# Patient Record
Sex: FEMALE | Race: WHITE | NOT HISPANIC OR LATINO | Employment: OTHER | URBAN - METROPOLITAN AREA
[De-identification: names, ages, dates, MRNs, and addresses within clinical notes are randomized per-mention and may not be internally consistent; named-entity substitution may affect disease eponyms.]

---

## 2018-12-06 ENCOUNTER — TRANSFERRED RECORDS (OUTPATIENT)
Dept: HEALTH INFORMATION MANAGEMENT | Facility: CLINIC | Age: 58
End: 2018-12-06

## 2019-01-01 ENCOUNTER — TRANSFERRED RECORDS (OUTPATIENT)
Dept: MULTI SPECIALTY CLINIC | Facility: CLINIC | Age: 59
End: 2019-01-01

## 2020-01-01 ENCOUNTER — TRANSFERRED RECORDS (OUTPATIENT)
Dept: MULTI SPECIALTY CLINIC | Facility: CLINIC | Age: 60
End: 2020-01-01

## 2020-01-01 LAB — PAP SMEAR - HIM PATIENT REPORTED: NEGATIVE

## 2020-01-16 ENCOUNTER — TRANSFERRED RECORDS (OUTPATIENT)
Dept: HEALTH INFORMATION MANAGEMENT | Facility: CLINIC | Age: 60
End: 2020-01-16

## 2020-01-21 ENCOUNTER — RECORDS - HEALTHEAST (OUTPATIENT)
Dept: ADMINISTRATIVE | Facility: OTHER | Age: 60
End: 2020-01-21

## 2020-02-08 ENCOUNTER — HEALTH MAINTENANCE LETTER (OUTPATIENT)
Age: 60
End: 2020-02-08

## 2020-11-07 ENCOUNTER — HEALTH MAINTENANCE LETTER (OUTPATIENT)
Age: 60
End: 2020-11-07

## 2021-03-21 ENCOUNTER — HEALTH MAINTENANCE LETTER (OUTPATIENT)
Age: 61
End: 2021-03-21

## 2021-03-29 ENCOUNTER — OFFICE VISIT (OUTPATIENT)
Dept: PEDIATRICS | Facility: CLINIC | Age: 61
End: 2021-03-29
Payer: COMMERCIAL

## 2021-03-29 VITALS
TEMPERATURE: 97.9 F | HEART RATE: 83 BPM | SYSTOLIC BLOOD PRESSURE: 110 MMHG | WEIGHT: 140.1 LBS | HEIGHT: 66 IN | BODY MASS INDEX: 22.52 KG/M2 | DIASTOLIC BLOOD PRESSURE: 70 MMHG | OXYGEN SATURATION: 97 %

## 2021-03-29 DIAGNOSIS — N93.9 ABNORMAL UTERINE BLEEDING DUE TO ENDOMETRIAL POLYP: ICD-10-CM

## 2021-03-29 DIAGNOSIS — Z01.818 PREOP GENERAL PHYSICAL EXAM: Primary | ICD-10-CM

## 2021-03-29 DIAGNOSIS — N84.0 ABNORMAL UTERINE BLEEDING DUE TO ENDOMETRIAL POLYP: ICD-10-CM

## 2021-03-29 LAB
ALBUMIN SERPL-MCNC: 3.8 G/DL (ref 3.4–5)
ALP SERPL-CCNC: 53 U/L (ref 40–150)
ALT SERPL W P-5'-P-CCNC: 24 U/L (ref 0–50)
ANION GAP SERPL CALCULATED.3IONS-SCNC: 7 MMOL/L (ref 3–14)
AST SERPL W P-5'-P-CCNC: 16 U/L (ref 0–45)
BILIRUB SERPL-MCNC: 0.6 MG/DL (ref 0.2–1.3)
BUN SERPL-MCNC: 15 MG/DL (ref 7–30)
CALCIUM SERPL-MCNC: 9.2 MG/DL (ref 8.5–10.1)
CHLORIDE SERPL-SCNC: 105 MMOL/L (ref 94–109)
CO2 SERPL-SCNC: 27 MMOL/L (ref 20–32)
CREAT SERPL-MCNC: 0.76 MG/DL (ref 0.52–1.04)
ERYTHROCYTE [DISTWIDTH] IN BLOOD BY AUTOMATED COUNT: 12.2 % (ref 10–15)
GFR SERPL CREATININE-BSD FRML MDRD: 84 ML/MIN/{1.73_M2}
GLUCOSE SERPL-MCNC: 76 MG/DL (ref 70–99)
HCT VFR BLD AUTO: 41.9 % (ref 35–47)
HGB BLD-MCNC: 14.1 G/DL (ref 11.7–15.7)
MCH RBC QN AUTO: 32.7 PG (ref 26.5–33)
MCHC RBC AUTO-ENTMCNC: 33.7 G/DL (ref 31.5–36.5)
MCV RBC AUTO: 97 FL (ref 78–100)
PLATELET # BLD AUTO: 266 10E9/L (ref 150–450)
POTASSIUM SERPL-SCNC: 4 MMOL/L (ref 3.4–5.3)
PROT SERPL-MCNC: 7.4 G/DL (ref 6.8–8.8)
RBC # BLD AUTO: 4.31 10E12/L (ref 3.8–5.2)
SODIUM SERPL-SCNC: 139 MMOL/L (ref 133–144)
WBC # BLD AUTO: 5.3 10E9/L (ref 4–11)

## 2021-03-29 PROCEDURE — 85027 COMPLETE CBC AUTOMATED: CPT | Performed by: NURSE PRACTITIONER

## 2021-03-29 PROCEDURE — 99203 OFFICE O/P NEW LOW 30 MIN: CPT | Performed by: NURSE PRACTITIONER

## 2021-03-29 PROCEDURE — 80053 COMPREHEN METABOLIC PANEL: CPT | Performed by: NURSE PRACTITIONER

## 2021-03-29 PROCEDURE — 36415 COLL VENOUS BLD VENIPUNCTURE: CPT | Performed by: NURSE PRACTITIONER

## 2021-03-29 SDOH — HEALTH STABILITY: MENTAL HEALTH: HOW OFTEN DO YOU HAVE 6 OR MORE DRINKS ON ONE OCCASION?: NOT ASKED

## 2021-03-29 SDOH — HEALTH STABILITY: MENTAL HEALTH: HOW MANY STANDARD DRINKS CONTAINING ALCOHOL DO YOU HAVE ON A TYPICAL DAY?: NOT ASKED

## 2021-03-29 SDOH — HEALTH STABILITY: MENTAL HEALTH: HOW OFTEN DO YOU HAVE A DRINK CONTAINING ALCOHOL?: NOT ASKED

## 2021-03-29 ASSESSMENT — MIFFLIN-ST. JEOR: SCORE: 1212.24

## 2021-03-29 NOTE — PATIENT INSTRUCTIONS

## 2021-03-29 NOTE — PROGRESS NOTES
[facility]  [address]  [address]  [place] [address]  Phone: [phone]  Fax: [phone]  Primary Provider: Physicians, [facility]        PREOPERATIVE EVALUATION:  Today's date: 3/29/2021    Julia Key is a 60 year old female who presents for a preoperative evaluation.    Surgical Information:  Surgery/Procedure: Hysteroscopy, polypectomy and d&c  Surgery Location: [facility] [place], [place]  Surgeon: Dr. Sauer  Surgery Date: April 7, 2021  Time of Surgery: 2:00 pm  Where patient plans to recover: At home with family  Fax number for surgical facility: Will locate    Type of Anesthesia Anticipated: Local, possibly other    Assessment & Plan     The proposed surgical procedure is considered LOW risk.    Preop general physical exam  Abnormal uterine bleeding due to endometrial polyp  - CBC with platelets  - Comprehensive metabolic panel (BMP + Alb, Alk Phos, ALT, AST, Total. Bili, TP)       Risks and Recommendations:  The patient has the following additional risks and recommendations for perioperative complications:    Cardiovascular:   - History of hyperlipidemia discovered on lipid panel 8/16/2016. Family history significant for hypertension and heart disease in father, CAD in brother, and MI in paternal grandmother. Not on statin therapy. No calcium score on file.     Medication Instructions:  Patient plans to hold her daily medications the morning of procedure.     RECOMMENDATION:  APPROVAL GIVEN to proceed with proposed procedure, without further diagnostic evaluation.      34 minutes spent on the date of the encounter doing chart review, review of test results, interpretation of tests, patient visit and documentation           Subjective     HPI related to upcoming procedure: Hysteroscopy, Polypectomy and D&C. The following was obtained from patient report: Julia reports she had been on bioidentical hormone replacement for a little over a year, at routine  check up it was recommended this be discontinued. Last injection May 2020. Had some spotting thereafter, worse with increased physical actvity. In February 2021, had increased and prolonged uterine bleeding with clots. Transvaginal US and biopsy completed, biopsy results benign. Found polyp, thought to be causative for bleeding, therefore was recommended to have removed.     Preop Questions 3/26/2021   1. Have you ever had a heart attack or stroke? No   2. Have you ever had surgery on your heart or blood vessels, such as a stent placement, a coronary artery bypass, or surgery on an artery in your head, neck, heart, or legs? No   3. Do you have chest pain with activity? No   4. Do you have a history of  heart failure? No   5. Do you currently have a cold, bronchitis or symptoms of other infection? No   6. Do you have a cough, shortness of breath, or wheezing? No   7. Do you or anyone in your family have previous history of blood clots? No   8. Do you or does anyone in your family have a serious bleeding problem such as prolonged bleeding following surgeries or cuts? No   9. Have you ever had problems with anemia or been told to take iron pills? YES - Remote hx of anemia, not currently taking Iron supp.   10. Have you had any abnormal blood loss such as black, tarry or bloody stools, or abnormal vaginal bleeding? YES - Related to surgery need   11. Have you ever had a blood transfusion? No   12. Are you willing to have a blood transfusion if it is medically needed before, during, or after your surgery? Yes   13. Have you or any of your relatives ever had problems with anesthesia? No   14. Do you have sleep apnea, excessive snoring or daytime drowsiness? No   15. Do you have any artifical heart valves or other implanted medical devices like a pacemaker, defibrillator, or continuous glucose monitor? No   16. Do you have artificial joints? No   17. Are you allergic to latex? YES: Current allergy, hives and oral swelling.        Health Care Directive:  Patient does not have a Health Care Directive or Living Will: Discussed advance care planning with patient; however, patient declined at this time.    Preoperative Review of :   reviewed - no record of controlled substances prescribed.      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     Hyperlipidemia     One high reading late Fall 2016     Osteopenia      Past Surgical History:   Procedure Laterality Date     C LT SHOULDER G/E 2 VIEWS  01/01/1999 1997, 1999 - decompression, clavicle resection       Family History   Problem Relation Age of Onset     Osteoporosis Mother      Dementia Mother      Hypertension Father      Heart Disease Father      Cerebrovascular Disease Father      Coronary Artery Disease Father 56     Coronary Artery Disease Brother      No Known Problems Maternal Grandmother      No Known Problems Maternal Grandfather      Myocardial Infarction Paternal Grandmother      Obesity Paternal Grandmother      Social History     Socioeconomic History     Marital status:      Spouse name: Not on file     Number of children: Not on file     Years of education: Not on file     Highest education level: Not on file   Occupational History     Not on file   Social Needs     Financial resource strain: Not on file     Food insecurity     Worry: Not on file     Inability: Not on file     Transportation needs     Medical: Not on file     Non-medical: Not on file   Tobacco Use     Smoking status: Never Smoker     Smokeless tobacco: Never Used   Substance and Sexual Activity     Alcohol use: Yes     Comment: 2-3 drinks/month, socially     Drug use: No     Sexual activity: Yes     Partners: Male     Birth control/protection: None   Lifestyle     Physical activity     Days per week: Not on file     Minutes per session: Not on file     Stress: Not on file   Relationships     Social connections     Talks on phone:  "Not on file     Gets together: Not on file     Attends Yazidism service: Not on file     Active member of club or organization: Not on file     Attends meetings of clubs or organizations: Not on file     Relationship status: Not on file     Intimate partner violence     Fear of current or ex partner: Not on file     Emotionally abused: Not on file     Physically abused: Not on file     Forced sexual activity: Not on file   Other Topics Concern     Parent/sibling w/ CABG, MI or angioplasty before 65F 55M? 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   since 1989. One daughter, one dog.     Current Outpatient Medications   Medication     CALCIUM-VITAMIN D PO     cetirizine (ZYRTEC) 10 MG tablet     No current facility-administered medications for this visit.         Allergies   Allergen Reactions     Iodine Hives     Latex          Review of Systems  CONSTITUTIONAL: NEGATIVE for fever, chills, change in weight  INTEGUMENTARY/SKIN: NEGATIVE for worrisome rashes, moles or lesions  EYES: NEGATIVE for vision changes or irritation  ENT/MOUTH: NEGATIVE for ear, mouth and throat problems  RESP: NEGATIVE for significant cough or SOB  BREAST: NEGATIVE for masses, tenderness or discharge  CV: NEGATIVE for chest pain, palpitations or peripheral edema  GI: NEGATIVE for nausea, abdominal pain, heartburn. Some mild in bowel habits, looser stools.  : NEGATIVE for frequency, dysuria, or hematuria  MUSCULOSKELETAL: NEGATIVE for significant arthralgias or myalgia  NEURO: NEGATIVE for weakness, dizziness or paresthesias  ENDOCRINE: NEGATIVE for temperature intolerance, skin/hair changes  HEME: NEGATIVE for bleeding problems  PSYCHIATRIC: NEGATIVE for changes in mood or affect          Objective     /70 (BP Location: Right arm, Patient Position: Sitting, Cuff Size: Adult Regular)   Pulse 83   Temp 97.9  F (36.6  C) (Tympanic)   Ht 1.676 m (5' 6\")   Wt 63.5 kg " (140 lb 1.6 oz)   SpO2 97%   BMI 22.61 kg/m      Physical Exam  Constitutional: appears to be in no acute distress, comfortable, pleasant.   Eyes: anicteric, conjunctiva clear without drainage or erythema. MARQUEZ.   Ears, Nose and Throat: tympanic membranes gray with LR,  nose without nasal discharge. OP: no erythema to posterior pharynx, negative post nasal drainage, tonsils +1 no erythema or exudate.  Neck: supple, thyroid palpable,not enlarged, no nodules   Cardiovascular: regular rate and rhythm, normal S1 and S2, no murmurs, rubs or gallops, peripheral pulses full and symmetric; negative peripheral edema   Respiratory: Air entry throughout. Breathing pattern unlabored without the use of accessory muscles. Clear to auscultation A and P, no wheezes or crackles, normal breath sounds.    Gastrointestinal: rounded, soft. Positive bowel sounds x4, nontender, no masses.   Musculoskeletal: full range of motion, no edema.   Skin: pink, turgor smooth and elastic. Negative for lesions or dryness.  Neurological: normal gait, no tremor.   Psychological: appropriate mood and affect.   Lymphatic: no cervical, axillary, supraclavicular, or infraclavicular lymphadenopathy.    No results for input(s): HGB, PLT, INR, NA, POTASSIUM, CR, A1C in the last 42685 hours.     Diagnostics:  Recent Results (from the past 24 hour(s))   CBC with platelets    Collection Time: 03/29/21  9:30 AM   Result Value Ref Range    WBC 5.3 4.0 - 11.0 10e9/L    RBC Count 4.31 3.8 - 5.2 10e12/L    Hemoglobin 14.1 11.7 - 15.7 g/dL    Hematocrit 41.9 35.0 - 47.0 %    MCV 97 78 - 100 fl    MCH 32.7 26.5 - 33.0 pg    MCHC 33.7 31.5 - 36.5 g/dL    RDW 12.2 10.0 - 15.0 %    Platelet Count 266 150 - 450 10e9/L   Comprehensive metabolic panel (BMP + Alb, Alk Phos, ALT, AST, Total. Bili, TP)    Collection Time: 03/29/21  9:30 AM   Result Value Ref Range    Sodium 139 133 - 144 mmol/L    Potassium 4.0 3.4 - 5.3 mmol/L    Chloride 105 94 - 109 mmol/L    Carbon  Dioxide 27 20 - 32 mmol/L    Anion Gap 7 3 - 14 mmol/L    Glucose 76 70 - 99 mg/dL    Urea Nitrogen 15 7 - 30 mg/dL    Creatinine 0.76 0.52 - 1.04 mg/dL    GFR Estimate 84 >60 mL/min/[1.73_m2]    GFR Estimate If Black >90 >60 mL/min/[1.73_m2]    Calcium 9.2 8.5 - 10.1 mg/dL    Bilirubin Total 0.6 0.2 - 1.3 mg/dL    Albumin 3.8 3.4 - 5.0 g/dL    Protein Total 7.4 6.8 - 8.8 g/dL    Alkaline Phosphatase 53 40 - 150 U/L    ALT 24 0 - 50 U/L    AST 16 0 - 45 U/L      No EKG required, no history of coronary heart disease, significant arrhythmia, peripheral arterial disease or other structural heart disease.    Revised Cardiac Risk Index (RCRI):  The patient has the following serious cardiovascular risks for perioperative complications:   - No serious cardiac risks = 0 points     RCRI Interpretation: 0 points: Class I (very low risk - 0.4% complication rate)         Signed Electronically by: DARLENE Acevedo CNP  Copy of this evaluation report is provided to requesting physician.

## 2021-09-05 ENCOUNTER — HEALTH MAINTENANCE LETTER (OUTPATIENT)
Age: 61
End: 2021-09-05

## 2022-02-20 ENCOUNTER — HEALTH MAINTENANCE LETTER (OUTPATIENT)
Age: 62
End: 2022-02-20

## 2022-04-17 ENCOUNTER — HEALTH MAINTENANCE LETTER (OUTPATIENT)
Age: 62
End: 2022-04-17

## 2022-07-18 ENCOUNTER — TRANSFERRED RECORDS (OUTPATIENT)
Dept: MULTI SPECIALTY CLINIC | Facility: CLINIC | Age: 62
End: 2022-07-18

## 2022-07-20 SDOH — ECONOMIC STABILITY: TRANSPORTATION INSECURITY
IN THE PAST 12 MONTHS, HAS THE LACK OF TRANSPORTATION KEPT YOU FROM MEDICAL APPOINTMENTS OR FROM GETTING MEDICATIONS?: NO

## 2022-07-20 SDOH — ECONOMIC STABILITY: FOOD INSECURITY: WITHIN THE PAST 12 MONTHS, THE FOOD YOU BOUGHT JUST DIDN'T LAST AND YOU DIDN'T HAVE MONEY TO GET MORE.: NEVER TRUE

## 2022-07-20 SDOH — ECONOMIC STABILITY: TRANSPORTATION INSECURITY
IN THE PAST 12 MONTHS, HAS LACK OF TRANSPORTATION KEPT YOU FROM MEETINGS, WORK, OR FROM GETTING THINGS NEEDED FOR DAILY LIVING?: NO

## 2022-07-20 SDOH — HEALTH STABILITY: PHYSICAL HEALTH: ON AVERAGE, HOW MANY DAYS PER WEEK DO YOU ENGAGE IN MODERATE TO STRENUOUS EXERCISE (LIKE A BRISK WALK)?: 3 DAYS

## 2022-07-20 SDOH — HEALTH STABILITY: PHYSICAL HEALTH: ON AVERAGE, HOW MANY MINUTES DO YOU ENGAGE IN EXERCISE AT THIS LEVEL?: 30 MIN

## 2022-07-20 SDOH — ECONOMIC STABILITY: INCOME INSECURITY: IN THE LAST 12 MONTHS, WAS THERE A TIME WHEN YOU WERE NOT ABLE TO PAY THE MORTGAGE OR RENT ON TIME?: NO

## 2022-07-20 SDOH — ECONOMIC STABILITY: FOOD INSECURITY: WITHIN THE PAST 12 MONTHS, YOU WORRIED THAT YOUR FOOD WOULD RUN OUT BEFORE YOU GOT MONEY TO BUY MORE.: NEVER TRUE

## 2022-07-20 SDOH — ECONOMIC STABILITY: INCOME INSECURITY: HOW HARD IS IT FOR YOU TO PAY FOR THE VERY BASICS LIKE FOOD, HOUSING, MEDICAL CARE, AND HEATING?: NOT HARD AT ALL

## 2022-07-20 ASSESSMENT — ENCOUNTER SYMPTOMS
DIARRHEA: 0
JOINT SWELLING: 0
FREQUENCY: 0
SHORTNESS OF BREATH: 1
PARESTHESIAS: 0
BREAST MASS: 0
EYE PAIN: 0
DIZZINESS: 0
DYSURIA: 0
CONSTIPATION: 0
NAUSEA: 1
NERVOUS/ANXIOUS: 0
WEAKNESS: 0
ABDOMINAL PAIN: 0
HEMATURIA: 0
HEARTBURN: 0
PALPITATIONS: 0
MYALGIAS: 0
HEADACHES: 0
FEVER: 0
ARTHRALGIAS: 1
SORE THROAT: 0
COUGH: 0
HEMATOCHEZIA: 0
CHILLS: 0

## 2022-07-20 ASSESSMENT — SOCIAL DETERMINANTS OF HEALTH (SDOH)
DO YOU BELONG TO ANY CLUBS OR ORGANIZATIONS SUCH AS CHURCH GROUPS UNIONS, FRATERNAL OR ATHLETIC GROUPS, OR SCHOOL GROUPS?: NO
HOW OFTEN DO YOU GET TOGETHER WITH FRIENDS OR RELATIVES?: ONCE A WEEK
HOW OFTEN DO YOU ATTEND CHURCH OR RELIGIOUS SERVICES?: 1 TO 4 TIMES PER YEAR
IN A TYPICAL WEEK, HOW MANY TIMES DO YOU TALK ON THE PHONE WITH FAMILY, FRIENDS, OR NEIGHBORS?: MORE THAN THREE TIMES A WEEK

## 2022-07-20 ASSESSMENT — LIFESTYLE VARIABLES
HOW OFTEN DO YOU HAVE A DRINK CONTAINING ALCOHOL: MONTHLY OR LESS
HOW OFTEN DO YOU HAVE SIX OR MORE DRINKS ON ONE OCCASION: NEVER
AUDIT-C TOTAL SCORE: 1
HOW MANY STANDARD DRINKS CONTAINING ALCOHOL DO YOU HAVE ON A TYPICAL DAY: 1 OR 2
SKIP TO QUESTIONS 9-10: 1

## 2022-07-27 ENCOUNTER — OFFICE VISIT (OUTPATIENT)
Dept: PEDIATRICS | Facility: CLINIC | Age: 62
End: 2022-07-27
Payer: COMMERCIAL

## 2022-07-27 VITALS
SYSTOLIC BLOOD PRESSURE: 122 MMHG | HEIGHT: 65 IN | HEART RATE: 85 BPM | WEIGHT: 141 LBS | BODY MASS INDEX: 23.49 KG/M2 | TEMPERATURE: 97.5 F | OXYGEN SATURATION: 99 % | RESPIRATION RATE: 16 BRPM | DIASTOLIC BLOOD PRESSURE: 80 MMHG
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
## 2022-07-27 DIAGNOSIS — M85.80 OSTEOPENIA, UNSPECIFIED LOCATION: ICD-10-CM

## 2022-07-27 DIAGNOSIS — Z13.220 SCREENING FOR HYPERLIPIDEMIA: ICD-10-CM

## 2022-07-27 DIAGNOSIS — R07.89 CHEST TIGHTNESS: ICD-10-CM

## 2022-07-27 DIAGNOSIS — Z00.00 ROUTINE GENERAL MEDICAL EXAMINATION AT A HEALTH CARE FACILITY: Primary | ICD-10-CM

## 2022-07-27 DIAGNOSIS — N95.1 SYMPTOMATIC MENOPAUSAL OR FEMALE CLIMACTERIC STATES: ICD-10-CM

## 2022-07-27 PROCEDURE — 99213 OFFICE O/P EST LOW 20 MIN: CPT | Mod: 25 | Performed by: NURSE PRACTITIONER

## 2022-07-27 PROCEDURE — 99396 PREV VISIT EST AGE 40-64: CPT | Performed by: NURSE PRACTITIONER

## 2022-07-27 PROCEDURE — 82947 ASSAY GLUCOSE BLOOD QUANT: CPT | Performed by: NURSE PRACTITIONER

## 2022-07-27 PROCEDURE — 80061 LIPID PANEL: CPT | Performed by: NURSE PRACTITIONER

## 2022-07-27 PROCEDURE — 36415 COLL VENOUS BLD VENIPUNCTURE: CPT | Performed by: NURSE PRACTITIONER

## 2022-07-27 RX ORDER — TRIAMCINOLONE ACETONIDE 1 MG/G
CREAM TOPICAL 2 TIMES DAILY
Qty: 45 G | Refills: 0 | Status: SHIPPED | OUTPATIENT
Start: 2022-07-27 | End: 2024-07-29

## 2022-07-27 ASSESSMENT — ENCOUNTER SYMPTOMS
HEADACHES: 0
HEARTBURN: 0
SHORTNESS OF BREATH: 1
EYE PAIN: 0
CHILLS: 0
FEVER: 0
DIARRHEA: 0
CONSTIPATION: 0
BREAST MASS: 0
WEAKNESS: 0
PARESTHESIAS: 0
ARTHRALGIAS: 1
HEMATURIA: 0
DIZZINESS: 0
NERVOUS/ANXIOUS: 0
HEMATOCHEZIA: 0
ABDOMINAL PAIN: 0
DYSURIA: 0
FREQUENCY: 0
NAUSEA: 1
COUGH: 0
MYALGIAS: 0
JOINT SWELLING: 0
SORE THROAT: 0
PALPITATIONS: 0

## 2022-07-27 ASSESSMENT — PAIN SCALES - GENERAL: PAINLEVEL: NO PAIN (0)

## 2022-07-27 NOTE — PATIENT INSTRUCTIONS
[facility]  [phone]    Preventive Health Recommendations  Female Ages 50 - 64    Yearly exam: See your health care provider every year in order to  Review health changes.   Discuss preventive care.    Review your medicines if your doctor has prescribed any.    Get a Pap test every three years (unless you have an abnormal result and your provider advises testing more often).  If you get Pap tests with HPV test, you only need to test every 5 years, unless you have an abnormal result.   You do not need a Pap test if your uterus was removed (hysterectomy) and you have not had cancer.  You should be tested each year for STDs (sexually transmitted diseases) if you're at risk.   Have a mammogram every 1 to 2 years.  Have a colonoscopy at age 50, or have a yearly FIT test (stool test). These exams screen for colon cancer.    Have a cholesterol test every 5 years, or more often if advised.  Have a diabetes test (fasting glucose) every three years. If you are at risk for diabetes, you should have this test more often.   If you are at risk for osteoporosis (brittle bone disease), think about having a bone density scan (DEXA).    Shots: Get a flu shot each year. Get a tetanus shot every 10 years.    Nutrition:   Eat at least 5 servings of fruits and vegetables each day.  Eat whole-grain bread, whole-wheat pasta and brown rice instead of white grains and rice.  Get adequate Calcium and Vitamin D.     Lifestyle  Exercise at least 150 minutes a week (30 minutes a day, 5 days a week). This will help you control your weight and prevent disease.  Limit alcohol to one drink per day.  No smoking.   Wear sunscreen to prevent skin cancer.   See your dentist every six months for an exam and cleaning.  See your eye doctor every 1 to 2 years.

## 2022-07-27 NOTE — PROGRESS NOTES
"   SUBJECTIVE:   CC: Julia Key is an 61 year old woman who presents for preventive health visit.     Patient has been advised of split billing requirements and indicates understanding: Yes     Healthy Habits:     Getting at least 3 servings of Calcium per day:  Yes    Bi-annual eye exam:  Yes    Dental care twice a year:  Yes    Sleep apnea or symptoms of sleep apnea:  None    Diet:  Gluten-free/reduced    Frequency of exercise:  4-5 days/week    Duration of exercise:  45-60 minutes    Taking medications regularly:  No    Barriers to taking medications:  Other    Medication side effects:  Other    PHQ-2 Total Score: 0    Additional concerns today:  Yes    Last pap January 2020 at [facility], results reportedly normal. Denies history of abnormal paps.   Mammo - completed last week through [facility], reportedly normal.   DEXA - due  Colonoscopy completed 2019 at [facility] - normal, repeat in 10 years    Has noted that when she climbs stairs, specifically two flights in close succession, will feel substernal chest tightness and possibly some mild SOB. Resolves with rest after a couple of minutes. Can walk at brisk pace and do some aerobics without symptoms.     Hot flashes are \"unbelievable.\" Occur first thing in the morning, intermittent from 8 am until midnight. Was on HRT for period of time as well as bio-identical hormone, which was very helpful. Hot flashes worse since removal of endometrial polyp. Also has vaginal dryness.     Today's PHQ-2 Score:   PHQ-2 ( 1999 Pf[employer]) 7/20/2022   Q1: Little interest or pleasure in doing things 0   Q2: Feeling down, depressed or hopeless 0   PHQ-2 Score 0   Q1: Little interest or pleasure in doing things Not at all   Q2: Feeling down, depressed or hopeless Not at all   PHQ-2 Score 0       Abuse: Current or Past (Physical, Sexual or Emotional) - No  Do you feel safe in your environment? Yes    Have you ever done Advance Care Planning? (For example, a " Health Directive, POLST, or a discussion with a medical provider or your loved ones about your wishes): No, advance care planning information given to patient to review.  Patient plans to discuss their wishes with loved ones or provider.      Social History     Tobacco Use     Smoking status: Never Smoker     Smokeless tobacco: Never Used   Substance Use Topics     Alcohol use: Yes     Comment: 2-3 drinks/month, socially         Alcohol Use 7/20/2022   Prescreen: >3 drinks/day or >7 drinks/week? No       Reviewed orders with patient.  Reviewed health maintenance and updated orders accordingly - Yes  BP Readings from Last 3 Encounters:   07/27/22 122/80   03/29/21 110/70   12/16/16 145/90    Wt Readings from Last 3 Encounters:   07/27/22 64 kg (141 lb)   03/29/21 63.5 kg (140 lb 1.6 oz)   12/16/16 68.5 kg (151 lb 1.6 oz)             Breast Cancer Screening:    Breast CA Risk Assessment (FHS-7) 7/20/2022   Do you have a family history of breast, colon, or ovarian cancer? No / Unknown       Mammogram Screening: Recommended mammography every 1-2 years with patient discussion and risk factor consideration  Pertinent mammograms are reviewed under the imaging tab.    History of abnormal Pap smear: NO - age 30-65 PAP every 5 years with negative HPV co-testing recommended  PAP / HPV 7/29/2016 7/27/2015   PAP (Historical) NIL NIL     Reviewed and updated as needed this visit by clinical staff   Tobacco  Allergies                 Reviewed and updated as needed this visit by Provider                   Patient Active Problem List   Diagnosis     Osteopenia     Hyperlipidemia LDL goal <130     Depression with anxiety     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     Hyperlipidemia     One high reading late Fall 2016     Osteopenia      Past Surgical History:   Procedure Laterality Date     ZZC LT SHOULDER G/E 2 VIEWS  01/01/1999    1997, 1999 - decompression,  clavicle resection     Family History   Problem Relation Age of Onset     Osteoporosis Mother      Dementia Mother      Hypertension Father      Heart Disease Father      Cerebrovascular Disease Father      Coronary Artery Disease Father 56     Coronary Artery Disease Brother      No Known Problems Maternal Grandmother      No Known Problems Maternal Grandfather      Myocardial Infarction Paternal Grandmother      Obesity Paternal Grandmother      Social History     Socioeconomic History     Marital status:      Spouse name: Not on file     Number of children: Not on file     Years of education: Not on file     Highest education level: Not on file   Occupational History     Not on file   Tobacco Use     Smoking status: Never Smoker     Smokeless tobacco: Never Used   Vaping Use     Vaping Use: Never used   Substance and Sexual Activity     Alcohol use: Yes     Comment: 2-3 drinks/month, socially     Drug use: No     Sexual activity: Yes     Partners: Male     Birth control/protection: None   Other Topics Concern     Parent/sibling w/ CABG, MI or angioplasty before 65F 55M? 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   since 1989. One daughter, one dog.     Social Determinants of Health     Financial Resource Strain: Low Risk      Difficulty of Paying Living Expenses: Not hard at all   Food Insecurity: No Food Insecurity     Worried About Running Out of Food in the Last Year: Never true     Ran Out of Food in the Last Year: Never true   Transportation Needs: No Transportation Needs     Lack of Transportation (Medical): No     Lack of Transportation (Non-Medical): No   Physical Activity: Insufficiently Active     Days of Exercise per Week: 3 days     Minutes of Exercise per Session: 30 min   Stress: No Stress Concern Present     Feeling of Stress : Not at all   Social Connections: Moderately Integrated     Frequency of Communication with Friends  "and Family: More than three times a week     Frequency of Social Gatherings with Friends and Family: Once a week     Attends Baptism Services: 1 to 4 times per year     Active Member of Clubs or Organizations: No     Attends Club or Organization Meetings: Not on file     Marital Status:    Intimate Partner Violence: Not on file   Housing Stability: Low Risk      Unable to Pay for Housing in the Last Year: No     Number of Places Lived in the Last Year: 1     Unstable Housing in the Last Year: No     Current Outpatient Medications   Medication     triamcinolone (KENALOG) 0.1 % external cream     CALCIUM-VITAMIN D PO     cetirizine (ZYRTEC) 10 MG tablet     No current facility-administered medications for this visit.        Allergies   Allergen Reactions     Iodine Hives     Latex          Review of Systems   Constitutional: Negative for chills and fever.   HENT: Negative for ear pain, hearing loss and sore throat.    Eyes: Negative for pain and visual disturbance.   Respiratory: Positive for shortness of breath. Negative for cough.    Cardiovascular: Negative for chest pain, palpitations and peripheral edema.   Gastrointestinal: Positive for nausea. Negative for abdominal pain, constipation, diarrhea, heartburn and hematochezia.   Breasts:  Negative for tenderness, breast mass and discharge.   Genitourinary: Negative for dysuria, frequency, genital sores, hematuria, pelvic pain, urgency, vaginal bleeding and vaginal discharge.   Musculoskeletal: Positive for arthralgias. Negative for joint swelling and myalgias.   Skin: Negative for rash.   Neurological: Negative for dizziness, weakness, headaches and paresthesias.   Psychiatric/Behavioral: Negative for mood changes. The patient is not nervous/anxious.      OBJECTIVE:   /80   Pulse 85   Temp 97.5  F (36.4  C) (Tympanic)   Resp 16   Ht 1.638 m (5' 4.5\")   Wt 64 kg (141 lb)   SpO2 99%   BMI 23.83 kg/m    Physical Exam  Constitutional: appears to " be in no acute distress, comfortable, pleasant.   Eyes: anicteric, conjunctiva clear without drainage or erythema. MARQUEZ.   Ears, Nose and Throat: tympanic membranes gray with LR,  nose without nasal discharge. OP: no erythema to posterior pharynx, negative post nasal drainage, tonsils +1 no erythema or exudate.  Neck: supple, thyroid palpable,not enlarged, no nodules   Breast: Exam deferred (deferred after discussion of exam options with patient, no symptoms or concerns).   Cardiovascular: regular rate and rhythm, normal S1 and S2, no murmurs, rubs or gallops, peripheral pulses full and symmetric; negative peripheral edema   Respiratory: Air entry throughout. Breathing pattern unlabored without the use of accessory muscles. Clear to auscultation A and P, no wheezes or crackles, normal breath sounds.    Gastrointestinal: rounded, soft. Positive bowel sounds x4, nontender, no masses.   Genitourinary: Exam deferred (deferred after discussion of exam options with patient, no symptoms or concerns, pap up to date).   Musculoskeletal: full range of motion, no edema.   Skin: pink, turgor smooth and elastic. Negative for lesions or dryness.  Neurological: normal gait, no tremor.   Psychological: appropriate mood and affect.   Lymphatic: no cervical, axillary, supraclavicular, or infraclavicular lymphadenopathy.    Diagnostic Test Results:  Labs reviewed in Epic    ASSESSMENT/PLAN:   (Z00.00) Routine general medical examination at a health care facility  (primary encounter diagnosis)  Age appropriate screening and preventative care have been addressed today. Vaccinations have been reviewed and are up to date. Patient has been advised to follow a balanced diet with adequate calcium and vitamin D. They have been advised to undertake routine aerobic activity and they were counseled on healthy weight maintenance. Colonoscopy has been reviewed and is up to date at this time. Recommend annual vision exams as well as biannual dental  exams. They will follow up for annual physical again in one year.   - Lipid panel reflex to direct LDL Non-fasting  - Glucose  - Last pap January 2020 at [facility], results reportedly normal. Denies history of abnormal paps.   - Mammo - completed last week through [facility], reportedly normal.   - DEXA - due  - [place] [facility] [place] repeat in 10 years         (Z13.220) Screening for hyperlipidemia  - Lipid panel reflex to direct LDL Non-fasting         (M85.80) Osteopenia, unspecified location  Pt with history of osteopenia, was recommended to take fosomax in the past but couldn't tolerate the medication administration. Last DEXA in 2015 (and also in 2013). Interested in repeating this. Mother with history of compression fractures.   - DX Hip/Pelvis/Spine          (W57.XXXS) Bug bite, sequela  Seems to be experiencing worsening reactions to bug bites, particularly on her feet. Recommend wearing shoes and socks when outside. Will provide rx for trimcinolone cream prn.   - triamcinolone (KENALOG) 0.1 % external cream          (R07.89) Chest tightness  Patient does have a significant family history of heart disease in her father and for this reason, I think it would be worthwhile to perform an exercise stress test to rule out ischemia.   - Exercise Stress Test - Adult         (N95.1) Symptomatic menopausal or female climacteric states  Discussed possibility of trialing venlafaxine for management of hot flashes. She will think about this and follow-up in the event she would like to pursue.       Follow-up: Lab results pending, will follow-up as indicated after reviewing results.       COUNSELING:  Reviewed preventive health counseling, as reflected in patient instructions  Special attention given to:        Regular exercise       Healthy diet/nutrition       Vision screening       Immunizations       Osteoporosis prevention/bone health       Colorectal Cancer  "Screening       (Cyndie)menopause management    Estimated body mass index is 23.83 kg/m  as calculated from the following:    Height as of this encounter: 1.638 m (5' 4.5\").    Weight as of this encounter: 64 kg (141 lb).      She reports that she has never smoked. She has never used smokeless tobacco.      Counseling Resources:  ATP IV Guidelines  Pooled Cohorts Equation Calculator  Breast Cancer Risk Calculator  BRCA-Related Cancer Risk Assessment: FHS-7 Tool  FRAX Risk Assessment  ICSI Preventive Guidelines  Dietary Guidelines for Americans, 2010  USDA's MyPlate  ASA Prophylaxis  Lung CA Screening    DARLENE Acevedo CNP  [facility]  "

## 2022-07-27 NOTE — Clinical Note
Last pap January 2020 at [facility], results reportedly normal. Denies history of abnormal paps.  Mammo - completed last week through [facility], reportedly normal.

## 2022-07-28 ENCOUNTER — ANCILLARY PROCEDURE (OUTPATIENT)
Dept: BONE DENSITY | Facility: CLINIC | Age: 62
End: 2022-07-28
Payer: COMMERCIAL

## 2022-07-28 DIAGNOSIS — M85.80 OSTEOPENIA, UNSPECIFIED LOCATION: ICD-10-CM

## 2022-07-28 LAB
CHOLEST SERPL-MCNC: 260 MG/DL
FASTING STATUS PATIENT QL REPORTED: YES
FASTING STATUS PATIENT QL REPORTED: YES
GLUCOSE BLD-MCNC: 78 MG/DL (ref 70–99)
HDLC SERPL-MCNC: 70 MG/DL
LDLC SERPL CALC-MCNC: 175 MG/DL
NONHDLC SERPL-MCNC: 190 MG/DL
TRIGL SERPL-MCNC: 74 MG/DL

## 2022-07-28 PROCEDURE — 77080 DXA BONE DENSITY AXIAL: CPT | Performed by: INTERNAL MEDICINE

## 2022-08-01 ENCOUNTER — HOSPITAL ENCOUNTER (OUTPATIENT)
Dept: CARDIOLOGY | Facility: CLINIC | Age: 62
Discharge: HOME | End: 2022-08-01
Attending: NURSE PRACTITIONER | Admitting: NURSE PRACTITIONER
Payer: COMMERCIAL

## 2022-08-01 ENCOUNTER — MYC MEDICAL ADVICE (OUTPATIENT)
Dept: PEDIATRICS | Facility: CLINIC | Age: 62
End: 2022-08-01

## 2022-08-01 DIAGNOSIS — M81.0 AGE-RELATED OSTEOPOROSIS WITHOUT CURRENT PATHOLOGICAL FRACTURE: Primary | ICD-10-CM

## 2022-08-01 DIAGNOSIS — R07.89 CHEST TIGHTNESS: ICD-10-CM

## 2022-08-01 PROCEDURE — 93018 CV STRESS TEST I&R ONLY: CPT | Performed by: INTERNAL MEDICINE

## 2022-08-01 PROCEDURE — 93017 CV STRESS TEST TRACING ONLY: CPT

## 2022-08-01 PROCEDURE — 93016 CV STRESS TEST SUPVJ ONLY: CPT | Performed by: INTERNAL MEDICINE

## 2022-08-02 ENCOUNTER — LAB (OUTPATIENT)
Dept: LAB | Facility: CLINIC | Age: 62
End: 2022-08-02
Payer: COMMERCIAL

## 2022-08-02 ENCOUNTER — TELEPHONE (OUTPATIENT)
Dept: PEDIATRICS | Facility: CLINIC | Age: 62
End: 2022-08-02

## 2022-08-02 DIAGNOSIS — R94.39 ABNORMAL CARDIOVASCULAR STRESS TEST: Primary | ICD-10-CM

## 2022-08-02 DIAGNOSIS — R07.9 CHEST PAIN ON EXERTION: Primary | ICD-10-CM

## 2022-08-02 DIAGNOSIS — Z82.49 FAMILY HISTORY OF ISCHEMIC HEART DISEASE: ICD-10-CM

## 2022-08-02 DIAGNOSIS — E78.5 HYPERLIPIDEMIA LDL GOAL <100: ICD-10-CM

## 2022-08-02 DIAGNOSIS — R94.39 ABNORMAL CARDIOVASCULAR STRESS TEST: ICD-10-CM

## 2022-08-02 LAB
BASOPHILS # BLD AUTO: 0 10E3/UL (ref 0–0.2)
BASOPHILS NFR BLD AUTO: 0 %
EOSINOPHIL # BLD AUTO: 0.2 10E3/UL (ref 0–0.7)
EOSINOPHIL NFR BLD AUTO: 4 %
ERYTHROCYTE [DISTWIDTH] IN BLOOD BY AUTOMATED COUNT: 12.6 % (ref 10–15)
HCT VFR BLD AUTO: 42.5 % (ref 35–47)
HGB BLD-MCNC: 14 G/DL (ref 11.7–15.7)
LYMPHOCYTES # BLD AUTO: 1.6 10E3/UL (ref 0.8–5.3)
LYMPHOCYTES NFR BLD AUTO: 26 %
MCH RBC QN AUTO: 31.5 PG (ref 26.5–33)
MCHC RBC AUTO-ENTMCNC: 32.9 G/DL (ref 31.5–36.5)
MCV RBC AUTO: 96 FL (ref 78–100)
MONOCYTES # BLD AUTO: 0.5 10E3/UL (ref 0–1.3)
MONOCYTES NFR BLD AUTO: 9 %
NEUTROPHILS # BLD AUTO: 3.7 10E3/UL (ref 1.6–8.3)
NEUTROPHILS NFR BLD AUTO: 62 %
PLATELET # BLD AUTO: 297 10E3/UL (ref 150–450)
RBC # BLD AUTO: 4.44 10E6/UL (ref 3.8–5.2)
WBC # BLD AUTO: 6 10E3/UL (ref 4–11)

## 2022-08-02 PROCEDURE — 80053 COMPREHEN METABOLIC PANEL: CPT

## 2022-08-02 PROCEDURE — 36415 COLL VENOUS BLD VENIPUNCTURE: CPT

## 2022-08-02 PROCEDURE — 85025 COMPLETE CBC W/AUTO DIFF WBC: CPT

## 2022-08-02 NOTE — TELEPHONE ENCOUNTER
Called patient to review results of exercise stress test and offer reassurance. It looks like she was able to schedule with cardiology on 8/4. Counseled on red flag symptoms warranting presentation to the ED. All questions answered.     Shannon Arciniega, DNP, APRN, CNP

## 2022-08-03 LAB
ALBUMIN SERPL-MCNC: 4 G/DL (ref 3.4–5)
ALP SERPL-CCNC: 70 U/L (ref 40–150)
ALT SERPL W P-5'-P-CCNC: 21 U/L (ref 0–50)
ANION GAP SERPL CALCULATED.3IONS-SCNC: 7 MMOL/L (ref 3–14)
AST SERPL W P-5'-P-CCNC: 20 U/L (ref 0–45)
BILIRUB SERPL-MCNC: 0.4 MG/DL (ref 0.2–1.3)
BUN SERPL-MCNC: 14 MG/DL (ref 7–30)
CALCIUM SERPL-MCNC: 9.5 MG/DL (ref 8.5–10.1)
CHLORIDE BLD-SCNC: 107 MMOL/L (ref 94–109)
CO2 SERPL-SCNC: 27 MMOL/L (ref 20–32)
CREAT SERPL-MCNC: 0.86 MG/DL (ref 0.52–1.04)
GFR SERPL CREATININE-BSD FRML MDRD: 75 ML/MIN/1.73M2
GLUCOSE BLD-MCNC: 102 MG/DL (ref 70–99)
POTASSIUM BLD-SCNC: 4 MMOL/L (ref 3.4–5.3)
PROT SERPL-MCNC: 7.3 G/DL (ref 6.8–8.8)
SODIUM SERPL-SCNC: 141 MMOL/L (ref 133–144)

## 2022-08-04 ENCOUNTER — OFFICE VISIT (OUTPATIENT)
Dept: CARDIOLOGY | Facility: CLINIC | Age: 62
End: 2022-08-04
Attending: NURSE PRACTITIONER
Payer: COMMERCIAL

## 2022-08-04 VITALS
HEIGHT: 66 IN | DIASTOLIC BLOOD PRESSURE: 80 MMHG | OXYGEN SATURATION: 98 % | BODY MASS INDEX: 23.14 KG/M2 | WEIGHT: 144 LBS | SYSTOLIC BLOOD PRESSURE: 122 MMHG | HEART RATE: 78 BPM

## 2022-08-04 DIAGNOSIS — R94.39 ABNORMAL CARDIOVASCULAR STRESS TEST: ICD-10-CM

## 2022-08-04 DIAGNOSIS — E78.00 PURE HYPERCHOLESTEROLEMIA: ICD-10-CM

## 2022-08-04 DIAGNOSIS — Z82.49 FAMILY HISTORY OF ISCHEMIC HEART DISEASE: ICD-10-CM

## 2022-08-04 DIAGNOSIS — R07.9 CHEST PAIN ON EXERTION: Primary | ICD-10-CM

## 2022-08-04 PROCEDURE — 99205 OFFICE O/P NEW HI 60 MIN: CPT | Performed by: INTERNAL MEDICINE

## 2022-08-04 PROCEDURE — 93000 ELECTROCARDIOGRAM COMPLETE: CPT | Performed by: INTERNAL MEDICINE

## 2022-08-04 RX ORDER — ROSUVASTATIN CALCIUM 20 MG/1
20 TABLET, COATED ORAL DAILY
Qty: 30 TABLET | Refills: 4 | Status: SHIPPED | OUTPATIENT
Start: 2022-08-04 | End: 2022-12-29

## 2022-08-04 RX ORDER — CARVEDILOL 3.12 MG/1
3.12 TABLET ORAL 2 TIMES DAILY WITH MEALS
Qty: 60 TABLET | Refills: 4 | Status: SHIPPED | OUTPATIENT
Start: 2022-08-04 | End: 2022-11-30

## 2022-08-04 RX ORDER — NITROGLYCERIN 0.4 MG/1
TABLET SUBLINGUAL
Qty: 15 TABLET | Refills: 4 | Status: SHIPPED | OUTPATIENT
Start: 2022-08-04 | End: 2024-02-26

## 2022-08-04 NOTE — PROGRESS NOTES
Service Date: 08/04/2022    REASONS FOR VISIT:    1.  New-onset chest pain on exertion.  2.  Abnormal cardiovascular stress test.    HISTORY OF PRESENT ILLNESS:    It was my pleasure to visit with Julia who was unaccompanied today.  She is new to Cardiology.  She is a delightful, younger-appearing 61-year-old, nonobese  lady, retired , lives with her .  Never-tobacco user.  BMI 23 kg/m2.  Not known to have any chronic medical diseases and not on prescription medications.    Julia has had exertional chest pain over the last 2 months.  She describes a central chest tightness, radiating down her left arm, associated with dizziness.  Comes on with accustomed exercise such as going for short walks or walking up and down flights of stairs.  Relieved with rest.  No rest symptoms.  No presyncope or syncope.    She underwent a treadmill stress test earlier this week that was abnormal for inducible ischemia.  She exercised for 7 minutes, developed chest pain that persisted into recovery associated with ischemic ECG changes in the inferior leads.  She had a hypertensive response to exercise with a resting BP of 130/78 and peak BP of 192/83 mmHg associated with chest pain.    Her labs are pertinent for very high LDL of 175, normal, HDL of 70, triglycerides 74, total 260.  Creatinine 0.86.  Normal CBC.    Resting ECG shows sinus rhythm of 76 BPM but she has negative precordial T waves in anteroseptal leads V1-V2.  No evidence of previous infarct.  Normal cardiac intervals.    RISK FACTORS:  Untreated hypercholesterolemia with very high LDL of 175, never-tobacco user, normotensive, no diabetes or stroke, normal creatinine, family history of premature coronary artery disease.    FAMILY HISTORY:  Her father had nonfatal myocardial infarctions starting in his 30s with subsequent stenting, lived to be his 70s.  Mother has hypertension and hypercholesterolemia.  Father was a tobacco user and  had high cholesterol.    PHYSICAL EXAMINATION:    GENERAL:  Understandably very anxious.  Normal body habitus.  Good muscle mass.  CARDIOVASCULAR:  Regular heart sounds, normal JVP.  Undisplaced apical impulse.  No murmur or pericardial friction rub.  VASCULAR:  No carotid bruit, well palpable distal pulses.    RESPIRATORY:  Normal breath sounds.  EXTREMITIES:  No edema.    DIAGNOSES:    1.  New-onset angina.  2.  Abnormal cardiovascular stress test.  3.  Hypercholesterolemia with LDL of 175.  4.  Family history of premature coronary artery disease and nonfatal coronary artery disease in father.  Never-tobacco user.    ASSESSMENT:    Symptoms and stress test concerning for new-onset angina.  Risk factors of age 61, untreated LDL of 175, family history of premature CAD.     PLAN:    1.  Heart catheterization and intervention as needed.  Normal creatinine.  No contrast allergy or contraindication to DAPT.  Risks and benefits explained.  Informed consent obtained.      2.  Medications:    -  Start aspirin 81 mg.  -  Start rosuvastatin 20 mg.  -  Start carvedilol 3.125 mg 2 times daily.  -  Sublingual nitroglycerin prescription.    3.  Transthoracic echocardiogram.    4.  Counseled the patient that if she develops a prolonged or severe episode of chest pain, call 911 and take sublingual nitroglycerin.    5.  Post-cath followup with Cardiology JENIFER.    6.  Follow up with me in 6 months with pre-visit fasting lipids and liver enzymes.    Total time today was 60 minutes.  High-complexity medical decision making.    Sienna Alston MD        D: 2022   T: 2022   MT: [email or web address]    Name:     VALENTINA ERICKSON  MRN:      [id number]        Account:      [id number]   :      1960           Service Date: 2022       Document: [id number]

## 2022-08-04 NOTE — PATIENT INSTRUCTIONS
MEDICATIONS:  1.  Start over-the-counter aspirin 81 mg daily.  2.  Start a cholesterol-lowering medication called rosuvastatin 20 mg.  Take 1 tablet daily.  3.  Start a beta-blocker called carvedilol 3.125 mg.  Take 1 tablet two times daily.  4.  Prescription for sublingual nitroglycerin to use as needed.    TESTING AND FOLLOW-UP:  1.  Heart ultrasound or echocardiogram.  2.  Heart catheterization.  3.  Cardiology JENIFER follow-up.  4.  Follow-up with Dr. Alston in 6 months.    If you have any questions or concerns, please contact my nurses at [phone].

## 2022-08-04 NOTE — LETTER
"8/4/2022    Shannon Arciniega, APRN CNP  [address] [place] [address]    RE: Julia Key       Dear Colleague,     I had the pleasure of seeing Julia ERIKA Key in the [facility].    Clinic visit note dictated. Dictation reference number - [id number]        Today's clinic visit entailed:  Review of the result(s) of each unique test - Stress test, ECG, labs.  Ordering of each unique test  Prescription drug management  60 minutes spent on the date of the encounter doing chart review, history and exam, documentation and further activities per the note          Encounter Diagnoses   Name Primary?     Chest pain on exertion Yes     Abnormal cardiovascular stress test      Family history of ischemic heart disease      Pure hypercholesterolemia          Orders Placed This Encounter   Procedures     Comprehensive metabolic panel     Lipid Profile     Echocardiogram Complete     Case Request Cath Lab: Coronary Angiogram         Vitals: /80 (BP Location: Right arm, Patient Position: Sitting, Cuff Size: Adult Regular)   Pulse 78   Ht 1.676 m (5' 6\")   Wt 65.3 kg (144 lb)   SpO2 98%   BMI 23.24 kg/m    Wt Readings from Last 5 Encounters:   08/04/22 65.3 kg (144 lb)   07/27/22 64 kg (141 lb)   03/29/21 63.5 kg (140 lb 1.6 oz)   12/16/16 68.5 kg (151 lb 1.6 oz)   07/29/16 69.4 kg (153 lb)               CURRENT MEDICATIONS:  Current Outpatient Medications   Medication Sig Dispense Refill     aspirin (ASA) 81 MG EC tablet Take 1 tablet (81 mg) by mouth daily       CALCIUM-VITAMIN D PO Take 1 tablet by mouth 2 times daily       carvedilol (COREG) 3.125 MG tablet Take 1 tablet (3.125 mg) by mouth 2 times daily (with meals) 60 tablet 4     cetirizine (ZYRTEC) 10 MG tablet Take 10 mg by mouth daily       nitroGLYcerin (NITROSTAT) 0.4 MG sublingual tablet For chest pain place 1 tablet under the tongue every 5 minutes for 3 doses. If symptoms persist 5 minutes after 1st dose call 911. 15 tablet 4 "     rosuvastatin (CRESTOR) 20 MG tablet Take 1 tablet (20 mg) by mouth daily 30 tablet 4     triamcinolone (KENALOG) 0.1 % external cream Apply topically 2 times daily 45 g 0         ALLERGIES:  Allergies   Allergen Reactions     Iodine Hives     Latex        PAST MEDICAL HISTORY:    Past Medical History:   Diagnosis Date     Abnormal cardiovascular stress test 8/2/2022     Depression, prolonged     Cyclical related to 's health     Hyperlipidemia     One high reading late Fall 2016     Osteopenia        PAST SURGICAL HISTORY:    Past Surgical History:   Procedure Laterality Date     ZZC LT SHOULDER G/E 2 VIEWS  01/01/1999 1997, 1999 - decompression, clavicle resection       FAMILY HISTORY:    Family History   Problem Relation Age of Onset     Osteoporosis Mother      Dementia Mother      Hyperlipidemia Mother      Hypertension Father      Cerebrovascular Disease Father      Coronary Artery Disease Father 30     Coronary Artery Disease Brother      No Known Problems Maternal Grandmother      No Known Problems Maternal Grandfather      Myocardial Infarction Paternal Grandmother      Obesity Paternal Grandmother        SOCIAL HISTORY:    Social History     Socioeconomic History     Marital status:      Spouse name: None     Number of children: None     Years of education: None     Highest education level: None   Occupational History     Occupation: Retired .   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 One daughter, one dog.     Social Determinants of Health     Financial Resource Strain: Low Risk       Difficulty of Paying Living Expenses: Not hard at all   Food Insecurity: No Food Insecurity     Worried About Running Out of Food in the Last Year: Never true     Ran Out of Food in the Last Year: Never true   Transportation Needs: No Transportation Needs     Lack of Transportation (Medical): No     Lack of Transportation (Non-Medical): No   Physical Activity: Insufficiently Active     Days of Exercise per Week: 3 days     Minutes of Exercise per Session: 30 min   Stress: No Stress Concern Present     Feeling of Stress : Not at all   Social Connections: Moderately Integrated     Frequency of Communication with Friends and Family: More than three times a week     Frequency of Social Gatherings with Friends and Family: Once a week     Attends Latter-day Services: 1 to 4 times per year     Active Member of Clubs or Organizations: No     Marital Status:    Housing Stability: Low Risk      Unable to Pay for Housing in the Last Year: No     Number of Places Lived in the Last Year: 1     Unstable Housing in the Last Year: No         CC  REFERRING PROVIDER:  DARLENE Acevedo CNP  [address] [place] [address]                  Service Date: 08/04/2022    REASONS FOR VISIT:    1.  New-onset chest pain on exertion.  2.  Abnormal cardiovascular stress test.    HISTORY OF PRESENT ILLNESS:    It was my pleasure to visit with Julia who was unaccompanied today.  She is new to Cardiology.  She is a delightful, younger-appearing 61-year-old, nonobese  lady, retired , lives with her .  Never-tobacco user.  BMI 23 kg/m2.  Not known to have any chronic medical diseases and not on prescription medications.    Julia has had exertional chest pain over the last 2 months.  She describes a central chest tightness, radiating down her left arm, associated with dizziness.  Comes on with accustomed exercise such as going for short walks or walking up and down flights of  stairs.  Relieved with rest.  No rest symptoms.  No presyncope or syncope.    She underwent a treadmill stress test earlier this week that was abnormal for inducible ischemia.  She exercised for 7 minutes, developed chest pain that persisted into recovery associated with ischemic ECG changes in the inferior leads.  She had a hypertensive response to exercise with a resting BP of 130/78 and peak BP of 192/83 mmHg associated with chest pain.    Her labs are pertinent for very high LDL of 175, normal, HDL of 70, triglycerides 74, total 260.  Creatinine 0.86.  Normal CBC.    Resting ECG shows sinus rhythm of 76 BPM but she has negative precordial T waves in anteroseptal leads V1-V2.  No evidence of previous infarct.  Normal cardiac intervals.    RISK FACTORS:  Untreated hypercholesterolemia with very high LDL of 175, never-tobacco user, normotensive, no diabetes or stroke, normal creatinine, family history of premature coronary artery disease.    FAMILY HISTORY:  Her father had nonfatal myocardial infarctions starting in his 30s with subsequent stenting, lived to be his 70s.  Mother has hypertension and hypercholesterolemia.  Father was a tobacco user and had high cholesterol.    PHYSICAL EXAMINATION:    GENERAL:  Understandably very anxious.  Normal body habitus.  Good muscle mass.  CARDIOVASCULAR:  Regular heart sounds, normal JVP.  Undisplaced apical impulse.  No murmur or pericardial friction rub.  VASCULAR:  No carotid bruit, well palpable distal pulses.    RESPIRATORY:  Normal breath sounds.  EXTREMITIES:  No edema.    DIAGNOSES:    1.  New-onset angina.  2.  Abnormal cardiovascular stress test.  3.  Hypercholesterolemia with LDL of 175.  4.  Family history of premature coronary artery disease and nonfatal coronary artery disease in father.  Never-tobacco user.    ASSESSMENT:    Symptoms and stress test concerning for new-onset angina.  Risk factors of age 61, untreated LDL of 175, family history of premature CAD.      PLAN:    1.  Heart catheterization and intervention as needed.  Normal creatinine.  No contrast allergy or contraindication to DAPT.  Risks and benefits explained.  Informed consent obtained.      2.  Medications:    -  Start aspirin 81 mg.  -  Start rosuvastatin 20 mg.  -  Start carvedilol 3.125 mg 2 times daily.  -  Sublingual nitroglycerin prescription.    3.  Transthoracic echocardiogram.    4.  Counseled the patient that if she develops a prolonged or severe episode of chest pain, call 911 and take sublingual nitroglycerin.    5.  Post-cath followup with Cardiology JENIFER.    6.  Follow up with me in 6 months with pre-visit fasting lipids and liver enzymes.    Total time today was 60 minutes.  High-complexity medical decision making.    Sienna Alston MD        D: 2022   T: 2022   MT: [email or web address]    Name:     VALENTINA ERICKSON  MRN:      [id number]        Account:      [id number]   :      1960           Service Date: 2022       Document: [id number]      Thank you for allowing me to participate in the care of your patient.      Sincerely,     Sienna Alston MD     [facility]  cc:   Shannon Arciniega, DARLENE CNP  [address] [place] [address]

## 2022-08-04 NOTE — PROGRESS NOTES
"  Clinic visit note dictated. Dictation reference number - [id number]        Today's clinic visit entailed:  Review of the result(s) of each unique test - Stress test, ECG, labs.  Ordering of each unique test  Prescription drug management  60 minutes spent on the date of the encounter doing chart review, history and exam, documentation and further activities per the note          Encounter Diagnoses   Name Primary?     Chest pain on exertion Yes     Abnormal cardiovascular stress test      Family history of ischemic heart disease      Pure hypercholesterolemia          Orders Placed This Encounter   Procedures     Comprehensive metabolic panel     Lipid Profile     Echocardiogram Complete     Case Request Cath Lab: Coronary Angiogram         Vitals: /80 (BP Location: Right arm, Patient Position: Sitting, Cuff Size: Adult Regular)   Pulse 78   Ht 1.676 m (5' 6\")   Wt 65.3 kg (144 lb)   SpO2 98%   BMI 23.24 kg/m    Wt Readings from Last 5 Encounters:   08/04/22 65.3 kg (144 lb)   07/27/22 64 kg (141 lb)   03/29/21 63.5 kg (140 lb 1.6 oz)   12/16/16 68.5 kg (151 lb 1.6 oz)   07/29/16 69.4 kg (153 lb)               CURRENT MEDICATIONS:  Current Outpatient Medications   Medication Sig Dispense Refill     aspirin (ASA) 81 MG EC tablet Take 1 tablet (81 mg) by mouth daily       CALCIUM-VITAMIN D PO Take 1 tablet by mouth 2 times daily       carvedilol (COREG) 3.125 MG tablet Take 1 tablet (3.125 mg) by mouth 2 times daily (with meals) 60 tablet 4     cetirizine (ZYRTEC) 10 MG tablet Take 10 mg by mouth daily       nitroGLYcerin (NITROSTAT) 0.4 MG sublingual tablet For chest pain place 1 tablet under the tongue every 5 minutes for 3 doses. If symptoms persist 5 minutes after 1st dose call 911. 15 tablet 4     rosuvastatin (CRESTOR) 20 MG tablet Take 1 tablet (20 mg) by mouth daily 30 tablet 4     triamcinolone (KENALOG) 0.1 % external cream Apply topically 2 times daily 45 g 0         ALLERGIES:  Allergies "   Allergen Reactions     Iodine Hives     Latex        PAST MEDICAL HISTORY:    Past Medical History:   Diagnosis Date     Abnormal cardiovascular stress test 8/2/2022     Depression, prolonged     Cyclical related to 's health     Hyperlipidemia     One high reading late Fall 2016     Osteopenia        PAST SURGICAL HISTORY:    Past Surgical History:   Procedure Laterality Date     ZZC LT SHOULDER G/E 2 VIEWS  01/01/1999 1997, 1999 - decompression, clavicle resection       FAMILY HISTORY:    Family History   Problem Relation Age of Onset     Osteoporosis Mother      Dementia Mother      Hyperlipidemia Mother      Hypertension Father      Cerebrovascular Disease Father      Coronary Artery Disease Father 30     Coronary Artery Disease Brother      No Known Problems Maternal Grandmother      No Known Problems Maternal Grandfather      Myocardial Infarction Paternal Grandmother      Obesity Paternal Grandmother        SOCIAL HISTORY:    Social History     Socioeconomic History     Marital status:      Spouse name: None     Number of children: None     Years of education: None     Highest education level: None   Occupational History     Occupation: Retired .   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 One daughter, one dog.     Social Determinants of Health     Financial Resource Strain: Low Risk      Difficulty of Paying Living Expenses: Not hard at all   Food Insecurity: No Food Insecurity     Worried About Running Out of Food in the Last Year: Never true     Ran Out of Food in the Last Year: Never  true   Transportation Needs: No Transportation Needs     Lack of Transportation (Medical): No     Lack of Transportation (Non-Medical): No   Physical Activity: Insufficiently Active     Days of Exercise per Week: 3 days     Minutes of Exercise per Session: 30 min   Stress: No Stress Concern Present     Feeling of Stress : Not at all   Social Connections: Moderately Integrated     Frequency of Communication with Friends and Family: More than three times a week     Frequency of Social Gatherings with Friends and Family: Once a week     Attends Holiness Services: 1 to 4 times per year     Active Member of Clubs or Organizations: No     Marital Status:    Housing Stability: Low Risk      Unable to Pay for Housing in the Last Year: No     Number of Places Lived in the Last Year: 1     Unstable Housing in the Last Year: No         CC  REFERRING PROVIDER:  DARLENE Acevedo CNP  [address] [place] [address]

## 2022-08-08 ENCOUNTER — TELEPHONE (OUTPATIENT)
Dept: CARDIOLOGY | Facility: CLINIC | Age: 62
End: 2022-08-08

## 2022-08-08 DIAGNOSIS — R94.39 ABNORMAL CARDIOVASCULAR STRESS TEST: Primary | ICD-10-CM

## 2022-08-08 RX ORDER — SODIUM CHLORIDE 9 MG/ML
INJECTION, SOLUTION INTRAVENOUS CONTINUOUS
Status: CANCELLED | OUTPATIENT
Start: 2022-08-08

## 2022-08-08 RX ORDER — ASPIRIN 325 MG
325 TABLET ORAL ONCE
Status: CANCELLED | OUTPATIENT
Start: 2022-08-08 | End: 2022-08-08

## 2022-08-08 RX ORDER — POTASSIUM CHLORIDE 1500 MG/1
20 TABLET, EXTENDED RELEASE ORAL
Status: CANCELLED | OUTPATIENT
Start: 2022-08-08

## 2022-08-08 RX ORDER — LIDOCAINE 40 MG/G
CREAM TOPICAL
Status: CANCELLED | OUTPATIENT
Start: 2022-08-08

## 2022-08-08 RX ORDER — ASPIRIN 81 MG/1
243 TABLET, CHEWABLE ORAL ONCE
Status: CANCELLED | OUTPATIENT
Start: 2022-08-08

## 2022-08-08 NOTE — TELEPHONE ENCOUNTER
Contacted patient to review pre-cath procedures: patient is scheduled for coronary angiogram (left)  on 8/11/2022.   Patient's arrival time is 0630 and procedure time is 0830. Reviewed that these times are not precise due to unplanned emergent needs of other patients.  Patient is aware to be NPO except for medications.  Patient has arranged for transportation and 24 hour f/u care.   Patient has no known contract dye allergy.   Patient is not diabetic.  Patient is not taking medication.   Patient's renal function is WNL for procedure.   Patient will continue daily aspirin dose 81mg with 3 additional doses the morning of the procedure.   Patient reminded to check temperature the morning of procedure; if T>100 then call [facility] @ [phone] for instructions.  Order for procedure entered.    COVID-19 test will be done at home and the patient will bring a photo of the results.

## 2022-08-10 ENCOUNTER — TELEPHONE (OUTPATIENT)
Dept: CARDIOLOGY | Facility: CLINIC | Age: 62
End: 2022-08-10

## 2022-08-10 ENCOUNTER — HOSPITAL ENCOUNTER (OUTPATIENT)
Dept: CARDIOLOGY | Facility: CLINIC | Age: 62
Discharge: HOME | End: 2022-08-10
Attending: INTERNAL MEDICINE | Admitting: INTERNAL MEDICINE
Payer: COMMERCIAL

## 2022-08-10 ENCOUNTER — TELEPHONE (OUTPATIENT)
Dept: MEDSURG UNIT | Facility: CLINIC | Age: 62
End: 2022-08-10

## 2022-08-10 DIAGNOSIS — R94.39 ABNORMAL CARDIOVASCULAR STRESS TEST: ICD-10-CM

## 2022-08-10 DIAGNOSIS — Z82.49 FAMILY HISTORY OF ISCHEMIC HEART DISEASE: ICD-10-CM

## 2022-08-10 DIAGNOSIS — R07.9 CHEST PAIN ON EXERTION: ICD-10-CM

## 2022-08-10 LAB — LVEF ECHO: NORMAL

## 2022-08-10 PROCEDURE — 93306 TTE W/DOPPLER COMPLETE: CPT | Mod: 26 | Performed by: INTERNAL MEDICINE

## 2022-08-10 PROCEDURE — 93306 TTE W/DOPPLER COMPLETE: CPT

## 2022-08-10 NOTE — TELEPHONE ENCOUNTER
Heart cath tomorrow 8-11-22.     Echo done 8-10-22  The left ventricle visual ejection fraction is 55-60%.  Regional wall motion abnormalities cannot be excluded due to limited  visualization.  The right ventricular systolic function is normal.  The inferior vena cava was normal in size with preserved respiratory  Variability.    Next JENIFER OV 9-8-22.   Last Dr. Alston OV 8-4-11

## 2022-08-10 NOTE — TELEPHONE ENCOUNTER
Chart reviewed for upcoming procedure.  CSE and nursing orders in epic.  Note from clinic with explanation of Covid process and arrival time.  No additional pre call needed.

## 2022-08-11 ENCOUNTER — HOSPITAL ENCOUNTER (OUTPATIENT)
Facility: CLINIC | Age: 62
Discharge: HOME | End: 2022-08-11
Admitting: INTERNAL MEDICINE
Payer: COMMERCIAL

## 2022-08-11 VITALS
OXYGEN SATURATION: 99 % | WEIGHT: 142 LBS | BODY MASS INDEX: 22.82 KG/M2 | RESPIRATION RATE: 16 BRPM | DIASTOLIC BLOOD PRESSURE: 81 MMHG | HEART RATE: 55 BPM | SYSTOLIC BLOOD PRESSURE: 134 MMHG | HEIGHT: 66 IN | TEMPERATURE: 97 F

## 2022-08-11 DIAGNOSIS — R94.39 ABNORMAL CARDIOVASCULAR STRESS TEST: ICD-10-CM

## 2022-08-11 DIAGNOSIS — Z82.49 FAMILY HISTORY OF ISCHEMIC HEART DISEASE: ICD-10-CM

## 2022-08-11 DIAGNOSIS — R07.9 CHEST PAIN ON EXERTION: ICD-10-CM

## 2022-08-11 DIAGNOSIS — E78.00 PURE HYPERCHOLESTEROLEMIA: ICD-10-CM

## 2022-08-11 PROBLEM — Z98.890 STATUS POST CORONARY ANGIOGRAM: Status: ACTIVE | Noted: 2022-08-11

## 2022-08-11 LAB
ANION GAP SERPL CALCULATED.3IONS-SCNC: 3 MMOL/L (ref 3–14)
APTT PPP: 31 SECONDS (ref 22–38)
BUN SERPL-MCNC: 19 MG/DL (ref 7–30)
CALCIUM SERPL-MCNC: 9.4 MG/DL (ref 8.5–10.1)
CHLORIDE BLD-SCNC: 107 MMOL/L (ref 94–109)
CO2 SERPL-SCNC: 29 MMOL/L (ref 20–32)
CREAT SERPL-MCNC: 0.82 MG/DL (ref 0.52–1.04)
ERYTHROCYTE [DISTWIDTH] IN BLOOD BY AUTOMATED COUNT: 11.9 % (ref 10–15)
GFR SERPL CREATININE-BSD FRML MDRD: 80 ML/MIN/1.73M2
GLUCOSE BLD-MCNC: 98 MG/DL (ref 70–99)
HCT VFR BLD AUTO: 42.4 % (ref 35–47)
HGB BLD-MCNC: 13.8 G/DL (ref 11.7–15.7)
INR PPP: 1.03 (ref 0.85–1.15)
MCH RBC QN AUTO: 31.2 PG (ref 26.5–33)
MCHC RBC AUTO-ENTMCNC: 32.5 G/DL (ref 31.5–36.5)
MCV RBC AUTO: 96 FL (ref 78–100)
PLATELET # BLD AUTO: 275 10E3/UL (ref 150–450)
POTASSIUM BLD-SCNC: 4.2 MMOL/L (ref 3.4–5.3)
RBC # BLD AUTO: 4.43 10E6/UL (ref 3.8–5.2)
SODIUM SERPL-SCNC: 139 MMOL/L (ref 133–144)
WBC # BLD AUTO: 4.9 10E3/UL (ref 4–11)

## 2022-08-11 PROCEDURE — 36415 COLL VENOUS BLD VENIPUNCTURE: CPT | Performed by: INTERNAL MEDICINE

## 2022-08-11 PROCEDURE — 272N000001 HC OR GENERAL SUPPLY STERILE: Performed by: INTERNAL MEDICINE

## 2022-08-11 PROCEDURE — 93005 ELECTROCARDIOGRAM TRACING: CPT

## 2022-08-11 PROCEDURE — 36591 DRAW BLOOD OFF VENOUS DEVICE: CPT

## 2022-08-11 PROCEDURE — 999N000054 HC STATISTIC EKG NON-CHARGEABLE

## 2022-08-11 PROCEDURE — C1769 GUIDE WIRE: HCPCS | Performed by: INTERNAL MEDICINE

## 2022-08-11 PROCEDURE — 250N000011 HC RX IP 250 OP 636: Performed by: INTERNAL MEDICINE

## 2022-08-11 PROCEDURE — 93010 ELECTROCARDIOGRAM REPORT: CPT | Performed by: INTERNAL MEDICINE

## 2022-08-11 PROCEDURE — 85730 THROMBOPLASTIN TIME PARTIAL: CPT | Performed by: INTERNAL MEDICINE

## 2022-08-11 PROCEDURE — 258N000003 HC RX IP 258 OP 636: Performed by: INTERNAL MEDICINE

## 2022-08-11 PROCEDURE — 250N000009 HC RX 250: Performed by: INTERNAL MEDICINE

## 2022-08-11 PROCEDURE — 999N000184 HC STATISTIC TELEMETRY

## 2022-08-11 PROCEDURE — 85027 COMPLETE CBC AUTOMATED: CPT | Performed by: INTERNAL MEDICINE

## 2022-08-11 PROCEDURE — 93458 L HRT ARTERY/VENTRICLE ANGIO: CPT | Mod: 26 | Performed by: INTERNAL MEDICINE

## 2022-08-11 PROCEDURE — 80048 BASIC METABOLIC PNL TOTAL CA: CPT | Performed by: INTERNAL MEDICINE

## 2022-08-11 PROCEDURE — 999N000071 HC STATISTIC HEART CATH LAB OR EP LAB

## 2022-08-11 PROCEDURE — 85610 PROTHROMBIN TIME: CPT | Performed by: INTERNAL MEDICINE

## 2022-08-11 PROCEDURE — 99152 MOD SED SAME PHYS/QHP 5/>YRS: CPT | Performed by: INTERNAL MEDICINE

## 2022-08-11 PROCEDURE — 99152 MOD SED SAME PHYS/QHP 5/>YRS: CPT | Mod: GC | Performed by: INTERNAL MEDICINE

## 2022-08-11 PROCEDURE — 93458 L HRT ARTERY/VENTRICLE ANGIO: CPT | Performed by: INTERNAL MEDICINE

## 2022-08-11 PROCEDURE — C1894 INTRO/SHEATH, NON-LASER: HCPCS | Performed by: INTERNAL MEDICINE

## 2022-08-11 PROCEDURE — C1725 CATH, TRANSLUMIN NON-LASER: HCPCS | Performed by: INTERNAL MEDICINE

## 2022-08-11 RX ORDER — FLUMAZENIL 0.1 MG/ML
0.2 INJECTION, SOLUTION INTRAVENOUS
Status: DISCONTINUED | OUTPATIENT
Start: 2022-08-11 | End: 2022-08-11 | Stop reason: HOSPADM

## 2022-08-11 RX ORDER — HEPARIN SODIUM 1000 [USP'U]/ML
INJECTION, SOLUTION INTRAVENOUS; SUBCUTANEOUS
Status: DISCONTINUED | OUTPATIENT
Start: 2022-08-11 | End: 2022-08-11 | Stop reason: HOSPADM

## 2022-08-11 RX ORDER — VERAPAMIL HYDROCHLORIDE 2.5 MG/ML
INJECTION, SOLUTION INTRAVENOUS
Status: DISCONTINUED | OUTPATIENT
Start: 2022-08-11 | End: 2022-08-11 | Stop reason: HOSPADM

## 2022-08-11 RX ORDER — SODIUM CHLORIDE 9 MG/ML
INJECTION, SOLUTION INTRAVENOUS CONTINUOUS
Status: DISCONTINUED | OUTPATIENT
Start: 2022-08-11 | End: 2022-08-11 | Stop reason: HOSPADM

## 2022-08-11 RX ORDER — LIDOCAINE 40 MG/G
CREAM TOPICAL
Status: DISCONTINUED | OUTPATIENT
Start: 2022-08-11 | End: 2022-08-11 | Stop reason: HOSPADM

## 2022-08-11 RX ORDER — POTASSIUM CHLORIDE 1500 MG/1
20 TABLET, EXTENDED RELEASE ORAL
Status: DISCONTINUED | OUTPATIENT
Start: 2022-08-11 | End: 2022-08-11 | Stop reason: HOSPADM

## 2022-08-11 RX ORDER — NALOXONE HYDROCHLORIDE 0.4 MG/ML
0.2 INJECTION, SOLUTION INTRAMUSCULAR; INTRAVENOUS; SUBCUTANEOUS
Status: DISCONTINUED | OUTPATIENT
Start: 2022-08-11 | End: 2022-08-11 | Stop reason: HOSPADM

## 2022-08-11 RX ORDER — ASPIRIN 325 MG
325 TABLET ORAL ONCE
Status: DISCONTINUED | OUTPATIENT
Start: 2022-08-11 | End: 2022-08-11 | Stop reason: HOSPADM

## 2022-08-11 RX ORDER — ASPIRIN 81 MG/1
243 TABLET, CHEWABLE ORAL ONCE
Status: DISCONTINUED | OUTPATIENT
Start: 2022-08-11 | End: 2022-08-11 | Stop reason: HOSPADM

## 2022-08-11 RX ORDER — OXYCODONE HYDROCHLORIDE 5 MG/1
5 TABLET ORAL EVERY 4 HOURS PRN
Status: DISCONTINUED | OUTPATIENT
Start: 2022-08-11 | End: 2022-08-11 | Stop reason: HOSPADM

## 2022-08-11 RX ORDER — ACETAMINOPHEN 325 MG/1
650 TABLET ORAL EVERY 4 HOURS PRN
Status: DISCONTINUED | OUTPATIENT
Start: 2022-08-11 | End: 2022-08-11 | Stop reason: HOSPADM

## 2022-08-11 RX ORDER — FENTANYL CITRATE 50 UG/ML
25 INJECTION, SOLUTION INTRAMUSCULAR; INTRAVENOUS
Status: DISCONTINUED | OUTPATIENT
Start: 2022-08-11 | End: 2022-08-11 | Stop reason: HOSPADM

## 2022-08-11 RX ORDER — OXYCODONE HYDROCHLORIDE 5 MG/1
10 TABLET ORAL EVERY 4 HOURS PRN
Status: DISCONTINUED | OUTPATIENT
Start: 2022-08-11 | End: 2022-08-11 | Stop reason: HOSPADM

## 2022-08-11 RX ORDER — ATROPINE SULFATE 0.1 MG/ML
0.5 INJECTION INTRAVENOUS
Status: DISCONTINUED | OUTPATIENT
Start: 2022-08-11 | End: 2022-08-11 | Stop reason: HOSPADM

## 2022-08-11 RX ORDER — NALOXONE HYDROCHLORIDE 0.4 MG/ML
0.4 INJECTION, SOLUTION INTRAMUSCULAR; INTRAVENOUS; SUBCUTANEOUS
Status: DISCONTINUED | OUTPATIENT
Start: 2022-08-11 | End: 2022-08-11 | Stop reason: HOSPADM

## 2022-08-11 RX ORDER — NITROGLYCERIN 5 MG/ML
VIAL (ML) INTRAVENOUS
Status: DISCONTINUED | OUTPATIENT
Start: 2022-08-11 | End: 2022-08-11 | Stop reason: HOSPADM

## 2022-08-11 RX ORDER — FENTANYL CITRATE 50 UG/ML
INJECTION, SOLUTION INTRAMUSCULAR; INTRAVENOUS
Status: DISCONTINUED | OUTPATIENT
Start: 2022-08-11 | End: 2022-08-11 | Stop reason: HOSPADM

## 2022-08-11 RX ADMIN — SODIUM CHLORIDE: 9 INJECTION, SOLUTION INTRAVENOUS at 07:15

## 2022-08-11 ASSESSMENT — ACTIVITIES OF DAILY LIVING (ADL)
ADLS_ACUITY_SCORE: 35

## 2022-08-11 NOTE — DISCHARGE INSTRUCTIONS
Cardiac Angiogram Discharge Instructions - Radial Artery (right wrist)    After you go home:    Have an adult stay with you until tomorrow.  Drink extra fluids for 2 days.  You may resume your normal diet.  No smoking       For 24 hours - due to the sedation you received:  Relax and take it easy.  Do NOT make any important or legal decisions.  Do NOT drive or operate machines at home or at work.  Do NOT drink alcohol.    Care of Wrist Puncture Site:    For the first 24 hrs - check the puncture site every 1-2 hours while awake.  It is normal to have soreness at the puncture site and mild tingling in your hand for up to 3 days.  Remove the bandaid after 24 hours. If there is minor oozing, apply another bandaid and remove it after 12 hours.  You may shower tomorrow.  Do NOT take a bath, or use a hot tub or pool for at least 3 days. Do NOT scrub the site. Do not use lotion or powder near the puncture site.           Activity:        For 2 days:   do not use your hand or arm to support your weight (such as rising from a chair)   do not bend your wrist (such as lifting a garage door).  do not lift more than 5 pounds or exercise your arm (such as tennis, golf or bowling).  Do NOT do any heavy activity such as exercise, lifting, or straining.     Bleeding:    If you start bleeding from the site in your wrist, sit down and press firmly on/above the site for 10 minutes.   Once bleeding stops, keep arm still for 2 hours.   Call [facility] as soon as you can.       Call 911 right away if you have heavy bleeding or bleeding that does not stop.      Medicines:    Take your medications, including blood thinners, unless your provider tells you not to.    If you have stopped any medicines, check with your provider about when to restart them.    Follow Up Appointments:    Follow up with [facility] Nurse Practitioner at [facility] of patient preference in 7-10 days.    Call the clinic if:    You have a large or growing hard lump  around the site.  The site is red, swollen, hot or tender.  Blood or fluid is draining from the site.  You have chills or a fever greater than 101 F (38 C).  Your arm feels numb, cool or changes color.  You have hives, a rash or unusual itching.  Any questions or concerns.          [facility] [place]:    [phone] [facility] (7 days a week)

## 2022-08-11 NOTE — PRE-PROCEDURE
GENERAL PRE-PROCEDURE:   Procedure:  Cor angio possible PCI  Date/Time:  8/11/2022 8:15 AM    Written consent obtained?: Yes    Risks and benefits: Risks, benefits and alternatives were discussed    Consent given by:  Patient  Patient states understanding of procedure being performed: Yes    Patient's understanding of procedure matches consent: Yes    Procedure consent matches procedure scheduled: Yes    Expected level of sedation:  Moderate  Appropriately NPO:  Yes  ASA Class:  3  Mallampati  :  Grade 1- soft palate, uvula, tonsillar pillars, and posterior pharyngeal wall visible  Lungs:  Lungs clear with good breath sounds bilaterally  Heart:  Normal heart sounds and rate  History & Physical reviewed:  History and physical reviewed and no updates needed  Statement of review:  I have reviewed the lab findings, diagnostic data, medications, and the plan for sedation

## 2022-08-11 NOTE — PRE-PROCEDURE
GENERAL PRE-PROCEDURE:   Procedure:  Coronary angiogram  Date/Time:  8/11/2022 8:10 AM    Written consent obtained?: Yes    Risks and benefits: Risks, benefits and alternatives were discussed    Consent given by:  Patient  Patient states understanding of procedure being performed: Yes    Patient's understanding of procedure matches consent: Yes    Procedure consent matches procedure scheduled: Yes    Expected level of sedation:  Moderate  Appropriately NPO:  Yes  ASA Class:  1  Mallampati  :  Grade 2- soft palate, base of uvula, tonsillar pillars, and portion of posterior pharyngeal wall visible  Lungs:  Lungs clear with good breath sounds bilaterally  Heart:  Normal heart sounds and rate  History & Physical reviewed:  History and physical reviewed and no updates needed  Statement of review:  I have reviewed the lab findings, diagnostic data, medications, and the plan for sedation    Lyssa Rocha MD

## 2022-08-19 LAB
ATRIAL RATE - MUSE: 58 BPM
DIASTOLIC BLOOD PRESSURE - MUSE: NORMAL MMHG
INTERPRETATION ECG - MUSE: NORMAL
P AXIS - MUSE: 80 DEGREES
PR INTERVAL - MUSE: 140 MS
QRS DURATION - MUSE: 78 MS
QT - MUSE: 402 MS
QTC - MUSE: 394 MS
R AXIS - MUSE: 70 DEGREES
SYSTOLIC BLOOD PRESSURE - MUSE: NORMAL MMHG
T AXIS - MUSE: 73 DEGREES
VENTRICULAR RATE- MUSE: 58 BPM

## 2022-09-08 ENCOUNTER — OFFICE VISIT (OUTPATIENT)
Dept: CARDIOLOGY | Facility: CLINIC | Age: 62
End: 2022-09-08
Payer: COMMERCIAL

## 2022-09-08 VITALS
HEART RATE: 75 BPM | DIASTOLIC BLOOD PRESSURE: 70 MMHG | SYSTOLIC BLOOD PRESSURE: 122 MMHG | HEIGHT: 66 IN | WEIGHT: 146.3 LBS | BODY MASS INDEX: 23.51 KG/M2 | OXYGEN SATURATION: 98 %

## 2022-09-08 DIAGNOSIS — Z98.890 S/P CORONARY ANGIOGRAM: Primary | ICD-10-CM

## 2022-09-08 DIAGNOSIS — E78.5 HYPERLIPIDEMIA LDL GOAL <70: ICD-10-CM

## 2022-09-08 PROCEDURE — 99214 OFFICE O/P EST MOD 30 MIN: CPT | Performed by: NURSE PRACTITIONER

## 2022-09-08 NOTE — PATIENT INSTRUCTIONS
Today's Plan:     1) OK to stop your aspirin.     2) Continue your rosuvastatin, let us know if muscle aches get worse and we can trial you on a different statin called atorvastatin.     3) Follow-up with Dr. Alston in about 6 months with repeat labs.     If you have questions or concerns please call my nurse team at [phone].     Scheduling phone number: [phone]    Reminder: Please bring in all current medications, over the counter supplements and vitamin bottles to your next appointment.-    It was a pleasure seeing you today!     Jody Caballero, ARTIE  9/8/2022    -

## 2022-09-08 NOTE — LETTER
9/8/2022    Shannon Arciniega, APRN CNP  [address] [place] [address]    RE: Julia Key       Dear Colleague,     I had the pleasure of seeing Julia Key in the [facility].  Cardiology Clinic Progress Note  Julia Key MRN# [id number]   YOB: 1960 Age: 61 year old     Primary cardiologist: Dr. Alston     Reason for visit: s/p coronary angiogram     History of presenting illness:    Julia Key is an extremely pleasant and young 61 year old female with no significant past medical history who was recently seen in cardiology consultation following abnormal stress test that was ordered by her PCP for ongoing exertional chest discomfort.  Her exercise stress test was positive for inducible ischemia.  She exercised for 7 minutes, developed chest pain that persisted into recovery associated with ischemic EKG changes in the inferior leads.  She had a hypertensive response to exercise with a resting blood pressure 130/78 and a peak blood pressure of 192/83 associated with chest pain.    At her visit with Dr. Alston on 8/4/2022, she endorsed ongoing exertional chest discomfort. EKG in clinic showed no ischemic changes. She was referred for coronary angiography as well as echocardiogram. She was started on aspirin 81 mg daily, carvedilol 3.125 mg BID, and rosuvastatin 20 mg daily.     Follow-up echocardiogram showed preserved LV function with an EF of 55 to 60%.  Regional wall motion abnormalities cannot be excluded due to limited visualization.  Subsequent coronary angiogram on 8/11/2022 showed mild nonobstructive coronary disease.      Most recent labs, which I reviewed, show very high LDL of 175, normal HDL of 70, triglycerides of 74, total cholesterol 260.  Her renal function and electrolytes are normal. CBC is also normal.     Today the patient is here for follow-up.  We discussed findings from both her coronary angiogram and echocardiogram in detail.  She reports  doing well from a cardiovascular standpoint with no recurrent chest discomfort.  She denies any dyspnea on exertion, syncope or near syncope, or palpitations.  Her right radial site is healed well.  She does, however, endorse ongoing intermittent nausea and muscle aches since the initiation of her new medications.     Her blood pressure looks excellent today at 122/70.  Her weight is stable.           Assessment and Plan:     ASSESSMENT:    1. Abnormal stress test.  Subsequent coronary angiogram showed normal coronary anatomy.  2. New-onset angina.  No recurrent chest discomfort since the initiation of medication.  3. Hyperlipidemia with goal LDL < 70. , on rosuvastatin 20 gm daily.  4. Family hx of premature CAD       PLAN:     1. Okay to stop aspirin today as patient has no evidence of coronary artery disease and she feels this may be contributing to her nausea.  2. Given her hypertensive response to exercise noted during her stress test that was associated with her chest discomfort, will keep patient on carvedilol 3.125 mg twice daily.  3. We discussed transitioning her to atorvastatin given muscle aches, though, patient would like to remain on rosuvastatin for now and closely monitor her symptoms.  If symptoms persist or worsen, plan to transition her to atorvastatin 40 mg daily.  4. Assuming she remains clinically stable, follow-up with Dr. Alston in 6 months with repeat lipid panel and BMP.         Jody Caballero DNP, APRN, CNP  Page: [phone] (8a-5p M-F)    Orders this Visit:  Orders Placed This Encounter   Procedures     Follow-Up with Cardiology     No orders of the defined types were placed in this encounter.    Medications Discontinued During This Encounter   Medication Reason     aspirin (ASA) 81 MG EC tablet        Today's clinic visit entailed:  Review of the result(s) of each unique test - echo, coronary angiogram, labs  Ordering of each unique test  Prescription drug management  35 minutes  "spent on the date of the encounter doing chart review, review of test results, patient visit and documentation   Provider  Link to [facility] Help Grid     The level of medical decision making during this visit was of moderate complexity.           Review of Systems:     Review of Systems:  Skin:  not assessed     Eyes:  not assessed    ENT:  not assessed    Respiratory:  Negative    Cardiovascular:    fatigue;Positive for  Gastroenterology: not assessed    Genitourinary:  not assessed    Musculoskeletal:  Positive for    Neurologic:  not assessed    Psychiatric:  not assessed    Heme/Lymph/Imm:  not assessed    Endocrine:  not assessed              Physical Exam:   Vitals: /70   Pulse 75   Ht 1.676 m (5' 6\")   Wt 66.4 kg (146 lb 4.8 oz)   SpO2 98%   BMI 23.61 kg/m    Constitutional:  well developed        Skin:  warm and dry to the touch        Head:  normocephalic        Eyes:  pupils equal and round        ENT:  not assessed this visit        Neck:  JVP normal        Chest:  clear to auscultation        Cardiac: regular rhythm;normal S1 and S2;no murmurs, gallops or rubs detected                  Abdomen:  abdomen soft        Vascular: pulses full and equal                                 R radial site intact    Extremities and Back:  no edema        Neurological:  no gross motor deficits;affect appropriate             Medications:     Current Outpatient Medications   Medication Sig Dispense Refill     CALCIUM-VITAMIN D PO Take 1 tablet by mouth 2 times daily       carvedilol (COREG) 3.125 MG tablet Take 1 tablet (3.125 mg) by mouth 2 times daily (with meals) 60 tablet 4     cetirizine (ZYRTEC) 10 MG tablet Take 10 mg by mouth daily       nitroGLYcerin (NITROSTAT) 0.4 MG sublingual tablet For chest pain place 1 tablet under the tongue every 5 minutes for 3 doses. If symptoms persist 5 minutes after 1st dose call 911. 15 tablet 4     rosuvastatin (CRESTOR) 20 MG tablet Take 1 tablet (20 mg) by mouth daily 30 " tablet 4     triamcinolone (KENALOG) 0.1 % external cream Apply topically 2 times daily 45 g 0       Family History   Problem Relation Age of Onset     Osteoporosis Mother      Dementia Mother      Hyperlipidemia Mother      Hypertension Father      Cerebrovascular Disease Father      Coronary Artery Disease Father 30     Coronary Artery Disease Brother      No Known Problems Maternal Grandmother      No Known Problems Maternal Grandfather      Myocardial Infarction Paternal Grandmother      Obesity Paternal Grandmother        Social History     Socioeconomic History     Marital status:      Spouse name: Not on file     Number of children: Not on file     Years of education: Not on file     Highest education level: Not on file   Occupational History     Occupation: Retired .   Tobacco Use     Smoking status: Never Smoker     Smokeless tobacco: Never Used   Vaping Use     Vaping Use: Never used   Substance and Sexual Activity     Alcohol use: Yes     Comment: 2-3 drinks/month, socially     Drug use: Never     Sexual activity: Yes     Partners: Male     Birth control/protection: None   Other Topics Concern     Parent/sibling w/ CABG, MI or angioplasty before 65F 55M? 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 One daughter, one dog.     Social Determinants of Health     Financial Resource Strain: Low Risk      Difficulty of Paying Living Expenses: Not hard at all   Food Insecurity: No Food Insecurity     Worried About Running Out of Food in the Last Year: Never true     Ran Out of Food in the Last Year: Never true   Transportation Needs: No Transportation Needs     Lack of Transportation (Medical): No     Lack of Transportation (Non-Medical): No   Physical Activity: Insufficiently Active     Days of Exercise per Week: 3 days     Minutes of Exercise per Session: 30 min   Stress: No Stress Concern Present      Feeling of Stress : Not at all   Social Connections: Moderately Integrated     Frequency of Communication with Friends and Family: More than three times a week     Frequency of Social Gatherings with Friends and Family: Once a week     Attends Mor[employer] Services: 1 to 4 times per year     Active Member of Clubs or Organizations: No     Attends Club or Organization Meetings: Not on file     Marital Status:    Intimate Partner Violence: Not on file   Housing Stability: Low Risk      Unable to Pay for Housing in the Last Year: No     Number of Places Lived in the Last Year: 1     Unstable Housing in the Last Year: No            Past Medical History:     Past Medical History:   Diagnosis Date     Abnormal cardiovascular stress test 8/2/2022     Depression, prolonged     Cyclical related to 's health     Hyperlipidemia     One high reading late Fall 2016     Osteopenia               Past Surgical History:     Past Surgical History:   Procedure Laterality Date     CV CORONARY ANGIOGRAM N/A 8/11/2022    Procedure: Coronary Angiogram;  Surgeon: Diana Smith MD;  Location: [facility] CARDIAC CATH LAB     [facility] SHOULDER G/E 2 VIEWS  01/01/1999 1997, 1999 - decompression, clavicle resection              Allergies:   Iodine and Latex       Data:   All laboratory data reviewed:    Recent Labs   Lab Test 07/27/22  0903 12/16/16  1529 08/16/16  0913 07/21/14  0000   *  --  175*  --    HDL 70  --  52  --    NHDL 190*  --  194*  --    CHOL 260*  --  246*  --    TRIG 74  --  97  --    TSH  --  3.01  --  2.28       Lab Results   Component Value Date    WBC 4.9 08/11/2022    WBC 5.3 03/29/2021    RBC 4.43 08/11/2022    RBC 4.31 03/29/2021    HGB 13.8 08/11/2022    HGB 14.1 03/29/2021    HCT 42.4 08/11/2022    HCT 41.9 03/29/2021    MCV 96 08/11/2022    MCV 97 03/29/2021    MCH 31.2 08/11/2022    MCH 32.7 03/29/2021    MCHC 32.5 08/11/2022    MCHC 33.7 03/29/2021    RDW 11.9 08/11/2022    RDW 12.2 03/29/2021      08/11/2022     03/29/2021       Lab Results   Component Value Date     08/11/2022     03/29/2021    POTASSIUM 4.2 08/11/2022    POTASSIUM 4.0 03/29/2021    CHLORIDE 107 08/11/2022    CHLORIDE 105 03/29/2021    CO2 29 08/11/2022    CO2 27 03/29/2021    ANIONGAP 3 08/11/2022    ANIONGAP 7 03/29/2021    GLC 98 08/11/2022    GLC 76 03/29/2021    BUN 19 08/11/2022    BUN 15 03/29/2021    CR 0.82 08/11/2022    CR 0.76 03/29/2021    GFRESTIMATED 80 08/11/2022    GFRESTIMATED 84 03/29/2021    GFRESTBLACK >90 03/29/2021    SUNSHINE 9.4 08/11/2022    SUNSHINE 9.2 03/29/2021      Lab Results   Component Value Date    AST 20 08/02/2022    AST 16 03/29/2021    ALT 21 08/02/2022    ALT 24 03/29/2021       No results found for: A1C    Lab Results   Component Value Date    INR 1.03 08/11/2022         Thank you for allowing me to participate in the care of your patient.      Sincerely,     Jody Caballero CNP     [facility]  cc:   No referring provider defined for this encounter.

## 2022-09-08 NOTE — PROGRESS NOTES
Cardiology Clinic Progress Note  Julia Key MRN# [id number]   YOB: 1960 Age: 61 year old     Primary cardiologist: Dr. Alston     Reason for visit: s/p coronary angiogram     History of presenting illness:    Julia Key is an extremely pleasant and young 61 year old female with no significant past medical history who was recently seen in cardiology consultation following abnormal stress test that was ordered by her PCP for ongoing exertional chest discomfort.  Her exercise stress test was positive for inducible ischemia.  She exercised for 7 minutes, developed chest pain that persisted into recovery associated with ischemic EKG changes in the inferior leads.  She had a hypertensive response to exercise with a resting blood pressure 130/78 and a peak blood pressure of 192/83 associated with chest pain.    At her visit with Dr. Alston on 8/4/2022, she endorsed ongoing exertional chest discomfort. EKG in clinic showed no ischemic changes. She was referred for coronary angiography as well as echocardiogram. She was started on aspirin 81 mg daily, carvedilol 3.125 mg BID, and rosuvastatin 20 mg daily.     Follow-up echocardiogram showed preserved LV function with an EF of 55 to 60%.  Regional wall motion abnormalities cannot be excluded due to limited visualization.  Subsequent coronary angiogram on 8/11/2022 showed mild nonobstructive coronary disease.      Most recent labs, which I reviewed, show very high LDL of 175, normal HDL of 70, triglycerides of 74, total cholesterol 260.  Her renal function and electrolytes are normal. CBC is also normal.     Today the patient is here for follow-up.  We discussed findings from both her coronary angiogram and echocardiogram in detail.  She reports doing well from a cardiovascular standpoint with no recurrent chest discomfort.  She denies any dyspnea on exertion, syncope or near syncope, or palpitations.  Her right radial site is healed well.  She does,  however, endorse ongoing intermittent nausea and muscle aches since the initiation of her new medications.     Her blood pressure looks excellent today at 122/70.  Her weight is stable.           Assessment and Plan:     ASSESSMENT:    1. Abnormal stress test.  Subsequent coronary angiogram showed normal coronary anatomy.  2. New-onset angina.  No recurrent chest discomfort since the initiation of medication.  3. Hyperlipidemia with goal LDL < 70. , on rosuvastatin 20 gm daily.  4. Family hx of premature CAD       PLAN:     1. Okay to stop aspirin today as patient has no evidence of coronary artery disease and she feels this may be contributing to her nausea.  2. Given her hypertensive response to exercise noted during her stress test that was associated with her chest discomfort, will keep patient on carvedilol 3.125 mg twice daily.  3. We discussed transitioning her to atorvastatin given muscle aches, though, patient would like to remain on rosuvastatin for now and closely monitor her symptoms.  If symptoms persist or worsen, plan to transition her to atorvastatin 40 mg daily.  4. Assuming she remains clinically stable, follow-up with Dr. Alston in 6 months with repeat lipid panel and BMP.         Jody Caballero, DNP, APRN, CNP  Page: [phone] (8a-5p M-F)    Orders this Visit:  Orders Placed This Encounter   Procedures     Follow-Up with Cardiology     No orders of the defined types were placed in this encounter.    Medications Discontinued During This Encounter   Medication Reason     aspirin (ASA) 81 MG EC tablet        Today's clinic visit entailed:  Review of the result(s) of each unique test - echo, coronary angiogram, labs  Ordering of each unique test  Prescription drug management  35 minutes spent on the date of the encounter doing chart review, review of test results, patient visit and documentation   Provider  Link to [facility] Help Grid     The level of medical decision making during this visit was  "of moderate complexity.           Review of Systems:     Review of Systems:  Skin:  not assessed     Eyes:  not assessed    ENT:  not assessed    Respiratory:  Negative    Cardiovascular:    fatigue;Positive for  Gastroenterology: not assessed    Genitourinary:  not assessed    Musculoskeletal:  Positive for    Neurologic:  not assessed    Psychiatric:  not assessed    Heme/Lymph/Imm:  not assessed    Endocrine:  not assessed              Physical Exam:   Vitals: /70   Pulse 75   Ht 1.676 m (5' 6\")   Wt 66.4 kg (146 lb 4.8 oz)   SpO2 98%   BMI 23.61 kg/m    Constitutional:  well developed        Skin:  warm and dry to the touch        Head:  normocephalic        Eyes:  pupils equal and round        ENT:  not assessed this visit        Neck:  JVP normal        Chest:  clear to auscultation        Cardiac: regular rhythm;normal S1 and S2;no murmurs, gallops or rubs detected                  Abdomen:  abdomen soft        Vascular: pulses full and equal                                 R radial site intact    Extremities and Back:  no edema        Neurological:  no gross motor deficits;affect appropriate             Medications:     Current Outpatient Medications   Medication Sig Dispense Refill     CALCIUM-VITAMIN D PO Take 1 tablet by mouth 2 times daily       carvedilol (COREG) 3.125 MG tablet Take 1 tablet (3.125 mg) by mouth 2 times daily (with meals) 60 tablet 4     cetirizine (ZYRTEC) 10 MG tablet Take 10 mg by mouth daily       nitroGLYcerin (NITROSTAT) 0.4 MG sublingual tablet For chest pain place 1 tablet under the tongue every 5 minutes for 3 doses. If symptoms persist 5 minutes after 1st dose call 911. 15 tablet 4     rosuvastatin (CRESTOR) 20 MG tablet Take 1 tablet (20 mg) by mouth daily 30 tablet 4     triamcinolone (KENALOG) 0.1 % external cream Apply topically 2 times daily 45 g 0       Family History   Problem Relation Age of Onset     Osteoporosis Mother      Dementia Mother      " Hyperlipidemia Mother      Hypertension Father      Cerebrovascular Disease Father      Coronary Artery Disease Father 30     Coronary Artery Disease Brother      No Known Problems Maternal Grandmother      No Known Problems Maternal Grandfather      Myocardial Infarction Paternal Grandmother      Obesity Paternal Grandmother        Social History     Socioeconomic History     Marital status:      Spouse name: Not on file     Number of children: Not on file     Years of education: Not on file     Highest education level: Not on file   Occupational History     Occupation: Retired .   Tobacco Use     Smoking status: Never Smoker     Smokeless tobacco: Never Used   Vaping Use     Vaping Use: Never used   Substance and Sexual Activity     Alcohol use: Yes     Comment: 2-3 drinks/month, socially     Drug use: Never     Sexual activity: Yes     Partners: Male     Birth control/protection: None   Other Topics Concern     Parent/sibling w/ CABG, MI or angioplasty before 65F 55M? 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     Social Determinants of Health     Financial Resource Strain: Low Risk      Difficulty of Paying Living Expenses: Not hard at all   Food Insecurity: No Food Insecurity     Worried About Running Out of Food in the Last Year: Never true     Ran Out of Food in the Last Year: Never true   Transportation Needs: No Transportation Needs     Lack of Transportation (Medical): No     Lack of Transportation (Non-Medical): No   Physical Activity: Insufficiently Active     Days of Exercise per Week: 3 days     Minutes of Exercise per Session: 30 min   Stress: No Stress Concern Present     Feeling of Stress : Not at all   Social Connections: Moderately Integrated     Frequency of Communication with Friends and Family: More than three times a week     Frequency of Social Gatherings  with Friends and Family: Once a week     Attends Zoroastrianism Services: 1 to 4 times per year     Active Member of Clubs or Organizations: No     Attends Club or Organization Meetings: Not on file     Marital Status:    Intimate Partner Violence: Not on file   Housing Stability: Low Risk      Unable to Pay for Housing in the Last Year: No     Number of Places Lived in the Last Year: 1     Unstable Housing in the Last Year: No            Past Medical History:     Past Medical History:   Diagnosis Date     Abnormal cardiovascular stress test 8/2/2022     Depression, prolonged     Cyclical related to 's health     Hyperlipidemia     One high reading late Fall 2016     Osteopenia               Past Surgical History:     Past Surgical History:   Procedure Laterality Date     CV CORONARY ANGIOGRAM N/A 8/11/2022    Procedure: Coronary Angiogram;  Surgeon: Diana Smith MD;  Location: [facility] CARDIAC CATH LAB     [facility] SHOULDER G/E 2 VIEWS  01/01/1999 1997, 1999 - decompression, clavicle resection              Allergies:   Iodine and Latex       Data:   All laboratory data reviewed:    Recent Labs   Lab Test 07/27/22  0903 12/16/16  1529 08/16/16  0913 07/21/14  0000   *  --  175*  --    HDL 70  --  52  --    NHDL 190*  --  194*  --    CHOL 260*  --  246*  --    TRIG 74  --  97  --    TSH  --  3.01  --  2.28       Lab Results   Component Value Date    WBC 4.9 08/11/2022    WBC 5.3 03/29/2021    RBC 4.43 08/11/2022    RBC 4.31 03/29/2021    HGB 13.8 08/11/2022    HGB 14.1 03/29/2021    HCT 42.4 08/11/2022    HCT 41.9 03/29/2021    MCV 96 08/11/2022    MCV 97 03/29/2021    MCH 31.2 08/11/2022    MCH 32.7 03/29/2021    MCHC 32.5 08/11/2022    MCHC 33.7 03/29/2021    RDW 11.9 08/11/2022    RDW 12.2 03/29/2021     08/11/2022     03/29/2021       Lab Results   Component Value Date     08/11/2022     03/29/2021    POTASSIUM 4.2 08/11/2022    POTASSIUM 4.0 03/29/2021    CHLORIDE  107 08/11/2022    CHLORIDE 105 03/29/2021    CO2 29 08/11/2022    CO2 27 03/29/2021    ANIONGAP 3 08/11/2022    ANIONGAP 7 03/29/2021    GLC 98 08/11/2022    GLC 76 03/29/2021    BUN 19 08/11/2022    BUN 15 03/29/2021    CR 0.82 08/11/2022    CR 0.76 03/29/2021    GFRESTIMATED 80 08/11/2022    GFRESTIMATED 84 03/29/2021    GFRESTBLACK >90 03/29/2021    SUNSHINE 9.4 08/11/2022    SUNSHINE 9.2 03/29/2021      Lab Results   Component Value Date    AST 20 08/02/2022    AST 16 03/29/2021    ALT 21 08/02/2022    ALT 24 03/29/2021       No results found for: A1C    Lab Results   Component Value Date    INR 1.03 08/11/2022

## 2022-10-23 ENCOUNTER — HEALTH MAINTENANCE LETTER (OUTPATIENT)
Age: 62
End: 2022-10-23

## 2022-11-30 DIAGNOSIS — R07.9 CHEST PAIN ON EXERTION: ICD-10-CM

## 2022-11-30 RX ORDER — CARVEDILOL 3.12 MG/1
3.12 TABLET ORAL 2 TIMES DAILY WITH MEALS
Qty: 180 TABLET | Refills: 0 | Status: SHIPPED | OUTPATIENT
Start: 2022-11-30 | End: 2023-02-24

## 2022-12-12 ENCOUNTER — VIRTUAL VISIT (OUTPATIENT)
Dept: ENDOCRINOLOGY | Facility: CLINIC | Age: 62
End: 2022-12-12
Attending: NURSE PRACTITIONER
Payer: COMMERCIAL

## 2022-12-12 DIAGNOSIS — M81.0 AGE-RELATED OSTEOPOROSIS WITHOUT CURRENT PATHOLOGICAL FRACTURE: ICD-10-CM

## 2022-12-12 PROCEDURE — 99204 OFFICE O/P NEW MOD 45 MIN: CPT | Mod: GT | Performed by: INTERNAL MEDICINE

## 2022-12-12 RX ORDER — ALENDRONATE SODIUM 70 MG/1
70 TABLET ORAL
Qty: 12 TABLET | Refills: 3 | Status: SHIPPED | OUTPATIENT
Start: 2022-12-12 | End: 2023-01-18

## 2022-12-12 NOTE — PATIENT INSTRUCTIONS
[facility]  Dr Rose, Endocrinology Department    [facility]   [address] [id number]  [place] [address]  Appointment [phone]  Fax: [phone]  [place]: Monday - Thursday      Labs needed.  If labs are in range- Recommend to start FOSAMAX 70 mg once a week.  DEXA in 1-2 years.  Follow up in 1 year.    Instructions on Fosamax use and side effects - particularly esophageal adverse events - are carefully reviewed with her. This drug must be taken upon arising for the day on an empty stomach, with a large 6-8 ounce glass of water; she must remain NPO in the upright position for at least 30 minutes afterwards and until after the first food of the day. If esophageal irritation is noted, she will stop the drug and call my office.  Treatment with bisphosphonate therapy will decrease fracture risk 50-70%.   There is risk of osteonecrosis of the jaw in patients using bisphosphonates is approximately 1700-1/100,000, with development most likely related to invasive dental procedures. If an invasive dental procedure was necessary, preferably stop the bisphosphonate approximately 3 months prior to reduce the risk. Let your dentist know that you are on this medication.  The data available at this time suggests that there is probably a small increase risk of atypical (nontraumatic) subtrochanteric fractures of the femur in patients on bisphosphonate therapy compared to those not on it. One large study suggested that for every 100 fractures prevented with bisphosphonate therapy, less than one femur fracture will occur. Other studies suggest one episode per 2,500 patient years. Patient should call with leg pain.      The pt was advised to  Maintain an adequate calcium and vitamin D intake and to supplement vitamin D if needed to maintain serum levels of 25 hydroxy D (25 OH D) between 30-60 ng/ml.  Limit alcohol intake to no more than 2 servings per day.  Limit  caffeine intake.  Maintain an active lifestyle including weight-bearing exercises for at least 30 mins daily.  Take measures to reduce the risk of falling.    You should get 1000- 1200 mg/day calcium in divided doses of no more than 500 mg/dose.  This INCLUDES what is in your food as well as what is in any supplements you may be taking.    Vit D about 800-1000 IU/day ( unless you have vit D deficiency- in that case higher dose)  Dietary sources of calcium:: These also contain vitamin D  Milk                            8 oz            300 mg calcium  Yogurt                          1 cup           400 mg calcium   Hard cheese                     1.5 oz          300 mg  Cottage cheese                  2 cup           300 mg  Orange juice with Calcium       8 oz            300 mg  Low fat dairy sources are recommended      You should get 30 minutes of moderate weight bearing exercise on most days of the week .  Weight bearing exercise includes such things as walking, jogging, hiking, dancing.  You should also get Strength training 2 or more times/week in addition to other weight -being exercise. Strength training uses weight or resistance beyond that seen in everyday activities -(pilates, weight training with free weights, weight machines or resistance bands)    Living with Osteoporosis: Preventing Fractures  If you have osteoporosis, you can do a lot to reduce its effect on your life. Knowing how to prevent fractures and spinal curvature can help you live more comfortably and safely with this disease.  Reducing your risk for fractures  The most common fracture sites in people with osteoporosis are the wrist, spine, and hip. These fractures are often caused by accidents and falls. All fractures are painful and may limit what you can do. But hip fractures are very serious. They often need surgery, and it can take months to recover. To reduce your risk for fractures:  Get regular exercise. Try walking, swimming, or  weight training.  Eat foods that are rich in calcium, or take calcium supplements.  Make your home safe to help avoid accidents.  Take extra precautions not to fall in risky areas, such as icy sidewalks.  Understanding spinal fractures  Your spine is made up of many bones called vertebrae. Osteoporosis can cause the vertebrae in your spine to collapse. As a result, your upper back may arch forward, creating a curvature. Spine fractures may also result from back strain and bad posture. You will also lose height. Your lower spine must then adjust to keep your body balanced. This can cause back pain. To prevent or lessen these spinal changes:  Practice good posture.  Use proper techniques if you need to lift heavy objects.  Do back exercises to help your posture.  Lie on your back when you have pain.  Ask your healthcare provider about these and other ways to help your spine.  [employer] last reviewed this educational content on 5/1/2018 2000-2021 [employer]. All rights reserved. This information is not intended as a substitute for professional medical care. Always follow your healthcare professional's instructions.          Living with Osteoporosis: Regular Exercise  If you have osteoporosis, exercise is vital for your health. It can prevent bone fractures and spine changes. It will slow bone loss. Exercise will strengthen your body. It can also be fun. A variety of exercises is best. See below for exercises that can help you. But before you start, talk with your healthcare provider to be sure these exercises are right for you.   Resistance exercises. These build muscle strength and maintain bone mass. They also make you less prone to injury. Exercises include lifting small weights, doing push-ups and sit-ups, using elastic exercise bands, and using weight machines.   Weight-bearing activities. These help your whole body. They also help you maintain bone mass. Activities include walking, dancing, and  housework.   Non-weight-bearing exercises. These help prevent back strain and pain. They do this by building the trunk and leg muscles. Exercises that help with flexibility can prevent falls. Examples include swimming, water exercise, and stretching.   Staying safe  Here are tips to stay safe:   Always check with your healthcare provider before starting any new exercise program.  Use weights only as instructed.  Stop any exercise that causes pain.  [employer] last reviewed this educational content on 5/1/2018 2000-2021 [employer]. All rights reserved. This information is not intended as a substitute for professional medical care. Always follow your healthcare professional's instructions.          Preventing Osteoporosis: Avoiding Bone Loss  Certain factors can speed up bone loss or decrease bone growth. For example, alcohol, cigarettes, and certain medicines reduce bone mass. Some foods make it hard for your body to absorb calcium.  Things to avoid  Here are things to avoid to help prevent osteoporosis:  Alcohol. This is toxic to bones. It is a major cause of bone loss. Heavy drinking can cause osteoporosis even if you have no other risk factors.  Smoking. This reduces bone mass. Smoking may also interfere with estrogen levels and cause early menopause.  Inactivity. Not being active makes your bones lose strength and become thinner. Over time, thin bones may break. Women who aren't active are at a high risk for osteoporosis.  Certain medicines. Some medicines, such as cortisone, increase bone loss. They also decrease bone growth. Ask your healthcare provider about any side effects of your medicines, and how to prevent them.  Protein-rich or salty foods. Eaten in large amounts, these foods may deplete calcium.  Caffeine. This increases calcium loss. People who drink a lot of coffee, tea, or soda lose more calcium than those who don't.  [employer] last reviewed this educational content on 5/1/2018     6763-4062 [employer]. All rights reserved. This information is not intended as a substitute for professional medical care. Always follow your healthcare professional's instructions.          Preventing Osteoporosis: Meeting Your Calcium Needs  Your body needs calcium to build and repair bones. But it can't make calcium on its own. That's why it's important to eat calcium-rich foods. Some foods are naturally rich in calcium. Others have calcium added (fortified). It's best to get calcium from the foods you eat. But if you can't get enough, you may want to take calcium supplements. To meet your daily calcium needs, try the foods listed below.               Dairy Fish & beans Other sources   Source   Calcium (mg) per serving   Source   Calcium (mg) per serving   Source   Calcium (mg) per serving   Low-fat yogurt, plain   415 mg/8 oz.   Sardines, Atlantic, canned, with bones   351 mg/3 oz.   Oatmeal, instant, fortified   215 mg/1 cup   Nonfat milk   302 mg/1 cup   [place], sockeye, canned, with bones   239 mg/3 oz.   Tofu made with calcium sulfate   204 mg/3 oz.   Low-fat milk   297 mg/1 cup   Soybeans, fresh, boiled   131 mg/1/2 cup   Collards   179 mg/1/2 cup   Swiss cheese   272 mg/1 oz.   White beans, cooked   81 mg/1/2 cup   English muffin, whole wheat   175 mg/1 muffin   Cheddar cheese   205 mg/1 oz.   Navy beans, cooked   79 mg/1/2 cup   Kale   90 mg/1/2 cup   Ice cream strawberry   79 mg/1/2 cup           Orange, navel   56 mg/1 medium   Note: Calcium levels may vary depending on brand and size.  Daily calcium needs  14 to 18 years old: 1,300 mg  19 to 30 years old: 1,000 mg  31 to 50 years old: 1,000 mg  51 to 70 years old, women: 1,200 mg  51 to 70 years old, men: 1,000 mg  Pregnant or nursin to 18 years old: 1,300 mg, 19 to 50 years old: 1,000 mg  Older than 70 (women and men): 1,200 mg   Josefina last reviewed this educational content on 2018-2021 [employer]. All  rights reserved. This information is not intended as a substitute for professional medical care. Always follow your healthcare professional's instructions.

## 2022-12-12 NOTE — Clinical Note
"    12/12/2022         RE: Julia Key  [address] [place] [address]        Dear Colleague,    Thank you for referring your patient, Julia Key, to the [facility]. Please see a copy of my visit note below.    Julia is a 62 year old who is being evaluated via a billable video visit.      How would you like to obtain your AVS? MyChart  If the video visit is dropped, the invitation should be resent by: Send to e-mail at: [email or web address]  Will anyone else be joining your video visit? No  {If patient encounters technical issues they should call [phone] :[id number]}      Video-Visit Details    Video Start Time: {video visit start/end time for provider to select:[id number]}    Type of service:  Video Visit    Video End Time:{video visit start/end time for provider to select:[id number]}    Originating Location (pt. Location): {video visit patient location:836632::\"Home\"}    {PROVIDER LOCATION On-site should be selected for visits conducted from your clinic location or adjoining [facility] hospital, academic office, or other nearby [facility] building. Off-site should be selected for all other provider locations, including home:[id number]}    Distant Location (provider location):  {virtual location provider:670882}    Platform used for Video Visit: {Virtual Visit Platforms:697996::\"AmWell\"}    THIS IS A VIDEO VISIT:    Phone call visit/virtual visit encounter:    Name of patient: Julia Key    Date of encounter: 12/12/2022    Time of start of video visit: 2:30    Video started: 2:42    Video ended: 3:02    Provider location: working from home/ [facility]    Patient location: patients home.    Mode of transmission: AMWELL video/ Doximity    Verbal consent: obtained before starting visit. Pt is agreeable.      The patient has been notified of following:      \"This VIDEO visit will be conducted via a call between you and your physician/provider. We have found that certain health care needs can " "be provided without the need for a physical exam.  This service lets us provide the care you need with a short phone conversation.  If a prescription is necessary we can send it directly to your pharmacy.  If lab work is needed we can place an order for that and you can then stop by our lab to have the test done at a later time.     With new updates with corona virus patient might be billed as clinic visit.     If during the course of the call the physician/provider feels a telephone visit is not appropriate, you will not be charged for this service.\"      Past medical history, social history, family history, allergy and medications were reviewed and updated as appropriate.  Reviewed pertinent labs, notes, imaging studies personally.      Name: Julia Key  Date: 12/12/2022  Seen in consultation with Shannon Arciniega for osteoporosis.     HPI:  Julia Key is a 62 year old female who presents for the evaluation of osteoporosis.   has a past medical history of Abnormal cardiovascular stress test (8/2/2022), Depression, prolonged, Hyperlipidemia, and Osteopenia.    She was diagnosed with osteopenia few years back and then osteoporosis with DEXA 7/2022.    DEXA 2015: Osteopenia.    DEXA 7/2022:Osteoporosis. There has been probable significant decrease in bone density of the lumbar spine and of the hip(s).     RISK FACTORS:  Post-menopausal, Parent history of osteoporosis, Parent history of a hip fracture, follow up osteopenia.    GERD: yes- under control with diet only. Not on medication.    Dental health: normal. No major dental procedures planned.    Kidney function: within normal limits    Previous treatment for osteopenia/osteoporosis: previous Fosamax for one month in 2013 (she had dizziness on it and GI problems)    Current treatment for Osteopenia/Osteoporosis: Calcium, Vitamin D    Smoke:No  Family History:Yes: mother with osteoporosis  Menstrual history/Birthing: s/p menopause. Took HRT for about 6 years. " Last use was May 2020.  Fractures:No  Kidney stones: No  GI Surgery:No  Duration of therapy:  As noted above  Exercise:Yes: 4-5 times a week- 30-60 min each time.  Diet:  0-1 servings of dairy/day  Ca/Vitamin D: 1200 mg of calcium/day, 2000 international unit(s) of vit D/day  Alcohol:  No  Eating Disorder: No  Steroid Use:  No  PMH/PSH:  Past Medical History:   Diagnosis Date     Abnormal cardiovascular stress test 8/2/2022     Depression, prolonged     Cyclical related to 's health     Hyperlipidemia     One high reading late Fall 2016     Osteopenia      Past Surgical History:   Procedure Laterality Date     CV CORONARY ANGIOGRAM N/A 8/11/2022    Procedure: Coronary Angiogram;  Surgeon: Diana Smith MD;  Location:  HEART CARDIAC CATH LAB     [facility] SHOULDER G/E 2 VIEWS  01/01/1999 1997, 1999 - decompression, clavicle resection     Family Hx:  Family History   Problem Relation Age of Onset     Osteoporosis Mother      Dementia Mother      Hyperlipidemia Mother      Hypertension Father      Cerebrovascular Disease Father      Coronary Artery Disease Father 30     Coronary Artery Disease Brother      No Known Problems Maternal Grandmother      No Known Problems Maternal Grandfather      Myocardial Infarction Paternal Grandmother      Obesity Paternal Grandmother        Social Hx:  Social History     Socioeconomic History     Marital status:      Spouse name: Not on file     Number of children: Not on file     Years of education: Not on file     Highest education level: Not on file   Occupational History     Occupation: Retired .   Tobacco Use     Smoking status: Never     Smokeless tobacco: Never   Vaping Use     Vaping Use: Never used   Substance and Sexual Activity     Alcohol use: Yes     Comment: 2-3 drinks/month, socially     Drug use: Never     Sexual activity: Yes     Partners: Male     Birth control/protection: None   Other Topics Concern     Parent/sibling  w/ CABG, MI or angioplasty before 65F 55M? Not Asked   Social History Narrative    Retired in June 2020, was working as a [employer].   since 1989. One daughter, one dog.     Social Determinants of Health     Financial Resource Strain: Low Risk      Difficulty of Paying Living Expenses: Not hard at all   Food Insecurity: No Food Insecurity     Worried About Running Out of Food in the Last Year: Never true     Ran Out of Food in the Last Year: Never true   Transportation Needs: No Transportation Needs     Lack of Transportation (Medical): No     Lack of Transportation (Non-Medical): No   Physical Activity: Insufficiently Active     Days of Exercise per Week: 3 days     Minutes of Exercise per Session: 30 min   Stress: No Stress Concern Present     Feeling of Stress : Not at all   Social Connections: Moderately Integrated     Frequency of Communication with Friends and Family: More than three times a week     Frequency of Social Gatherings with Friends and Family: Once a week     Attends Hindu Services: 1 to 4 times per year     Active Member of Clubs or Organizations: No     Attends Club or Organization Meetings: Not on file     Marital Status:    Intimate Partner Violence: Not on file   Housing Stability: Low Risk      Unable to Pay for Housing in the Last Year: No     Number of Places Lived in the Last Year: 1     Unstable Housing in the Last Year: No          MEDICATIONS:  has a current medication list which includes the following prescription(s): calcium-vitamin d, carvedilol, cetirizine, nitroglycerin, rosuvastatin, and triamcinolone.    ROS     ROS: 10 point ROS neg other than the symptoms noted above in the HPI.    Physical Exam   VS: There were no vitals taken for this visit.  GENERAL: healthy, alert and no distress  EYES: Eyes grossly normal to inspection, conjunctivae and sclerae normal  ENT: no nose swelling, nasal discharge.  Thyroid: no apparent thyroid nodules  RESP: no  audible wheeze, cough, or visible cyanosis.  No visible retractions or increased work of breathing.  Able to speak fully in complete sentences.  ABDO: not evaluated.  EXTREMITIES: no hand tremors.  NEURO: Cranial nerves grossly intact, mentation intact and speech normal  SKIN: No apparent skin lesions, rash or edema seen   PSYCH: mentation appears normal, affect normal/bright, judgement and insight intact, normal speech and appearance well-groomed      LABS:  Calcium:  ENDO CALCIUM LABS-[facility] Latest Ref Rng & Units 8/11/2022   CALCIUM 8.5 - 10.1 mg/dL 9.4     Creatinine:  Creatinine   Date Value Ref Range Status   08/11/2022 0.82 0.52 - 1.04 mg/dL Final   03/29/2021 0.76 0.52 - 1.04 mg/dL Final       TFTs:  Lab Results   Component Value Date    TSH 3.01 12/16/2016       PTH:  Vitamin D:  Vitamin D Deficiency Screening Results:  No results found for: VITDT    BoneTurnOverMarkers:    DEXA:      All pertinent notes, labs, and images personally reviewed by me.     A/P  Ms.Mary ERIKA Key is a 62 year old here for the evaluation of:    (M81.0) Age-related osteoporosis without current pathological fracture  Comment: Risk factors for low bone density include personal history of fracture or family history, , female, Estrogen deficiency.   DEXA 7/2022:Osteoporosis. There has been probable significant decrease in bone density of the lumbar spine and of the hip(s).   Normal calcium, creatinine.  No recent vit D to review.  Plan:   Discussed diagnosis, pathophysiology, management and treatment options of condition with pt.  Labs needed.  If labs (vit D) are in range- Recommend to start FOSAMAX 70 mg once a week.  DEXA in 1-2 years.  Follow up in 1 year.    Plan: TSH with free T4 reflex, Calcium, Creatinine,         Vitamin D Deficiency, alendronate (FOSAMAX) 70         MG tablet         Discussed indications, risks and benefits of all medications prescribed, and answered questions to patient's satisfaction.    The pt was  advised to    Maintain an adequate calcium and vitamin D intake and to supplement vitamin D if needed to maintain serum levels of 25 hydroxy D (25 OH D) between 30-60 ng/ml.    Limit alcohol intake to no more than 2 servings per day.    Limit caffeine intake.    Maintain an active lifestyle including weight-bearing exercises for at least 30 mins daily.    Take measures to reduce the risk of falling.    Instructions on Fosamax use and side effects - particularly esophageal adverse events - are carefully reviewed with her. This drug must be taken upon arising for the day on an empty stomach, with a large 6-8 ounce glass of water; she must remain NPO in the upright position for at least 30 minutes afterwards and until after the first food of the day. If esophageal irritation is noted, she will stop the drug and call my office.  Treatment with bisphosphonate therapy will decrease fracture risk 50-70%.   There is risk of osteonecrosis of the jaw in patients using bisphosphonates is approximately 1/1700-1/100,000, with development most likely related to invasive dental procedures. If an invasive dental procedure was necessary, preferably stop the bisphosphonate approximately 3 months prior to reduce the risk. Let your dentist know that you are on this medication.  The data available at this time suggests that there is probably a small increase risk of atypical (nontraumatic) subtrochanteric fractures of the femur in patients on bisphosphonate therapy compared to those not on it. One large study suggested that for every 100 fractures prevented with bisphosphonate therapy, less than one femur fracture will occur. Other studies suggest one episode per 2,500 patient years. Patient should call with leg pain.        All questions were answered.  The patient indicates understanding of the above issues and agrees with the plan set forth.        Follow-up:  1 year.    Yessica Rose MD  Endocrinology  [facility]  [place]  CC: Shannon Arciniega    Answers for HPI/ROS submitted by the patient on 12/5/2022  General Symptoms: No  Skin Symptoms: No  HENT Symptoms: No  EYE SYMPTOMS: No  HEART SYMPTOMS: No  LUNG SYMPTOMS: No  INTESTINAL SYMPTOMS: No  URINARY SYMPTOMS: No  GYNECOLOGIC SYMPTOMS: No  BREAST SYMPTOMS: No  SKELETAL SYMPTOMS: No  BLOOD SYMPTOMS: No  NERVOUS SYSTEM SYMPTOMS: No  MENTAL HEALTH SYMPTOMS: No          Again, thank you for allowing me to participate in the care of your patient.        Sincerely,        Yessica Rose MD

## 2022-12-12 NOTE — PROGRESS NOTES
"THIS IS A VIDEO VISIT:    Phone call visit/virtual visit encounter:    Name of patient: Julia Key    Date of encounter: 12/12/2022    Time of start of video visit: 2:30    Video started: 2:42    Video ended: 3:02    Provider location: working from home/ [facility]    Patient location: patients home.    Mode of transmission: [employer] video/ HereOrThere    Verbal consent: obtained before starting visit. Pt is agreeable.      The patient has been notified of following:      \"This VIDEO visit will be conducted via a call between you and your physician/provider. We have found that certain health care needs can be provided without the need for a physical exam.  This service lets us provide the care you need with a short phone conversation.  If a prescription is necessary we can send it directly to your pharmacy.  If lab work is needed we can place an order for that and you can then stop by our lab to have the test done at a later time.     With new updates with corona virus patient might be billed as clinic visit.     If during the course of the call the physician/provider feels a telephone visit is not appropriate, you will not be charged for this service.\"      Past medical history, social history, family history, allergy and medications were reviewed and updated as appropriate.  Reviewed pertinent labs, notes, imaging studies personally.      Name: Julia Key  Date: 12/12/2022  Seen in consultation with Shannon Arciniega for osteoporosis.     HPI:  Julia Key is a 62 year old female who presents for the evaluation of osteoporosis.   has a past medical history of Abnormal cardiovascular stress test (8/2/2022), Depression, prolonged, Hyperlipidemia, and Osteopenia.    She was diagnosed with osteopenia few years back and then osteoporosis with DEXA 7/2022.    DEXA 2015: Osteopenia.    DEXA 7/2022:Osteoporosis. There has been probable significant decrease in bone density of the lumbar spine and of the hip(s). "     RISK FACTORS:  Post-menopausal, Parent history of osteoporosis, Parent history of a hip fracture, follow up osteopenia.    GERD: yes- under control with diet only. Not on medication.    Dental health: normal. No major dental procedures planned.    Kidney function: within normal limits    Previous treatment for osteopenia/osteoporosis: previous Fosamax for one month in 2013 (she had dizziness on it and GI problems)    Current treatment for Osteopenia/Osteoporosis: Calcium, Vitamin D    Smoke:No  Family History:Yes: mother with osteoporosis  Menstrual history/Birthing: s/p menopause. Took HRT for about 6 years. Last use was May 2020.  Fractures:No  Kidney stones: No  GI Surgery:No  Duration of therapy:  As noted above  Exercise:Yes: 4-5 times a week- 30-60 min each time.  Diet:  0-1 servings of dairy/day  Ca/Vitamin D: 1200 mg of calcium/day, 2000 international unit(s) of vit D/day  Alcohol:  No  Eating Disorder: No  Steroid Use:  No  PMH/PSH:  Past Medical History:   Diagnosis Date     Abnormal cardiovascular stress test 8/2/2022     Depression, prolonged     Cyclical related to 's health     Hyperlipidemia     One high reading late Fall 2016     Osteopenia      Past Surgical History:   Procedure Laterality Date     CV CORONARY ANGIOGRAM N/A 8/11/2022    Procedure: Coronary Angiogram;  Surgeon: Diana Smith MD;  Location:  HEART CARDIAC CATH LAB     [facility] SHOULDER G/E 2 VIEWS  01/01/1999 1997, 1999 - decompression, clavicle resection     Family Hx:  Family History   Problem Relation Age of Onset     Osteoporosis Mother      Dementia Mother      Hyperlipidemia Mother      Hypertension Father      Cerebrovascular Disease Father      Coronary Artery Disease Father 30     Coronary Artery Disease Brother      No Known Problems Maternal Grandmother      No Known Problems Maternal Grandfather      Myocardial Infarction Paternal Grandmother      Obesity Paternal Grandmother        Social Hx:  Social  History     Socioeconomic History     Marital status:      Spouse name: Not on file     Number of children: Not on file     Years of education: Not on file     Highest education level: Not on file   Occupational History     Occupation: Retired .   Tobacco Use     Smoking status: Never     Smokeless tobacco: Never   Vaping Use     Vaping Use: Never used   Substance and Sexual Activity     Alcohol use: Yes     Comment: 2-3 drinks/month, socially     Drug use: Never     Sexual activity: Yes     Partners: Male     Birth control/protection: None   Other Topics Concern     Parent/sibling w/ CABG, MI or angioplasty before 65F 55M? 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 One daughter, one dog.     Social Determinants of Health     Financial Resource Strain: Low Risk      Difficulty of Paying Living Expenses: Not hard at all   Food Insecurity: No Food Insecurity     Worried About Running Out of Food in the Last Year: Never true     Ran Out of Food in the Last Year: Never true   Transportation Needs: No Transportation Needs     Lack of Transportation (Medical): No     Lack of Transportation (Non-Medical): No   Physical Activity: Insufficiently Active     Days of Exercise per Week: 3 days     Minutes of Exercise per Session: 30 min   Stress: No Stress Concern Present     Feeling of Stress : Not at all   Social Connections: Moderately Integrated     Frequency of Communication with Friends and Family: More than three times a week     Frequency of Social Gatherings with Friends and Family: Once a week     Attends Yarsanism Services: 1 to 4 times per year     Active Member of Clubs or Organizations: No     Attends Club or Organization Meetings: Not on file     Marital Status:    Intimate Partner Violence: Not on file   Housing Stability: Low Risk      Unable to Pay for Housing in the Last Year: No     Number  of Places Lived in the Last Year: 1     Unstable Housing in the Last Year: No          MEDICATIONS:  has a current medication list which includes the following prescription(s): calcium-vitamin d, carvedilol, cetirizine, nitroglycerin, rosuvastatin, and triamcinolone.    ROS     ROS: 10 point ROS neg other than the symptoms noted above in the HPI.    Physical Exam   VS: There were no vitals taken for this visit.  GENERAL: healthy, alert and no distress  EYES: Eyes grossly normal to inspection, conjunctivae and sclerae normal  ENT: no nose swelling, nasal discharge.  Thyroid: no apparent thyroid nodules  RESP: no audible wheeze, cough, or visible cyanosis.  No visible retractions or increased work of breathing.  Able to speak fully in complete sentences.  ABDO: not evaluated.  EXTREMITIES: no hand tremors.  NEURO: Cranial nerves grossly intact, mentation intact and speech normal  SKIN: No apparent skin lesions, rash or edema seen   PSYCH: mentation appears normal, affect normal/bright, judgement and insight intact, normal speech and appearance well-groomed      LABS:  Calcium:  ENDO CALCIUM LABS-[facility] Latest Ref Rng & Units 8/11/2022   CALCIUM 8.5 - 10.1 mg/dL 9.4     Creatinine:  Creatinine   Date Value Ref Range Status   08/11/2022 0.82 0.52 - 1.04 mg/dL Final   03/29/2021 0.76 0.52 - 1.04 mg/dL Final       TFTs:  Lab Results   Component Value Date    TSH 3.01 12/16/2016       PTH:  Vitamin D:  Vitamin D Deficiency Screening Results:  No results found for: VITDT    BoneTurnOverMarkers:    DEXA:      All pertinent notes, labs, and images personally reviewed by me.     A/P  Ms.Mary ERIKA Key is a 62 year old here for the evaluation of:    (M81.0) Age-related osteoporosis without current pathological fracture  Comment: Risk factors for low bone density include personal history of fracture or family history, , female, Estrogen deficiency.   DEXA 7/2022:Osteoporosis. There has been probable significant decrease in  bone density of the lumbar spine and of the hip(s).   Normal calcium, creatinine.  No recent vit D to review.  Plan:   Discussed diagnosis, pathophysiology, management and treatment options of condition with pt.  Labs needed.  If labs (vit D) are in range- Recommend to start FOSAMAX 70 mg once a week.  DEXA in 1-2 years.  Follow up in 1 year.    Plan: TSH with free T4 reflex, Calcium, Creatinine,         Vitamin D Deficiency, alendronate (FOSAMAX) 70         MG tablet         Discussed indications, risks and benefits of all medications prescribed, and answered questions to patient's satisfaction.    The pt was advised to    Maintain an adequate calcium and vitamin D intake and to supplement vitamin D if needed to maintain serum levels of 25 hydroxy D (25 OH D) between 30-60 ng/ml.    Limit alcohol intake to no more than 2 servings per day.    Limit caffeine intake.    Maintain an active lifestyle including weight-bearing exercises for at least 30 mins daily.    Take measures to reduce the risk of falling.    Instructions on Fosamax use and side effects - particularly esophageal adverse events - are carefully reviewed with her. This drug must be taken upon arising for the day on an empty stomach, with a large 6-8 ounce glass of water; she must remain NPO in the upright position for at least 30 minutes afterwards and until after the first food of the day. If esophageal irritation is noted, she will stop the drug and call my office.  Treatment with bisphosphonate therapy will decrease fracture risk 50-70%.   There is risk of osteonecrosis of the jaw in patients using bisphosphonates is approximately 1/1700-1/100,000, with development most likely related to invasive dental procedures. If an invasive dental procedure was necessary, preferably stop the bisphosphonate approximately 3 months prior to reduce the risk. Let your dentist know that you are on this medication.  The data available at this time suggests that there  is probably a small increase risk of atypical (nontraumatic) subtrochanteric fractures of the femur in patients on bisphosphonate therapy compared to those not on it. One large study suggested that for every 100 fractures prevented with bisphosphonate therapy, less than one femur fracture will occur. Other studies suggest one episode per 2,500 patient years. Patient should call with leg pain.        All questions were answered.  The patient indicates understanding of the above issues and agrees with the plan set forth.        Follow-up:  1 year.    Yessica Rose MD  Endocrinology  [facility]  CC: Shannon Arciniega    Answers for HPI/ROS submitted by the patient on 12/5/2022  General Symptoms: No  Skin Symptoms: No  HENT Symptoms: No  EYE SYMPTOMS: No  HEART SYMPTOMS: No  LUNG SYMPTOMS: No  INTESTINAL SYMPTOMS: No  URINARY SYMPTOMS: No  GYNECOLOGIC SYMPTOMS: No  BREAST SYMPTOMS: No  SKELETAL SYMPTOMS: No  BLOOD SYMPTOMS: No  NERVOUS SYSTEM SYMPTOMS: No  MENTAL HEALTH SYMPTOMS: No

## 2022-12-21 ENCOUNTER — LAB (OUTPATIENT)
Dept: LAB | Facility: CLINIC | Age: 62
End: 2022-12-21
Payer: COMMERCIAL

## 2022-12-21 DIAGNOSIS — Z11.59 NEED FOR HEPATITIS C SCREENING TEST: ICD-10-CM

## 2022-12-21 DIAGNOSIS — Z11.4 SCREENING FOR HIV (HUMAN IMMUNODEFICIENCY VIRUS): ICD-10-CM

## 2022-12-21 LAB
CALCIUM SERPL-MCNC: 10.2 MG/DL (ref 8.8–10.2)
CREAT SERPL-MCNC: 0.8 MG/DL (ref 0.51–0.95)
DEPRECATED CALCIDIOL+CALCIFEROL SERPL-MC: 45 UG/L (ref 20–75)
GFR SERPL CREATININE-BSD FRML MDRD: 82 ML/MIN/1.73M2
HCV AB SERPL QL IA: NONREACTIVE
HIV 1+2 AB+HIV1 P24 AG SERPL QL IA: NONREACTIVE
TSH SERPL DL<=0.005 MIU/L-ACNC: 2.77 UIU/ML (ref 0.3–4.2)

## 2022-12-21 PROCEDURE — 87389 HIV-1 AG W/HIV-1&-2 AB AG IA: CPT

## 2022-12-21 PROCEDURE — 36415 COLL VENOUS BLD VENIPUNCTURE: CPT

## 2022-12-21 PROCEDURE — 82565 ASSAY OF CREATININE: CPT | Performed by: INTERNAL MEDICINE

## 2022-12-21 PROCEDURE — 86803 HEPATITIS C AB TEST: CPT

## 2022-12-21 PROCEDURE — 82310 ASSAY OF CALCIUM: CPT | Performed by: INTERNAL MEDICINE

## 2022-12-21 PROCEDURE — 82306 VITAMIN D 25 HYDROXY: CPT | Performed by: INTERNAL MEDICINE

## 2022-12-21 PROCEDURE — 84443 ASSAY THYROID STIM HORMONE: CPT | Performed by: INTERNAL MEDICINE

## 2022-12-22 NOTE — RESULT ENCOUNTER NOTE
Julia    Recently done endocrinology lab test/ imaging test showed:  Labs in range--Recommend to start FOSAMAX 70 mg once a week.    Here is a copy for your records.    Follow up as discussed in last clinic visit.    Please call endocrinology clinic ( [phone]) if questions.    Yessica Rose MD  Endocrinology   [facility]  December 22, 2022

## 2022-12-29 DIAGNOSIS — E78.00 PURE HYPERCHOLESTEROLEMIA: ICD-10-CM

## 2022-12-29 RX ORDER — ROSUVASTATIN CALCIUM 20 MG/1
20 TABLET, COATED ORAL DAILY
Qty: 90 TABLET | Refills: 0 | Status: SHIPPED | OUTPATIENT
Start: 2022-12-29 | End: 2023-02-24

## 2022-12-29 NOTE — TELEPHONE ENCOUNTER
Received call from pt requesting refill of rosuvastatin 20 mg daily. [facility] Refill Guideline reviewed.  Medication meets criteria for refill.    Pt has labs and follow up scheduled with Dr. Alston on 2/24/23. Prescription sent to pharmacy for a 90 day supply and 0 refills.

## 2023-01-31 ENCOUNTER — VIRTUAL VISIT (OUTPATIENT)
Dept: ENDOCRINOLOGY | Facility: CLINIC | Age: 63
End: 2023-01-31
Payer: COMMERCIAL

## 2023-01-31 DIAGNOSIS — M81.0 AGE RELATED OSTEOPOROSIS, UNSPECIFIED PATHOLOGICAL FRACTURE PRESENCE: Primary | ICD-10-CM

## 2023-01-31 PROCEDURE — 99214 OFFICE O/P EST MOD 30 MIN: CPT | Mod: GT | Performed by: INTERNAL MEDICINE

## 2023-01-31 RX ORDER — IBANDRONATE SODIUM 150 MG/1
150 TABLET, FILM COATED ORAL
Qty: 3 TABLET | Refills: 3 | Status: SHIPPED | OUTPATIENT
Start: 2023-01-31 | End: 2023-03-08

## 2023-01-31 NOTE — LETTER
"    1/31/2023         RE: Julia Key  [address] [place] [address]        Dear Colleague,    Thank you for referring your patient, Julia Key, to the [facility]. Please see a copy of my visit note below.    Julia Key  is being evaluated via a billable video visit.      How would you like to obtain your AVS? MyChart  For the video visit, send the invitation by: Send to e-mail at: [email or web address]  Will anyone else be joining your video visit? No      Cyndee Littlejohn CMA      THIS IS A VIDEO VISIT:    Phone call visit/virtual visit encounter:    Name of patient: Julia Key    Date of encounter: 1/31/2023    Time of start of video visit: 8:33    Video started: 8:37    Video ended: 8:48    Provider location: working from home/ [facility]    Patient location: patients home.    Mode of transmission: [employer] video/ xiao qu wu you    Verbal consent: obtained before starting visit. Pt is agreeable.      The patient has been notified of following:      \"This VIDEO visit will be conducted via a call between you and your physician/provider. We have found that certain health care needs can be provided without the need for a physical exam.  This service lets us provide the care you need with a short phone conversation.  If a prescription is necessary we can send it directly to your pharmacy.  If lab work is needed we can place an order for that and you can then stop by our lab to have the test done at a later time.     With new updates with corona virus patient might be billed as clinic visit.     If during the course of the call the physician/provider feels a telephone visit is not appropriate, you will not be charged for this service.\"      Past medical history, social history, family history, allergy and medications were reviewed and updated as appropriate.  Reviewed pertinent labs, notes, imaging studies personally.      Name: Julia Key  Date: 12/12/2022  Seen " for f/u of osteoporosis.     HPI:  Julia Key is a 62 year old female who presents for the evaluation of osteoporosis.   has a past medical history of Abnormal cardiovascular stress test (8/2/2022), Depression, prolonged, Hyperlipidemia, and Osteopenia.    She was diagnosed with osteopenia few years back and then osteoporosis with DEXA 7/2022.    DEXA 2015: Osteopenia.    DEXA 7/2022:Osteoporosis. There has been probable significant decrease in bone density of the lumbar spine and of the hip(s).     RISK FACTORS:  Post-menopausal, Parent history of osteoporosis, Parent history of a hip fracture, follow up osteopenia.  Started Fosamax 12/2022 but was not able to tolerate FOSAMAX 2/2 to GI s/e like severe abdominal pain.    GERD: yes- under control with diet only. Not on medication.    Dental health: normal. No major dental procedures planned.    Kidney function: within normal limits    Previous treatment for osteopenia/osteoporosis: previous Fosamax for one month in 2013 (she had dizziness on it and GI problems)    Current treatment for Osteopenia/Osteoporosis: Calcium, Vitamin D    Smoke:No  Family History:Yes: mother with osteoporosis  Menstrual history/Birthing: s/p menopause. Took HRT for about 6 years. Last use was May 2020.  Fractures:No  Kidney stones: No  GI Surgery:No  Duration of therapy:  As noted above  Exercise:Yes: 4-5 times a week- 30-60 min each time.  Diet:  0-1 servings of dairy/day  Ca/Vitamin D: 1200 mg of calcium/day, 2000 international unit(s) of vit D/day  Alcohol:  No  Eating Disorder: No  Steroid Use:  No  PMH/PSH:  Past Medical History:   Diagnosis Date     Abnormal cardiovascular stress test 8/2/2022     Depression, prolonged     Cyclical related to 's health     Hyperlipidemia     One high reading late Fall 2016     Osteopenia      Past Surgical History:   Procedure Laterality Date     CV CORONARY ANGIOGRAM N/A 8/11/2022    Procedure: Coronary Angiogram;  Surgeon: Sarah  MD Diana;  Location:  HEART CARDIAC CATH LAB     [facility] SHOULDER G/E 2 VIEWS  01/01/1999 1997, 1999 - decompression, clavicle resection     Family Hx:  Family History   Problem Relation Age of Onset     Osteoporosis Mother      Dementia Mother      Hyperlipidemia Mother      Hypertension Father      Cerebrovascular Disease Father      Coronary Artery Disease Father 30     Coronary Artery Disease Brother      No Known Problems Maternal Grandmother      No Known Problems Maternal Grandfather      Myocardial Infarction Paternal Grandmother      Obesity Paternal Grandmother        Social Hx:  Social History     Socioeconomic History     Marital status:      Spouse name: Not on file     Number of children: Not on file     Years of education: Not on file     Highest education level: Not on file   Occupational History     Occupation: Retired .   Tobacco Use     Smoking status: Never     Smokeless tobacco: Never   Vaping Use     Vaping Use: Never used   Substance and Sexual Activity     Alcohol use: Yes     Comment: 2-3 drinks/month, socially     Drug use: Never     Sexual activity: Yes     Partners: Male     Birth control/protection: None   Other Topics Concern     Parent/sibling w/ CABG, MI or angioplasty before 65F 55M? 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 One daughter, one dog.     Social Determinants of Health     Financial Resource Strain: Low Risk      Difficulty of Paying Living Expenses: Not hard at all   Food Insecurity: No Food Insecurity     Worried About Running Out of Food in the Last Year: Never true     Ran Out of Food in the Last Year: Never true   Transportation Needs: No Transportation Needs     Lack of Transportation (Medical): No     Lack of Transportation (Non-Medical): No   Physical Activity: Insufficiently Active     Days of Exercise per Week: 3 days     Minutes of Exercise  per Session: 30 min   Stress: No Stress Concern Present     Feeling of Stress : Not at all   Social Connections: Moderately Integrated     Frequency of Communication with Friends and Family: More than three times a week     Frequency of Social Gatherings with Friends and Family: Once a week     Attends Mormonism Services: 1 to 4 times per year     Active Member of Clubs or Organizations: No     Attends Club or Organization Meetings: Not on file     Marital Status:    Intimate Partner Violence: Not on file   Housing Stability: Low Risk      Unable to Pay for Housing in the Last Year: No     Number of Places Lived in the Last Year: 1     Unstable Housing in the Last Year: No          MEDICATIONS:  has a current medication list which includes the following prescription(s): calcium-vitamin d, carvedilol, cetirizine, nitroglycerin, rosuvastatin, and triamcinolone.    ROS     ROS: 10 point ROS neg other than the symptoms noted above in the HPI.    Physical Exam   VS: There were no vitals taken for this visit.  GENERAL: healthy, alert and no distress  EYES: Eyes grossly normal to inspection, conjunctivae and sclerae normal  ENT: no nose swelling, nasal discharge.  Thyroid: no apparent thyroid nodules  RESP: no audible wheeze, cough, or visible cyanosis.  No visible retractions or increased work of breathing.  Able to speak fully in complete sentences.  ABDO: not evaluated.  EXTREMITIES: no hand tremors.  NEURO: Cranial nerves grossly intact, mentation intact and speech normal  SKIN: No apparent skin lesions, rash or edema seen   PSYCH: mentation appears normal, affect normal/bright, judgement and insight intact, normal speech and appearance well-groomed      LABS:  Calcium:  ENDO CALCIUM LABS-[facility] Latest Ref Rng & Units 8/11/2022   CALCIUM 8.5 - 10.1 mg/dL 9.4     Creatinine:  Creatinine   Date Value Ref Range Status   12/21/2022 0.80 0.51 - 0.95 mg/dL Final   03/29/2021 0.76 0.52 - 1.04 mg/dL Final       TFTs:  Lab  Results   Component Value Date    TSH 3.01 12/16/2016       PTH:  Vitamin D:  Vitamin D Deficiency Screening Results:  Lab Results   Component Value Date    VITDT 45 12/21/2022       BoneTurnOverMarkers:    DEXA 7/2022:  FINDINGS:               Lumbar Spine (L1-L4)      T-score:  -2.5, degenerative changes present               Left Femoral Neck            T-score:  -1.7               Right Femoral Neck          T-score:  -1.3                  Lumbar (L1-L4) BMD: 0.889  Previous: 1.021                          Total Hip Mean BMD: 0.832  Previous: 0.880     Comparison is made to another DXA performed on a different Mo[employer] machine on 07/27/2015.       IMPRESSION  Osteoporosis., Degenerative changes of the lumbar spine which may falsely elevate results.     There has been probable significant decrease in bone density of the lumbar spine. There has been probable significant decrease in bone density of the hip(s).        All pertinent notes, labs, and images personally reviewed by me.     A/P  Ms.Mary ERIKA Key is a 62 year old here for the evaluation of:    Age-related osteoporosis without current pathological fracture  Comment: Risk factors for low bone density include personal history of fracture or family history, , female, Estrogen deficiency.   DEXA 7/2022:Osteoporosis. There has been probable significant decrease in bone density of the lumbar spine and of the hip(s).   Normal calcium, creatinine and vit D.  Not able to tolerate FOSAMAX 2/2 to GI s/e like abdominal pain.  Plan:   Discussed diagnosis, pathophysiology, management and treatment options of condition with pt.  As noted above she was not able to tolerate Fosamax.  Discussed other treatment options including trial of another oral bisphosphonate like Boniva versus switching to IV options like Reclast or Prolia.  At this time she is interested in trial of Boniva.  Plan to start Boniva 150 mg once a month.  If not tolerated then will consider  switching to Reclast or Prolia.  If she is able to tolerate Boniva then plan to continue Boniva for 1 year and follow-up at that time.         Discussed indications, risks and benefits of all medications prescribed, and answered questions to patient's satisfaction.    The pt was advised to    Maintain an adequate calcium and vitamin D intake and to supplement vitamin D if needed to maintain serum levels of 25 hydroxy D (25 OH D) between 30-60 ng/ml.    Limit alcohol intake to no more than 2 servings per day.    Limit caffeine intake.    Maintain an active lifestyle including weight-bearing exercises for at least 30 mins daily.    Take measures to reduce the risk of falling.    Instructions on for BONIVA Use and side effects - particularly esophageal adverse events - are carefully reviewed with her. This drug must be taken upon arising for the day on an empty stomach, with a large 6-8 ounce glass of water; she must remain NPO in the upright position for at least 30 minutes afterwards and until after the first food of the day. If esophageal irritation is noted, she will stop the drug and call my office.  Treatment with bisphosphonate therapy will decrease fracture risk 50-70%.   There is risk of osteonecrosis of the jaw in patients using bisphosphonates is approximately 1/1700-1/100,000, with development most likely related to invasive dental procedures. If an invasive dental procedure was necessary, preferably stop the bisphosphonate approximately 3 months prior to reduce the risk. Let your dentist know that you are on this medication.  The data available at this time suggests that there is probably a small increase risk of atypical (nontraumatic) subtrochanteric fractures of the femur in patients on bisphosphonate therapy compared to those not on it. One large study suggested that for every 100 fractures prevented with bisphosphonate therapy, less than one femur fracture will occur. Other studies suggest one episode  per 2,500 patient years. Patient should call with leg pain.        All questions were answered.  The patient indicates understanding of the above issues and agrees with the plan set forth.        Follow-up:  1 year.    Yessica Rose MD  Endocrinology  [facility]  CC: Shannon Arciniega        Again, thank you for allowing me to participate in the care of your patient.        Sincerely,        Yessica Rose MD

## 2023-01-31 NOTE — PATIENT INSTRUCTIONS
[facility]  Dr Rose, Endocrinology Department    [place] [address] [id number]  [place] [address]  Appointment [phone]  Fax: [phone]  [place]: Monday - Thursday      Start BONIVA 150 mg once a month.  Keep us posted if there are any side effects.  If side effects - stop medication and inform us.    Instructions on BONIVA use and side effects - particularly esophageal adverse events - are carefully reviewed with her. This drug must be taken upon arising for the day on an empty stomach, with a large 6-8 ounce glass of water; she must remain NPO in the upright position for at least 30 minutes afterwards and until after the first food of the day. If esophageal irritation is noted, she will stop the drug and call my office.  Treatment with bisphosphonate therapy will decrease fracture risk 50-70%.   There is risk of osteonecrosis of the jaw in patients using bisphosphonates is approximately 1700-1/100,000, with development most likely related to invasive dental procedures. If an invasive dental procedure was necessary, preferably stop the bisphosphonate approximately 3 months prior to reduce the risk. Let your dentist know that you are on this medication.  The data available at this time suggests that there is probably a small increase risk of atypical (nontraumatic) subtrochanteric fractures of the femur in patients on bisphosphonate therapy compared to those not on it. One large study suggested that for every 100 fractures prevented with bisphosphonate therapy, less than one femur fracture will occur. Other studies suggest one episode per 2,500 patient years. Patient should call with leg pain.      The pt was advised to  Maintain an adequate calcium and vitamin D intake and to supplement vitamin D if needed to maintain serum levels of 25 hydroxy D (25 OH D) between 30-60 ng/ml.  Limit alcohol intake to no more than 2 servings per day.  Limit  caffeine intake.  Maintain an active lifestyle including weight-bearing exercises for at least 30 mins daily.  Take measures to reduce the risk of falling.    You should get 1000- 1200 mg/day calcium in divided doses of no more than 500 mg/dose.  This INCLUDES what is in your food as well as what is in any supplements you may be taking.    Vit D about 800-1000 IU/day ( unless you have vit D deficiency- in that case higher dose)  Dietary sources of calcium:: These also contain vitamin D  Milk                            8 oz            300 mg calcium  Yogurt                          1 cup           400 mg calcium   Hard cheese                     1.5 oz          300 mg  Cottage cheese                  2 cup           300 mg  Orange juice with Calcium       8 oz            300 mg  Low fat dairy sources are recommended      You should get 30 minutes of moderate weight bearing exercise on most days of the week .  Weight bearing exercise includes such things as walking, jogging, hiking, dancing.  You should also get Strength training 2 or more times/week in addition to other weight -being exercise. Strength training uses weight or resistance beyond that seen in everyday activities -(pilates, weight training with free weights, weight machines or resistance bands)    Living with Osteoporosis: Preventing Fractures  If you have osteoporosis, you can do a lot to reduce its effect on your life. Knowing how to prevent fractures and spinal curvature can help you live more comfortably and safely with this disease.  Reducing your risk for fractures  The most common fracture sites in people with osteoporosis are the wrist, spine, and hip. These fractures are often caused by accidents and falls. All fractures are painful and may limit what you can do. But hip fractures are very serious. They often need surgery, and it can take months to recover. To reduce your risk for fractures:  Get regular exercise. Try walking, swimming, or  weight training.  Eat foods that are rich in calcium, or take calcium supplements.  Make your home safe to help avoid accidents.  Take extra precautions not to fall in risky areas, such as icy sidewalks.  Understanding spinal fractures  Your spine is made up of many bones called vertebrae. Osteoporosis can cause the vertebrae in your spine to collapse. As a result, your upper back may arch forward, creating a curvature. Spine fractures may also result from back strain and bad posture. You will also lose height. Your lower spine must then adjust to keep your body balanced. This can cause back pain. To prevent or lessen these spinal changes:  Practice good posture.  Use proper techniques if you need to lift heavy objects.  Do back exercises to help your posture.  Lie on your back when you have pain.  Ask your healthcare provider about these and other ways to help your spine.  Carma last reviewed this educational content on 5/1/2018 2000-2021 [employer]. All rights reserved. This information is not intended as a substitute for professional medical care. Always follow your healthcare professional's instructions.          Living with Osteoporosis: Regular Exercise  If you have osteoporosis, exercise is vital for your health. It can prevent bone fractures and spine changes. It will slow bone loss. Exercise will strengthen your body. It can also be fun. A variety of exercises is best. See below for exercises that can help you. But before you start, talk with your healthcare provider to be sure these exercises are right for you.   Resistance exercises. These build muscle strength and maintain bone mass. They also make you less prone to injury. Exercises include lifting small weights, doing push-ups and sit-ups, using elastic exercise bands, and using weight machines.   Weight-bearing activities. These help your whole body. They also help you maintain bone mass. Activities include walking, dancing, and  housework.   Non-weight-bearing exercises. These help prevent back strain and pain. They do this by building the trunk and leg muscles. Exercises that help with flexibility can prevent falls. Examples include swimming, water exercise, and stretching.   Staying safe  Here are tips to stay safe:   Always check with your healthcare provider before starting any new exercise program.  Use weights only as instructed.  Stop any exercise that causes pain.  [employer] last reviewed this educational content on 5/1/2018 2000-2021 [employer]. All rights reserved. This information is not intended as a substitute for professional medical care. Always follow your healthcare professional's instructions.          Preventing Osteoporosis: Avoiding Bone Loss  Certain factors can speed up bone loss or decrease bone growth. For example, alcohol, cigarettes, and certain medicines reduce bone mass. Some foods make it hard for your body to absorb calcium.  Things to avoid  Here are things to avoid to help prevent osteoporosis:  Alcohol. This is toxic to bones. It is a major cause of bone loss. Heavy drinking can cause osteoporosis even if you have no other risk factors.  Smoking. This reduces bone mass. Smoking may also interfere with estrogen levels and cause early menopause.  Inactivity. Not being active makes your bones lose strength and become thinner. Over time, thin bones may break. Women who aren't active are at a high risk for osteoporosis.  Certain medicines. Some medicines, such as cortisone, increase bone loss. They also decrease bone growth. Ask your healthcare provider about any side effects of your medicines, and how to prevent them.  Protein-rich or salty foods. Eaten in large amounts, these foods may deplete calcium.  Caffeine. This increases calcium loss. People who drink a lot of coffee, tea, or soda lose more calcium than those who don't.  [employer] last reviewed this educational content on 5/1/2018     8346-6899 [employer]. All rights reserved. This information is not intended as a substitute for professional medical care. Always follow your healthcare professional's instructions.          Preventing Osteoporosis: Meeting Your Calcium Needs  Your body needs calcium to build and repair bones. But it can't make calcium on its own. That's why it's important to eat calcium-rich foods. Some foods are naturally rich in calcium. Others have calcium added (fortified). It's best to get calcium from the foods you eat. But if you can't get enough, you may want to take calcium supplements. To meet your daily calcium needs, try the foods listed below.               Dairy Fish & beans Other sources   Source   Calcium (mg) per serving   Source   Calcium (mg) per serving   Source   Calcium (mg) per serving   Low-fat yogurt, plain   415 mg/8 oz.   Sardines, [place], canned, with bones   351 mg/3 oz.   Oatmeal, instant, fortified   215 mg/1 cup   Nonfat milk   302 mg/1 cup   [place], sockeye, canned, with bones   239 mg/3 oz.   Tofu made with calcium sulfate   204 mg/3 oz.   Low-fat milk   297 mg/1 cup   Soybeans, fresh, boiled   131 mg/1/2 cup   Collards   179 mg/1/2 cup   Swiss cheese   272 mg/1 oz.   White beans, cooked   81 mg/1/2 cup   English muffin, whole wheat   175 mg/1 muffin   Cheddar cheese   205 mg/1 oz.   Navy beans, cooked   79 mg/1/2 cup   Kale   90 mg/1/2 cup   Ice cream strawberry   79 mg/1/2 cup           Orange, navel   56 mg/1 medium   Note: Calcium levels may vary depending on brand and size.  Daily calcium needs  14 to 18 years old: 1,300 mg  19 to 30 years old: 1,000 mg  31 to 50 years old: 1,000 mg  51 to 70 years old, women: 1,200 mg  51 to 70 years old, men: 1,000 mg  Pregnant or nursin to 18 years old: 1,300 mg, 19 to 50 years old: 1,000 mg  Older than 70 (women and men): 1,200 mg   Josefina last reviewed this educational content on 2018-2021 [employer]. All  rights reserved. This information is not intended as a substitute for professional medical care. Always follow your healthcare professional's instructions.

## 2023-01-31 NOTE — PROGRESS NOTES
"THIS IS A VIDEO VISIT:    Phone call visit/virtual visit encounter:    Name of patient: Julia Key    Date of encounter: 1/31/2023    Time of start of video visit: 8:33    Video started: 8:37    Video ended: 8:48    Provider location: working from home/ [facility]    Patient location: patients home.    Mode of transmission: "[employer] video/ [employer]    Verbal consent: obtained before starting visit. Pt is agreeable.      The patient has been notified of following:      \"This VIDEO visit will be conducted via a call between you and your physician/provider. We have found that certain health care needs can be provided without the need for a physical exam.  This service lets us provide the care you need with a short phone conversation.  If a prescription is necessary we can send it directly to your pharmacy.  If lab work is needed we can place an order for that and you can then stop by our lab to have the test done at a later time.     With new updates with corona virus patient might be billed as clinic visit.     If during the course of the call the physician/provider feels a telephone visit is not appropriate, you will not be charged for this service.\"      Past medical history, social history, family history, allergy and medications were reviewed and updated as appropriate.  Reviewed pertinent labs, notes, imaging studies personally.      Name: Julia Key  Date: 12/12/2022  Seen for f/u of osteoporosis.     HPI:  Julia Key is a 62 year old female who presents for the evaluation of osteoporosis.   has a past medical history of Abnormal cardiovascular stress test (8/2/2022), Depression, prolonged, Hyperlipidemia, and Osteopenia.    She was diagnosed with osteopenia few years back and then osteoporosis with DEXA 7/2022.    DEXA 2015: Osteopenia.    DEXA 7/2022:Osteoporosis. There has been probable significant decrease in bone density of the lumbar spine and of the hip(s).     RISK FACTORS: "  Post-menopausal, Parent history of osteoporosis, Parent history of a hip fracture, follow up osteopenia.  Started Fosamax 12/2022 but was not able to tolerate FOSAMAX 2/2 to GI s/e like severe abdominal pain.    GERD: yes- under control with diet only. Not on medication.    Dental health: normal. No major dental procedures planned.    Kidney function: within normal limits    Previous treatment for osteopenia/osteoporosis: previous Fosamax for one month in 2013 (she had dizziness on it and GI problems)    Current treatment for Osteopenia/Osteoporosis: Calcium, Vitamin D    Smoke:No  Family History:Yes: mother with osteoporosis  Menstrual history/Birthing: s/p menopause. Took HRT for about 6 years. Last use was May 2020.  Fractures:No  Kidney stones: No  GI Surgery:No  Duration of therapy:  As noted above  Exercise:Yes: 4-5 times a week- 30-60 min each time.  Diet:  0-1 servings of dairy/day  Ca/Vitamin D: 1200 mg of calcium/day, 2000 international unit(s) of vit D/day  Alcohol:  No  Eating Disorder: No  Steroid Use:  No  PMH/PSH:  Past Medical History:   Diagnosis Date     Abnormal cardiovascular stress test 8/2/2022     Depression, prolonged     Cyclical related to 's health     Hyperlipidemia     One high reading late Fall 2016     Osteopenia      Past Surgical History:   Procedure Laterality Date     CV CORONARY ANGIOGRAM N/A 8/11/2022    Procedure: Coronary Angiogram;  Surgeon: Diana Smith MD;  Location:  HEART CARD[facility] CATH LAB     [facility] LT SHOULDER G/E 2 VIEWS  01/01/1999 1997, 1999 - decompression, clavicle resection     Family Hx:  Family History   Problem Relation Age of Onset     Osteoporosis Mother      Dementia Mother      Hyperlipidemia Mother      Hypertension Father      Cerebrovascular Disease Father      Coronary Artery Disease Father 30     Coronary Artery Disease Brother      No Known Problems Maternal Grandmother      No Known Problems Maternal Grandfather      Myocardial  Infarction Paternal Grandmother      Obesity Paternal Grandmother        Social Hx:  Social History     Socioeconomic History     Marital status:      Spouse name: Not on file     Number of children: Not on file     Years of education: Not on file     Highest education level: Not on file   Occupational History     Occupation: Retired .   Tobacco Use     Smoking status: Never     Smokeless tobacco: Never   Vaping Use     Vaping Use: Never used   Substance and Sexual Activity     Alcohol use: Yes     Comment: 2-3 drinks/month, socially     Drug use: Never     Sexual activity: Yes     Partners: Male     Birth control/protection: None   Other Topics Concern     Parent/sibling w/ CABG, MI or angioplasty before 65F 55M? 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 One daughter, one dog.     Social Determinants of Health     Financial Resource Strain: Low Risk      Difficulty of Paying Living Expenses: Not hard at all   Food Insecurity: No Food Insecurity     Worried About Running Out of Food in the Last Year: Never true     Ran Out of Food in the Last Year: Never true   Transportation Needs: No Transportation Needs     Lack of Transportation (Medical): No     Lack of Transportation (Non-Medical): No   Physical Activity: Insufficiently Active     Days of Exercise per Week: 3 days     Minutes of Exercise per Session: 30 min   Stress: No Stress Concern Present     Feeling of Stress : Not at all   Social Connections: Moderately Integrated     Frequency of Communication with Friends and Family: More than three times a week     Frequency of Social Gatherings with Friends and Family: Once a week     Attends Spiritism Services: 1 to 4 times per year     Active Member of Clubs or Organizations: No     Attends Club or Organization Meetings: Not on file     Marital Status:    Intimate Partner Violence: Not on file    Housing Stability: Low Risk      Unable to Pay for Housing in the Last Year: No     Number of Places Lived in the Last Year: 1     Unstable Housing in the Last Year: No          MEDICATIONS:  has a current medication list which includes the following prescription(s): calcium-vitamin d, carvedilol, cetirizine, nitroglycerin, rosuvastatin, and triamcinolone.    ROS     ROS: 10 point ROS neg other than the symptoms noted above in the HPI.    Physical Exam   VS: There were no vitals taken for this visit.  GENERAL: healthy, alert and no distress  EYES: Eyes grossly normal to inspection, conjunctivae and sclerae normal  ENT: no nose swelling, nasal discharge.  Thyroid: no apparent thyroid nodules  RESP: no audible wheeze, cough, or visible cyanosis.  No visible retractions or increased work of breathing.  Able to speak fully in complete sentences.  ABDO: not evaluated.  EXTREMITIES: no hand tremors.  NEURO: Cranial nerves grossly intact, mentation intact and speech normal  SKIN: No apparent skin lesions, rash or edema seen   PSYCH: mentation appears normal, affect normal/bright, judgement and insight intact, normal speech and appearance well-groomed      LABS:  Calcium:  ENDO CALCIUM LABS-[facility] Latest Ref Rng & Units 8/11/2022   CALCIUM 8.5 - 10.1 mg/dL 9.4     Creatinine:  Creatinine   Date Value Ref Range Status   12/21/2022 0.80 0.51 - 0.95 mg/dL Final   03/29/2021 0.76 0.52 - 1.04 mg/dL Final       TFTs:  Lab Results   Component Value Date    TSH 3.01 12/16/2016       PTH:  Vitamin D:  Vitamin D Deficiency Screening Results:  Lab Results   Component Value Date    VITDT 45 12/21/2022       BoneTurnOverMarkers:    DEXA 7/2022:  FINDINGS:               Lumbar Spine (L1-L4)      T-score:  -2.5, degenerative changes present               Left Femoral Neck            T-score:  -1.7               Right Femoral Neck          T-score:  -1.3                  Lumbar (L1-L4) BMD: 0.889  Previous:  1.021                          Total Hip Mean BMD: 0.832  Previous: 0.880     Comparison is made to another DXA performed on a different Lunar machine on 07/27/2015.       IMPRESSION  Osteoporosis., Degenerative changes of the lumbar spine which may falsely elevate results.     There has been probable significant decrease in bone density of the lumbar spine. There has been probable significant decrease in bone density of the hip(s).        All pertinent notes, labs, and images personally reviewed by me.     A/P  Ms.Mary ERIKA Key is a 62 year old here for the evaluation of:    Age-related osteoporosis without current pathological fracture  Comment: Risk factors for low bone density include personal history of fracture or family history, , female, Estrogen deficiency.   DEXA 7/2022:Osteoporosis. There has been probable significant decrease in bone density of the lumbar spine and of the hip(s).   Normal calcium, creatinine and vit D.  Not able to tolerate FOSAMAX 2/2 to GI s/e like abdominal pain.  Plan:   Discussed diagnosis, pathophysiology, management and treatment options of condition with pt.  As noted above she was not able to tolerate Fosamax.  Discussed other treatment options including trial of another oral bisphosphonate like Boniva versus switching to IV options like Reclast or Prolia.  At this time she is interested in trial of Boniva.  Plan to start Boniva 150 mg once a month.  If not tolerated then will consider switching to Reclast or Prolia.  If she is able to tolerate Boniva then plan to continue Boniva for 1 year and follow-up at that time.         Discussed indications, risks and benefits of all medications prescribed, and answered questions to patient's satisfaction.    The pt was advised to    Maintain an adequate calcium and vitamin D intake and to supplement vitamin D if needed to maintain serum levels of 25 hydroxy D (25 OH D) between 30-60 ng/ml.    Limit alcohol intake to no more  than 2 servings per day.    Limit caffeine intake.    Maintain an active lifestyle including weight-bearing exercises for at least 30 mins daily.    Take measures to reduce the risk of falling.    Instructions on for BONIVA Use and side effects - particularly esophageal adverse events - are carefully reviewed with her. This drug must be taken upon arising for the day on an empty stomach, with a large 6-8 ounce glass of water; she must remain NPO in the upright position for at least 30 minutes afterwards and until after the first food of the day. If esophageal irritation is noted, she will stop the drug and call my office.  Treatment with bisphosphonate therapy will decrease fracture risk 50-70%.   There is risk of osteonecrosis of the jaw in patients using bisphosphonates is approximately 1/1700-1/100,000, with development most likely related to invasive dental procedures. If an invasive dental procedure was necessary, preferably stop the bisphosphonate approximately 3 months prior to reduce the risk. Let your dentist know that you are on this medication.  The data available at this time suggests that there is probably a small increase risk of atypical (nontraumatic) subtrochanteric fractures of the femur in patients on bisphosphonate therapy compared to those not on it. One large study suggested that for every 100 fractures prevented with bisphosphonate therapy, less than one femur fracture will occur. Other studies suggest one episode per 2,500 patient years. Patient should call with leg pain.        All questions were answered.  The patient indicates understanding of the above issues and agrees with the plan set forth.        Follow-up:  1 year.    Yessica Rose MD  Endocrinology  [facility]  CC: Shannon Arciniega

## 2023-01-31 NOTE — PROGRESS NOTES
Julia Key  is being evaluated via a billable video visit.      How would you like to obtain your AVS? [employer]  For the video visit, send the invitation by: Send to e-mail at: [email or web address]  Will anyone else be joining your video visit? No      Cyndee Littlejohn CMA

## 2023-02-24 ENCOUNTER — LAB (OUTPATIENT)
Dept: LAB | Facility: CLINIC | Age: 63
End: 2023-02-24
Payer: COMMERCIAL

## 2023-02-24 ENCOUNTER — OFFICE VISIT (OUTPATIENT)
Dept: CARDIOLOGY | Facility: CLINIC | Age: 63
End: 2023-02-24
Attending: NURSE PRACTITIONER
Payer: COMMERCIAL

## 2023-02-24 VITALS
SYSTOLIC BLOOD PRESSURE: 123 MMHG | BODY MASS INDEX: 24.66 KG/M2 | DIASTOLIC BLOOD PRESSURE: 75 MMHG | HEIGHT: 65 IN | WEIGHT: 148 LBS | HEART RATE: 90 BPM | OXYGEN SATURATION: 97 %

## 2023-02-24 DIAGNOSIS — E78.00 PURE HYPERCHOLESTEROLEMIA: ICD-10-CM

## 2023-02-24 DIAGNOSIS — I10 BENIGN ESSENTIAL HYPERTENSION: ICD-10-CM

## 2023-02-24 DIAGNOSIS — E78.00 PURE HYPERCHOLESTEROLEMIA: Primary | ICD-10-CM

## 2023-02-24 LAB
ALBUMIN SERPL BCG-MCNC: 4.4 G/DL (ref 3.5–5.2)
ALP SERPL-CCNC: 71 U/L (ref 35–104)
ALT SERPL W P-5'-P-CCNC: 25 U/L (ref 10–35)
ANION GAP SERPL CALCULATED.3IONS-SCNC: 7 MMOL/L (ref 7–15)
AST SERPL W P-5'-P-CCNC: 26 U/L (ref 10–35)
BILIRUB SERPL-MCNC: 0.6 MG/DL
BUN SERPL-MCNC: 18.8 MG/DL (ref 8–23)
CALCIUM SERPL-MCNC: 9.5 MG/DL (ref 8.8–10.2)
CHLORIDE SERPL-SCNC: 103 MMOL/L (ref 98–107)
CHOLEST SERPL-MCNC: 169 MG/DL
CREAT SERPL-MCNC: 0.8 MG/DL (ref 0.51–0.95)
DEPRECATED HCO3 PLAS-SCNC: 29 MMOL/L (ref 22–29)
GFR SERPL CREATININE-BSD FRML MDRD: 82 ML/MIN/1.73M2
GLUCOSE SERPL-MCNC: 90 MG/DL (ref 70–99)
HDLC SERPL-MCNC: 64 MG/DL
LDLC SERPL CALC-MCNC: 85 MG/DL
NONHDLC SERPL-MCNC: 105 MG/DL
POTASSIUM SERPL-SCNC: 4.1 MMOL/L (ref 3.4–5.3)
PROT SERPL-MCNC: 7.7 G/DL (ref 6.4–8.3)
SODIUM SERPL-SCNC: 139 MMOL/L (ref 136–145)
TRIGL SERPL-MCNC: 100 MG/DL

## 2023-02-24 PROCEDURE — 99214 OFFICE O/P EST MOD 30 MIN: CPT | Performed by: INTERNAL MEDICINE

## 2023-02-24 PROCEDURE — 80053 COMPREHEN METABOLIC PANEL: CPT | Performed by: INTERNAL MEDICINE

## 2023-02-24 PROCEDURE — 36415 COLL VENOUS BLD VENIPUNCTURE: CPT | Performed by: INTERNAL MEDICINE

## 2023-02-24 PROCEDURE — 80061 LIPID PANEL: CPT | Performed by: INTERNAL MEDICINE

## 2023-02-24 RX ORDER — CARVEDILOL 3.12 MG/1
3.12 TABLET ORAL 2 TIMES DAILY WITH MEALS
Qty: 200 TABLET | Refills: 5 | Status: CANCELLED | OUTPATIENT
Start: 2023-02-24

## 2023-02-24 RX ORDER — ROSUVASTATIN CALCIUM 20 MG/1
20 TABLET, COATED ORAL DAILY
Qty: 100 TABLET | Refills: 5 | Status: SHIPPED | OUTPATIENT
Start: 2023-02-24 | End: 2024-02-26

## 2023-02-24 RX ORDER — LOSARTAN POTASSIUM 25 MG/1
25 TABLET ORAL EVERY MORNING
Qty: 100 TABLET | Refills: 5 | Status: SHIPPED | OUTPATIENT
Start: 2023-02-24 | End: 2024-02-26

## 2023-02-24 NOTE — PROGRESS NOTES
"  Clinic visit note dictated. Dictation reference number - [id number]        PHYSICAL EXAMINATION:  Vitals: /75   Pulse 90   Ht 1.651 m (5' 5\")   Wt 67.1 kg (148 lb)   SpO2 97%   BMI 24.63 kg/m    Wt Readings from Last 5 Encounters:   02/24/23 67.1 kg (148 lb)   09/08/22 66.4 kg (146 lb 4.8 oz)   08/11/22 64.4 kg (142 lb)   08/04/22 65.3 kg (144 lb)   07/27/22 64 kg (141 lb)           Today's clinic visit entailed:  The level of medical decision making during this visit was of moderate complexity.        Encounter Diagnoses   Name Primary?     Pure hypercholesterolemia Yes     Benign essential hypertension          Orders Placed This Encounter   Procedures     Lipid Profile     Comprehensive metabolic panel     Follow-Up with Cardiology           CURRENT MEDICATIONS:  Current Outpatient Medications   Medication Sig Dispense Refill     CALCIUM-VITAMIN D PO Take 1 tablet by mouth 2 times daily       cetirizine (ZYRTEC) 10 MG tablet Take 10 mg by mouth daily       IBANdronate (BONIVA) 150 MG tablet Take 1 tablet (150 mg) by mouth every 30 days 3 tablet 3     losartan (COZAAR) 25 MG tablet Take 1 tablet (25 mg) by mouth every morning 100 tablet 5     nitroGLYcerin (NITROSTAT) 0.4 MG sublingual tablet For chest pain place 1 tablet under the tongue every 5 minutes for 3 doses. If symptoms persist 5 minutes after 1st dose call 911. 15 tablet 4     rosuvastatin (CRESTOR) 20 MG tablet Take 1 tablet (20 mg) by mouth daily 100 tablet 5     triamcinolone (KENALOG) 0.1 % external cream Apply topically 2 times daily 45 g 0         ALLERGIES:  Allergies   Allergen Reactions     Fosamax [Alendronic Acid] Swelling     Lips and throat swelling     Iodine Hives     Latex        PAST MEDICAL HISTORY:    Past Medical History:   Diagnosis Date     Abnormal cardiovascular stress test 8/2/2022     Depression, prolonged     Cyclical related to 's health     Hyperlipidemia     One high reading late Fall 2016     Osteopenia  "       PAST SURGICAL HISTORY:    Past Surgical History:   Procedure Laterality Date     CV CORONARY ANGIOGRAM N/A 8/11/2022    Procedure: Coronary Angiogram;  Surgeon: Diana Smith MD;  Location:  HEART CARDI[facility] CATH LAB     [facility] LT SHOULDER G/E 2 VIEWS  01/01/1999 1997, 1999 - decompression, clavicle resection       FAMILY HISTORY:    Family History   Problem Relation Age of Onset     Osteoporosis Mother      Dementia Mother      Hyperlipidemia Mother      Hypertension Father      Cerebrovascular Disease Father      Coronary Artery Disease Father 30     Coronary Artery Disease Brother      No Known Problems Maternal Grandmother      No Known Problems Maternal Grandfather      Myocardial Infarction Paternal Grandmother      Obesity Paternal Grandmother        SOCIAL HISTORY:    Social History     Socioeconomic History     Marital status:      Spouse name: None     Number of children: None     Years of education: None     Highest education level: None   Occupational History     Occupation: Retired .   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 One daughter, one dog.     Social Determinants of Health     Financial Resource Strain: Low Risk      Difficulty of Paying Living Expenses: Not hard at all   Food Insecurity: No Food Insecurity     Worried About Running Out of Food in the Last Year: Never true     Ran Out of Food in the Last Year: Never true   Transportation Needs: No Transportation Needs     Lack of Transportation (Medical): No     Lack of Transportation (Non-Medical): No   Physical Activity: Insufficiently  Active     Days of Exercise per Week: 3 days     Minutes of Exercise per Session: 30 min   Stress: No Stress Concern Present     Feeling of Stress : Not at all   Social Connections: Moderately Integrated     Frequency of Communication with Friends and Family: More than three times a week     Frequency of Social Gatherings with Friends and Family: Once a week     Attends [employer] Services: 1 to 4 times per year     Active Member of Clubs or Organizations: No     Marital Status:    Housing Stability: Low Risk      Unable to Pay for Housing in the Last Year: No     Number of Places Lived in the Last Year: 1     Unstable Housing in the Last Year: No

## 2023-02-24 NOTE — LETTER
"2/24/2023    Shannon Arciniega, APRN CNP  [address] [place] [address]    RE: Julia JAMES Shahid       Dear Colleague,     I had the pleasure of seeing Julia ERIKA Shahid in the [facility].    Clinic visit note dictated. Dictation reference number - [id number]        PHYSICAL EXAMINATION:  Vitals: /75   Pulse 90   Ht 1.651 m (5' 5\")   Wt 67.1 kg (148 lb)   SpO2 97%   BMI 24.63 kg/m    Wt Readings from Last 5 Encounters:   02/24/23 67.1 kg (148 lb)   09/08/22 66.4 kg (146 lb 4.8 oz)   08/11/22 64.4 kg (142 lb)   08/04/22 65.3 kg (144 lb)   07/27/22 64 kg (141 lb)           Today's clinic visit entailed:  The level of medical decision making during this visit was of moderate complexity.        Encounter Diagnoses   Name Primary?     Pure hypercholesterolemia Yes     Benign essential hypertension          Orders Placed This Encounter   Procedures     Lipid Profile     Comprehensive metabolic panel     Follow-Up with Cardiology           CURRENT MEDICATIONS:  Current Outpatient Medications   Medication Sig Dispense Refill     CALCIUM-VITAMIN D PO Take 1 tablet by mouth 2 times daily       cetirizine (ZYRTEC) 10 MG tablet Take 10 mg by mouth daily       IBANdronate (BONIVA) 150 MG tablet Take 1 tablet (150 mg) by mouth every 30 days 3 tablet 3     losartan (COZAAR) 25 MG tablet Take 1 tablet (25 mg) by mouth every morning 100 tablet 5     nitroGLYcerin (NITROSTAT) 0.4 MG sublingual tablet For chest pain place 1 tablet under the tongue every 5 minutes for 3 doses. If symptoms persist 5 minutes after 1st dose call 911. 15 tablet 4     rosuvastatin (CRESTOR) 20 MG tablet Take 1 tablet (20 mg) by mouth daily 100 tablet 5     triamcinolone (KENALOG) 0.1 % external cream Apply topically 2 times daily 45 g 0         ALLERGIES:  Allergies   Allergen Reactions     Fosamax [Alendronic Acid] Swelling     Lips and throat swelling     Iodine Hives     Latex        PAST MEDICAL HISTORY:    Past Medical " History:   Diagnosis Date     Abnormal cardiovascular stress test 8/2/2022     Depression, prolonged     Cyclical related to 's health     Hyperlipidemia     One high reading late Fall 2016     Osteopenia        PAST SURGICAL HISTORY:    Past Surgical History:   Procedure Laterality Date     CV CORONARY ANGIOGRAM N/A 8/11/2022    Procedure: Coronary Angiogram;  Surgeon: Diana Smith MD;  Location:  HEART CARDIAC CATH LAB     [facility] LT SHOULDER G/E 2 VIEWS  01/01/1999 1997, 1999 - decompression, clavicle resection       FAMILY HISTORY:    Family History   Problem Relation Age of Onset     Osteoporosis Mother      Dementia Mother      Hyperlipidemia Mother      Hypertension Father      Cerebrovascular Disease Father      Coronary Artery Disease Father 30     Coronary Artery Disease Brother      No Known Problems Maternal Grandmother      No Known Problems Maternal Grandfather      Myocardial Infarction Paternal Grandmother      Obesity Paternal Grandmother        SOCIAL HISTORY:    Social History     Socioeconomic History     Marital status:      Spouse name: None     Number of children: None     Years of education: None     Highest education level: None   Occupational History     Occupation: Retired .   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     Social Determinants of Health     Financial Resource Strain: Low Risk      Difficulty of Paying Living Expenses: Not hard at all   Food Insecurity: No Food Insecurity     Worried About Running Out of Food in the Last Year: Never true      Ran Out of Food in the Last Year: Never true   Transportation Needs: No Transportation Needs     Lack of Transportation (Medical): No     Lack of Transportation (Non-Medical): No   Physical Activity: Insufficiently Active     Days of Exercise per Week: 3 days     Minutes of Exercise per Session: 30 min   Stress: No Stress Concern Present     Feeling of Stress : Not at all   Social Connections: Moderately Integrated     Frequency of Communication with Friends and Family: More than three times a week     Frequency of Social Gatherings with Friends and Family: Once a week     Attends [employer] Services: 1 to 4 times per year     Active Member of Clubs or Organizations: No     Marital Status:    Housing Stability: Low Risk      Unable to Pay for Housing in the Last Year: No     Number of Places Lived in the Last Year: 1     Unstable Housing in the Last Year: No     Thank you for allowing me to participate in the care of your patient.      Sincerely,     Sienna Alston MD     [facility]  cc:   Jody Caballero, CNP  [address] [place] [address]

## 2023-02-24 NOTE — PROGRESS NOTES
Service Date: 02/24/2023    PRIMARY CARE AND REFERRING PROVIDER:  DARLENE Acevedo CNP    REASON FOR VISIT:    1.  Followup of hypertension, abnormal stress test and hyperlipidemia.    HISTORY OF PRESENT ILLNESS:    It was my pleasure to follow up with Julia, who is a delightful, younger appearing 62-year-old  lady, nonobese, retired  in [place], lives with her .  She is a never tobacco user.      Julia is known to have pure hypercholesterolemia with very high LDL of 175 before statin therapy, family history of premature coronary artery disease (father had nonfatal myocardial infarctions starting in his 30s with subsequent stenting and mother had hypertension and hypercholesterolemia), benign essential hypertension, never tobacco user, normal BMI of 24 kg/m2.    The following issues were addressed today:    1.  Exertional chest pain and abnormal cardiovascular stress test.    I had seen Julia initially for this a few months ago.  Because of her symptoms, abnormal stress test and concerning family history, she underwent a coronary angiogram in 08/2022.  I personally reviewed the images with the patient.  She has angiographically normal coronary arteries with no visible stenosis.  Likewise, her echocardiogram confirmed normal left ventricular size and systolic function, LVEF 60%-65%, normal right ventricular systolic function, no significant valve disease.  Her EKG showed normal sinus rhythm with normal cardiac intervals.    Over the last few months, she has taken up a regular walking and a few free weights at home and has no exertional symptoms.  Chest pain has resolved.    2.  Benign essential hypertension.    At her last visit, I had started her on carvedilol 3.125 mg b.i.d. for the combined indication of hypertension, exertional chest pain and hypertensive blood pressure response on the stress test.  Her blood pressure has been excellent in the 120s over 70s, but she  has fatigue, which I think is beta blocker side effect.  Family history of hypertension in mother.  Renal panel is normal with normal electrolytes and a creatinine of 0.8.  Normal CBC.  Clinical suspicion of sleep apnea low.  Normal BMI.  Not diabetic.  Diet is not high in sodium.  No significant alcohol intake.    3.  Pure hypercholesterolemia.    For years, her LDL has been very high.  Her baseline lipid panel showed total cholesterol of 260, HDL of 70, LDL of 175, normal triglycerides of 74.  She has been on rosuvastatin 20 mg daily for 6 months, tolerating it well, no myalgias.  Her lipid panel from this morning is pending, fasting glucose is normal at 90, liver enzymes are normal.    Personal review of images and independent interpretation of tests: Labs, coronary angiogram dated 2022, echocardiogram dated 08/10/2022, ECG dated 2022.    DIAGNOSES/ASSESSMENT:  1.  Chest pain, resolved.  Angiographically normal coronary arteries in .  2.  Pure hypercholesterolemia.  Goal LDL is 100 or below.  Lipid panel pending.  For now, continue rosuvastatin 20 mg.  3.  Benign essential hypertension with fatigue as a side effect from carvedilol.  Stop carvedilol and start losartan.    PLAN:    1.  When her lipid results become available, I will review it and my office will update her.  If  or below, continue rosuvastatin 20 mg daily.  If LDL above 100, increase rosuvastatin to 40 mg daily.  2.  Stop carvedilol due to fatigue.  Allergy list updated.  3.  Start losartan 25 mg daily.  4.  Follow up with me in 6 months with fasting lipids.  If everything is stable at that time, will refer back to primary.    Established patient.  Moderate complexity medical decision making.    Sienna Alston MD        D: 2023   T: 2023   MT: wilmar    Name:     VALENTINA ERICKSON  MRN:      [id number]        Account:      [id number]   :      1960           Service Date: 2023       Document:  [id number]

## 2023-03-05 ENCOUNTER — MYC MEDICAL ADVICE (OUTPATIENT)
Dept: ENDOCRINOLOGY | Facility: CLINIC | Age: 63
End: 2023-03-05
Payer: COMMERCIAL

## 2023-03-06 NOTE — TELEPHONE ENCOUNTER
Last OV 1/31/23  Previously tried fosamax with GI SE and dizziness.  Per note;  Boniva versus switching to IV options like Reclast or Prolia.  At this time she is interested in trial of Boniva.  Plan to start Boniva 150 mg once a month.  If not tolerated then will consider switching to Reclast or Prolia.

## 2023-03-07 ENCOUNTER — TELEPHONE (OUTPATIENT)
Dept: ENDOCRINOLOGY | Facility: CLINIC | Age: 63
End: 2023-03-07
Payer: COMMERCIAL

## 2023-03-07 NOTE — TELEPHONE ENCOUNTER
Stop Boniva.  Consider Prolia or Reclast.  Please help schedule next available to discuss.  Ok for FELIPA.

## 2023-03-07 NOTE — TELEPHONE ENCOUNTER
[facility] Call Center    Phone Message    May a detailed message be left on voicemail: yes     Reason for Call: Appointment Intake    Referring Provider Name: Dr Rose  Diagnosis and/or Symptoms: Patient is experiencing adverse reaction to medication and  provider wants to see patient asap.  Scheduled for 5/23/23, by video at 9AM, which is the earliest available.  And placed on [facility]    Action Taken: Other: endo    Travel Screening: Not Applicable

## 2023-03-08 ENCOUNTER — VIRTUAL VISIT (OUTPATIENT)
Dept: ENDOCRINOLOGY | Facility: CLINIC | Age: 63
End: 2023-03-08
Payer: COMMERCIAL

## 2023-03-08 DIAGNOSIS — M81.8 IDIOPATHIC OSTEOPOROSIS: ICD-10-CM

## 2023-03-08 DIAGNOSIS — M81.0 AGE RELATED OSTEOPOROSIS, UNSPECIFIED PATHOLOGICAL FRACTURE PRESENCE: Primary | ICD-10-CM

## 2023-03-08 PROCEDURE — 99214 OFFICE O/P EST MOD 30 MIN: CPT | Mod: VID | Performed by: INTERNAL MEDICINE

## 2023-03-08 RX ORDER — EPINEPHRINE 1 MG/ML
0.3 INJECTION, SOLUTION, CONCENTRATE INTRAVENOUS EVERY 5 MIN PRN
Status: CANCELLED | OUTPATIENT
Start: 2023-03-15

## 2023-03-08 RX ORDER — METHYLPREDNISOLONE SODIUM SUCCINATE 125 MG/2ML
125 INJECTION, POWDER, LYOPHILIZED, FOR SOLUTION INTRAMUSCULAR; INTRAVENOUS
Status: CANCELLED
Start: 2023-03-15

## 2023-03-08 RX ORDER — HEPARIN SODIUM (PORCINE) LOCK FLUSH IV SOLN 100 UNIT/ML 100 UNIT/ML
5 SOLUTION INTRAVENOUS
Status: CANCELLED | OUTPATIENT
Start: 2023-03-15

## 2023-03-08 RX ORDER — HEPARIN SODIUM,PORCINE 10 UNIT/ML
5 VIAL (ML) INTRAVENOUS
Status: CANCELLED | OUTPATIENT
Start: 2023-03-15

## 2023-03-08 RX ORDER — ALBUTEROL SULFATE 90 UG/1
1-2 AEROSOL, METERED RESPIRATORY (INHALATION)
Status: CANCELLED
Start: 2023-03-15

## 2023-03-08 RX ORDER — DIPHENHYDRAMINE HYDROCHLORIDE 50 MG/ML
50 INJECTION INTRAMUSCULAR; INTRAVENOUS
Status: CANCELLED
Start: 2023-03-15

## 2023-03-08 RX ORDER — ZOLEDRONIC ACID 5 MG/100ML
5 INJECTION, SOLUTION INTRAVENOUS ONCE
Status: CANCELLED
Start: 2023-03-15

## 2023-03-08 RX ORDER — ALBUTEROL SULFATE 0.83 MG/ML
2.5 SOLUTION RESPIRATORY (INHALATION)
Status: CANCELLED | OUTPATIENT
Start: 2023-03-15

## 2023-03-08 RX ORDER — MEPERIDINE HYDROCHLORIDE 25 MG/ML
25 INJECTION INTRAMUSCULAR; INTRAVENOUS; SUBCUTANEOUS EVERY 30 MIN PRN
Status: CANCELLED | OUTPATIENT
Start: 2023-03-15

## 2023-03-08 NOTE — NURSING NOTE
Is the patient currently in the state of [place]? YES    Visit mode:VIDEO    If the visit is dropped, the patient can be reconnected by: VIDEO VISIT: Text to cell phone: [phone]    Will anyone else be joining the visit? NO      How would you like to obtain your AVS? MyChart    Are changes needed to the allergy or medication list? NO    Reason for visit: follow up

## 2023-03-08 NOTE — LETTER
"    3/8/2023         RE: Julia Key  [address] [place] [address]        Dear Colleague,    Thank you for referring your patient, Julia Key, to the [facility]. Please see a copy of my visit note below.    THIS IS A VIDEO VISIT:    Phone call visit/virtual visit encounter:    Name of patient: Julia Key    Date of encounter: 3/8/2023    Time of start of video visit: 2:32    Video started: 2:38    Video ended: 2:50    Provider location: working from home/ [facility]    Patient location: patients home.    Mode of transmission: [employer] video/ [employer]    Verbal consent: obtained before starting visit. Pt is agreeable.      The patient has been notified of following:      \"This VIDEO visit will be conducted via a call between you and your physician/provider. We have found that certain health care needs can be provided without the need for a physical exam.  This service lets us provide the care you need with a short phone conversation.  If a prescription is necessary we can send it directly to your pharmacy.  If lab work is needed we can place an order for that and you can then stop by our lab to have the test done at a later time.     With new updates with corona virus patient might be billed as clinic visit.     If during the course of the call the physician/provider feels a telephone visit is not appropriate, you will not be charged for this service.\"      Past medical history, social history, family history, allergy and medications were reviewed and updated as appropriate.  Reviewed pertinent labs, notes, imaging studies personally.      Name: Julia Key  Date: 12/12/2022  Seen for f/u of osteoporosis.     HPI:  Julia Key is a 62 year old female who presents for the evaluation of osteoporosis.   has a past medical history of Abnormal cardiovascular stress test (8/2/2022), Depression, prolonged, Hyperlipidemia, and Osteopenia.    She was diagnosed with " osteopenia few years back and then osteoporosis with DEXA 7/2022.    DEXA 2015: Osteopenia.    DEXA 7/2022:Osteoporosis. There has been probable significant decrease in bone density of the lumbar spine and of the hip(s).     RISK FACTORS:  Post-menopausal, Parent history of osteoporosis, Parent history of a hip fracture, follow up osteopenia.  Started Fosamax 12/2022 but was not able to tolerate FOSAMAX 2/2 to GI s/e like severe abdominal pain.  Trial of BONIVA 1/2023-- but had face flushing, tingling lips and nausea.    GERD: yes- under control with diet only. Not on medication.    Dental health: normal. No major dental procedures planned.    Kidney function: within normal limits    Previous treatment for osteopenia/osteoporosis: previous Fosamax for one month in 2013 (she had dizziness on it and GI problems)    Current treatment for Osteopenia/Osteoporosis: Calcium, Vitamin D    Smoke:No  Family History:Yes: mother with osteoporosis  Menstrual history/Birthing: s/p menopause. Took HRT for about 6 years. Last use was May 2020.  Fractures:No  Kidney stones: No  GI Surgery:No  Duration of therapy:  As noted above  Exercise:Yes: 4-5 times a week- 30-60 min each time.  Diet:  0-1 servings of dairy/day  Ca/Vitamin D: 1200 mg of calcium/day, 2000 international unit(s) of vit D/day  Alcohol:  No  Eating Disorder: No  Steroid Use:  No  PMH/PSH:  Past Medical History:   Diagnosis Date     Abnormal cardiovascular stress test 8/2/2022     Depression, prolonged     Cyclical related to 's health     Hyperlipidemia     One high reading late Fall 2016     Osteopenia      Past Surgical History:   Procedure Laterality Date     CV CORONARY ANGIOGRAM N/A 8/11/2022    Procedure: Coronary Angiogram;  Surgeon: Diana Smith MD;  Location:  HEART CARDIAC CATH LAB     [facility] LT SHOULDER G/E 2 VIEWS  01/01/1999 1997, 1999 - decompression, clavicle resection     Family Hx:  Family History   Problem Relation Age of Onset      Osteoporosis Mother      Dementia Mother      Hyperlipidemia Mother      Hypertension Father      Cerebrovascular Disease Father      Coronary Artery Disease Father 30     Coronary Artery Disease Brother      No Known Problems Maternal Grandmother      No Known Problems Maternal Grandfather      Myocardial Infarction Paternal Grandmother      Obesity Paternal Grandmother        Social Hx:  Social History     Socioeconomic History     Marital status:      Spouse name: Not on file     Number of children: Not on file     Years of education: Not on file     Highest education level: Not on file   Occupational History     Occupation: Retired .   Tobacco Use     Smoking status: Never     Smokeless tobacco: Never   Vaping Use     Vaping Use: Never used   Substance and Sexual Activity     Alcohol use: Yes     Comment: 2-3 drinks/month, socially     Drug use: Never     Sexual activity: Yes     Partners: Male     Birth control/protection: None   Other Topics Concern     Parent/sibling w/ CABG, MI or angioplasty before 65F 55M? 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 One daughter, one dog.     Social Determinants of Health     Financial Resource Strain: Low Risk      Difficulty of Paying Living Expenses: Not hard at all   Food Insecurity: No Food Insecurity     Worried About Running Out of Food in the Last Year: Never true     Ran Out of Food in the Last Year: Never true   Transportation Needs: No Transportation Needs     Lack of Transportation (Medical): No     Lack of Transportation (Non-Medical): No   Physical Activity: Insufficiently Active     Days of Exercise per Week: 3 days     Minutes of Exercise per Session: 30 min   Stress: No Stress Concern Present     Feeling of Stress : Not at all   Social Connections: Moderately Integrated     Frequency of Communication with Friends and Family: More than three  times a week     Frequency of Social Gatherings with Friends and Family: Once a week     Attends Sabianist Services: 1 to 4 times per year     Active Member of Clubs or Organizations: No     Attends Club or Organization Meetings: Not on file     Marital Status:    Intimate Partner Violence: Not on file   Housing Stability: Low Risk      Unable to Pay for Housing in the Last Year: No     Number of Places Lived in the Last Year: 1     Unstable Housing in the Last Year: No          MEDICATIONS:  has a current medication list which includes the following prescription(s): calcium-vitamin d, cetirizine, ibandronate, losartan, nitroglycerin, rosuvastatin, and triamcinolone.    ROS     ROS: 10 point ROS neg other than the symptoms noted above in the HPI.    Physical Exam   VS: There were no vitals taken for this visit.  GENERAL: healthy, alert and no distress  EYES: Eyes grossly normal to inspection, conjunctivae and sclerae normal  ENT: no nose swelling, nasal discharge.  Thyroid: no apparent thyroid nodules  RESP: no audible wheeze, cough, or visible cyanosis.  No visible retractions or increased work of breathing.  Able to speak fully in complete sentences.  ABDO: not evaluated.  EXTREMITIES: no hand tremors.  NEURO: Cranial nerves grossly intact, mentation intact and speech normal  SKIN: No apparent skin lesions, rash or edema seen   PSYCH: mentation appears normal, affect normal/bright, judgement and insight intact, normal speech and appearance well-groomed      LABS:  Calcium:  ENDO CALCIUM LABS-UMP Latest Ref Rng & Units 8/11/2022   CALCIUM 8.5 - 10.1 mg/dL 9.4     Creatinine:  Creatinine   Date Value Ref Range Status   02/24/2023 0.80 0.51 - 0.95 mg/dL Final   03/29/2021 0.76 0.52 - 1.04 mg/dL Final       TFTs:  Lab Results   Component Value Date    TSH 3.01 12/16/2016       PTH:  Vitamin D:  Vitamin D Deficiency Screening Results:  Lab Results   Component Value Date    VITDT 45 12/21/2022        BoneTurnOverMarkers:    DEXA 7/2022:  FINDINGS:               Lumbar Spine (L1-L4)      T-score:  -2.5, degenerative changes present               Left Femoral Neck            T-score:  -1.7               Right Femoral Neck          T-score:  -1.3                  Lumbar (L1-L4) BMD: 0.889  Previous: 1.021                          Total Hip Mean BMD: 0.832  Previous: 0.880     Comparison is made to another DXA performed on a different Lunar machine on 07/27/2015.       IMPRESSION  Osteoporosis., Degenerative changes of the lumbar spine which may falsely elevate results.     There has been probable significant decrease in bone density of the lumbar spine. There has been probable significant decrease in bone density of the hip(s).        All pertinent notes, labs, and images personally reviewed by me.     A/P  Ms.Mary ERIKA Key is a 62 year old here for the evaluation of:    Age-related osteoporosis without current pathological fracture  Comment: Risk factors for low bone density include personal history of fracture or family history, , female, Estrogen deficiency.   DEXA 7/2022:Osteoporosis. There has been probable significant decrease in bone density of the lumbar spine and of the hip(s).   Normal calcium, creatinine and vit D.  Not able to tolerate FOSAMAX and BONIVA as noted above.  Plan:   Discussed diagnosis, pathophysiology, management and treatment options of condition with pt.  Discussed other treatment options including IV options like Reclast or Prolia.  STOP BONIVA.  Start Reclast. Its once a year infusion.  Follow up in 1 year.  DEXA 7/2024 onwards.    Discussed indications, risks and benefits of all medications prescribed, and answered questions to patient's satisfaction.    The pt was advised to    Maintain an adequate calcium and vitamin D intake and to supplement vitamin D if needed to maintain serum levels of 25 hydroxy D (25 OH D) between 30-60 ng/ml.    Limit alcohol intake to no  more than 2 servings per day.    Limit caffeine intake.    Maintain an active lifestyle including weight-bearing exercises for at least 30 mins daily.    Take measures to reduce the risk of falling.    Reclast--Treatment with bisphosphonate therapy will decrease fracture risk 50-70%.   There is risk of osteonecrosis of the jaw in patients using bisphosphonates is approximately 1/1700-1/100,000, with development most likely related to invasive dental procedures. If an invasive dental procedure was necessary, preferably stop the bisphosphonate approximately 3 months prior to reduce the risk. Let your dentist know that you are on this medication.  The data available at this time suggests that there is probably a small increase risk of atypical (nontraumatic) subtrochanteric fractures of the femur in patients on bisphosphonate therapy compared to those not on it. One large study suggested that for every 100 fractures prevented with bisphosphonate therapy, less than one femur fracture will occur. Other studies suggest one episode per 2,500 patient years. Patient should call with leg pain.        All questions were answered.  The patient indicates understanding of the above issues and agrees with the plan set forth.        Follow-up:  1 year.    Yessica Rose MD  Endocrinology  [facility]  CC: Shannon Arciniega    Answers for HPI/ROS submitted by the patient on 3/7/2023  General Symptoms: No  Skin Symptoms: No  HENT Symptoms: No  EYE SYMPTOMS: No  HEART SYMPTOMS: No  LUNG SYMPTOMS: No  INTESTINAL SYMPTOMS: No  URINARY SYMPTOMS: No  GYNECOLOGIC SYMPTOMS: No  BREAST SYMPTOMS: No  SKELETAL SYMPTOMS: No  BLOOD SYMPTOMS: No  NERVOUS SYSTEM SYMPTOMS: No  MENTAL HEALTH SYMPTOMS: No          Again, thank you for allowing me to participate in the care of your patient.        Sincerely,        Yessica Rose MD

## 2023-03-08 NOTE — PROGRESS NOTES
"THIS IS A VIDEO VISIT:    Phone call visit/virtual visit encounter:    Name of patient: Julia Key    Date of encounter: 3/8/2023    Time of start of video visit: 2:32    Video started: 2:38    Video ended: 2:50    Provider location: working from home/ [facility]    Patient location: patients home.    Mode of transmission: [employer] video/ [employer]    Verbal consent: obtained before starting visit. Pt is agreeable.      The patient has been notified of following:      \"This VIDEO visit will be conducted via a call between you and your physician/provider. We have found that certain health care needs can be provided without the need for a physical exam.  This service lets us provide the care you need with a short phone conversation.  If a prescription is necessary we can send it directly to your pharmacy.  If lab work is needed we can place an order for that and you can then stop by our lab to have the test done at a later time.     With new updates with corona virus patient might be billed as clinic visit.     If during the course of the call the physician/provider feels a telephone visit is not appropriate, you will not be charged for this service.\"      Past medical history, social history, family history, allergy and medications were reviewed and updated as appropriate.  Reviewed pertinent labs, notes, imaging studies personally.      Name: Julia Key  Date: 12/12/2022  Seen for f/u of osteoporosis.     HPI:  Julia Key is a 62 year old female who presents for the evaluation of osteoporosis.   has a past medical history of Abnormal cardiovascular stress test (8/2/2022), Depression, prolonged, Hyperlipidemia, and Osteopenia.    She was diagnosed with osteopenia few years back and then osteoporosis with DEXA 7/2022.    DEXA 2015: Osteopenia.    DEXA 7/2022:Osteoporosis. There has been probable significant decrease in bone density of the lumbar spine and of the hip(s).     RISK FACTORS: "  Post-menopausal, Parent history of osteoporosis, Parent history of a hip fracture, follow up osteopenia.  Started Fosamax 12/2022 but was not able to tolerate FOSAMAX 2/2 to GI s/e like severe abdominal pain.  Trial of BONIVA 1/2023-- but had face flushing, tingling lips and nausea.    GERD: yes- under control with diet only. Not on medication.    Dental health: normal. No major dental procedures planned.    Kidney function: within normal limits    Previous treatment for osteopenia/osteoporosis: previous Fosamax for one month in 2013 (she had dizziness on it and GI problems)    Current treatment for Osteopenia/Osteoporosis: Calcium, Vitamin D    Smoke:No  Family History:Yes: mother with osteoporosis  Menstrual history/Birthing: s/p menopause. Took HRT for about 6 years. Last use was May 2020.  Fractures:No  Kidney stones: No  GI Surgery:No  Duration of therapy:  As noted above  Exercise:Yes: 4-5 times a week- 30-60 min each time.  Diet:  0-1 servings of dairy/day  Ca/Vitamin D: 1200 mg of calcium/day, 2000 international unit(s) of vit D/day  Alcohol:  No  Eating Disorder: No  Steroid Use:  No  PMH/PSH:  Past Medical History:   Diagnosis Date     Abnormal cardiovascular stress test 8/2/2022     Depression, prolonged     Cyclical related to 's health     Hyperlipidemia     One high reading late Fall 2016     Osteopenia      Past Surgical History:   Procedure Laterality Date     CV CORONARY ANGIOGRAM N/A 8/11/2022    Procedure: Coronary Angiogram;  Surgeon: Diana Smith MD;  Location:  HEART CARDIAC CATH LAB     [facility] SHOULDER G/E 2 VIEWS  01/01/1999 1997, 1999 - decompression, clavicle resection     Family Hx:  Family History   Problem Relation Age of Onset     Osteoporosis Mother      Dementia Mother      Hyperlipidemia Mother      Hypertension Father      Cerebrovascular Disease Father      Coronary Artery Disease Father 30     Coronary Artery Disease Brother      No Known Problems Maternal  Grandmother      No Known Problems Maternal Grandfather      Myocardial Infarction Paternal Grandmother      Obesity Paternal Grandmother        Social Hx:  Social History     Socioeconomic History     Marital status:      Spouse name: Not on file     Number of children: Not on file     Years of education: Not on file     Highest education level: Not on file   Occupational History     Occupation: Retired .   Tobacco Use     Smoking status: Never     Smokeless tobacco: Never   Vaping Use     Vaping Use: Never used   Substance and Sexual Activity     Alcohol use: Yes     Comment: 2-3 drinks/month, socially     Drug use: Never     Sexual activity: Yes     Partners: Male     Birth control/protection: None   Other Topics Concern     Parent/sibling w/ CABG, MI or angioplasty before 65F 55M? 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 One daughter, one dog.     Social Determinants of Health     Financial Resource Strain: Low Risk      Difficulty of Paying Living Expenses: Not hard at all   Food Insecurity: No Food Insecurity     Worried About Running Out of Food in the Last Year: Never true     Ran Out of Food in the Last Year: Never true   Transportation Needs: No Transportation Needs     Lack of Transportation (Medical): No     Lack of Transportation (Non-Medical): No   Physical Activity: Insufficiently Active     Days of Exercise per Week: 3 days     Minutes of Exercise per Session: 30 min   Stress: No Stress Concern Present     Feeling of Stress : Not at all   Social Connections: Moderately Integrated     Frequency of Communication with Friends and Family: More than three times a week     Frequency of Social Gatherings with Friends and Family: Once a week     Attends Hoahaoism Services: 1 to 4 times per year     Active Member of Clubs or Organizations: No     Attends Club or Organization Meetings: Not on  file     Marital Status:    Intimate Partner Violence: Not on file   Housing Stability: Low Risk      Unable to Pay for Housing in the Last Year: No     Number of Places Lived in the Last Year: 1     Unstable Housing in the Last Year: No          MEDICATIONS:  has a current medication list which includes the following prescription(s): calcium-vitamin d, cetirizine, ibandronate, losartan, nitroglycerin, rosuvastatin, and triamcinolone.    ROS     ROS: 10 point ROS neg other than the symptoms noted above in the HPI.    Physical Exam   VS: There were no vitals taken for this visit.  GENERAL: healthy, alert and no distress  EYES: Eyes grossly normal to inspection, conjunctivae and sclerae normal  ENT: no nose swelling, nasal discharge.  Thyroid: no apparent thyroid nodules  RESP: no audible wheeze, cough, or visible cyanosis.  No visible retractions or increased work of breathing.  Able to speak fully in complete sentences.  ABDO: not evaluated.  EXTREMITIES: no hand tremors.  NEURO: Cranial nerves grossly intact, mentation intact and speech normal  SKIN: No apparent skin lesions, rash or edema seen   PSYCH: mentation appears normal, affect normal/bright, judgement and insight intact, normal speech and appearance well-groomed      LABS:  Calcium:  ENDO CALCIUM LABS-UMP Latest Ref Rng & Units 8/11/2022   CALCIUM 8.5 - 10.1 mg/dL 9.4     Creatinine:  Creatinine   Date Value Ref Range Status   02/24/2023 0.80 0.51 - 0.95 mg/dL Final   03/29/2021 0.76 0.52 - 1.04 mg/dL Final       TFTs:  Lab Results   Component Value Date    TSH 3.01 12/16/2016       PTH:  Vitamin D:  Vitamin D Deficiency Screening Results:  Lab Results   Component Value Date    VITDT 45 12/21/2022       BoneTurnOverMarkers:    DEXA 7/2022:  FINDINGS:               Lumbar Spine (L1-L4)      T-score:  -2.5, degenerative changes present               Left Femoral Neck            T-score:  -1.7               Right Femoral Neck          T-score:   -1.3                  Lumbar (L1-L4) BMD: 0.889  Previous: 1.021                          Total Hip Mean BMD: 0.832  Previous: 0.880     Comparison is made to another DXA performed on a different Lunar machine on 07/27/2015.       IMPRESSION  Osteoporosis., Degenerative changes of the lumbar spine which may falsely elevate results.     There has been probable significant decrease in bone density of the lumbar spine. There has been probable significant decrease in bone density of the hip(s).        All pertinent notes, labs, and images personally reviewed by me.     A/P  Ms.Mary ERIKA Key is a 62 year old here for the evaluation of:    Age-related osteoporosis without current pathological fracture  Comment: Risk factors for low bone density include personal history of fracture or family history, , female, Estrogen deficiency.   DEXA 7/2022:Osteoporosis. There has been probable significant decrease in bone density of the lumbar spine and of the hip(s).   Normal calcium, creatinine and vit D.  Not able to tolerate FOSAMAX and BONIVA as noted above.  Plan:   Discussed diagnosis, pathophysiology, management and treatment options of condition with pt.  Discussed other treatment options including IV options like Reclast or Prolia.  STOP BONIVA.  Start Reclast. Its once a year infusion.  Follow up in 1 year.  DEXA 7/2024 onwards.    Discussed indications, risks and benefits of all medications prescribed, and answered questions to patient's satisfaction.    The pt was advised to    Maintain an adequate calcium and vitamin D intake and to supplement vitamin D if needed to maintain serum levels of 25 hydroxy D (25 OH D) between 30-60 ng/ml.    Limit alcohol intake to no more than 2 servings per day.    Limit caffeine intake.    Maintain an active lifestyle including weight-bearing exercises for at least 30 mins daily.    Take measures to reduce the risk of falling.    Reclast--Treatment with bisphosphonate therapy  will decrease fracture risk 50-70%.   There is risk of osteonecrosis of the jaw in patients using bisphosphonates is approximately 1/1700-1/100,000, with development most likely related to invasive dental procedures. If an invasive dental procedure was necessary, preferably stop the bisphosphonate approximately 3 months prior to reduce the risk. Let your dentist know that you are on this medication.  The data available at this time suggests that there is probably a small increase risk of atypical (nontraumatic) subtrochanteric fractures of the femur in patients on bisphosphonate therapy compared to those not on it. One large study suggested that for every 100 fractures prevented with bisphosphonate therapy, less than one femur fracture will occur. Other studies suggest one episode per 2,500 patient years. Patient should call with leg pain.        All questions were answered.  The patient indicates understanding of the above issues and agrees with the plan set forth.        Follow-up:  1 year.    Yessica Rose MD  Endocrinology  [facility]  CC: Shannon Arciniega    Answers for HPI/ROS submitted by the patient on 3/7/2023  General Symptoms: No  Skin Symptoms: No  HENT Symptoms: No  EYE SYMPTOMS: No  HEART SYMPTOMS: No  LUNG SYMPTOMS: No  INTESTINAL SYMPTOMS: No  URINARY SYMPTOMS: No  GYNECOLOGIC SYMPTOMS: No  BREAST SYMPTOMS: No  SKELETAL SYMPTOMS: No  BLOOD SYMPTOMS: No  NERVOUS SYSTEM SYMPTOMS: No  MENTAL HEALTH SYMPTOMS: No

## 2023-03-08 NOTE — PATIENT INSTRUCTIONS
-[facility]  Dr Rose, Endocrinology Department    [facility]   [address] [facility]  [place] [address]  Appointment [phone]  Fax: [phone]  [place]: Monday - Thursday      STOP BONIVA.  Start Reclast. Its once a year infusion.  Follow up in 1 year.  DEXA 2024 onwards.    Reclast-- Treatment with bisphosphonate therapy will decrease fracture risk 50-70%.   There is risk of osteonecrosis of the jaw in patients using bisphosphonates is approximately 1700-1/100,000, with development most likely related to invasive dental procedures. If an invasive dental procedure was necessary, preferably stop the bisphosphonate approximately 3 months prior to reduce the risk. Let your dentist know that you are on this medication.  The data available at this time suggests that there is probably a small increase risk of atypical (nontraumatic) subtrochanteric fractures of the femur in patients on bisphosphonate therapy compared to those not on it. One large study suggested that for every 100 fractures prevented with bisphosphonate therapy, less than one femur fracture will occur. Other studies suggest one episode per 2,500 patient years. Patient should call with leg pain.      Infusion center- [facility].                [phone]   Infusion center- [facility].                [phone]     Please call infusion center to schedule the treatment/ test discussed.    The pt was advised to  Maintain an adequate calcium and vitamin D intake and to supplement vitamin D if needed to maintain serum levels of 25 hydroxy D (25 OH D) between 30-60 ng/ml.  Limit alcohol intake to no more than 2 servings per day.  Limit caffeine intake.  Maintain an active lifestyle including weight-bearing exercises for at least 30 mins daily.  Take measures to reduce the risk of falling.    You should get 1000- 1200 mg/day calcium in divided doses of no  more than 500 mg/dose.  This INCLUDES what is in your food as well as what is in any supplements you may be taking.    Vit D about 800-1000 IU/day ( unless you have vit D deficiency- in that case higher dose)  Dietary sources of calcium:: These also contain vitamin D  Milk                            8 oz            300 mg calcium  Yogurt                          1 cup           400 mg calcium   Hard cheese                     1.5 oz          300 mg  Cottage cheese                  2 cup           300 mg  Orange juice with Calcium       8 oz            300 mg  Low fat dairy sources are recommended      You should get 30 minutes of moderate weight bearing exercise on most days of the week .  Weight bearing exercise includes such things as walking, jogging, hiking, dancing.  You should also get Strength training 2 or more times/week in addition to other weight -being exercise. Strength training uses weight or resistance beyond that seen in everyday activities -(pilates, weight training with free weights, weight machines or resistance bands)    Living with Osteoporosis: Preventing Fractures  If you have osteoporosis, you can do a lot to reduce its effect on your life. Knowing how to prevent fractures and spinal curvature can help you live more comfortably and safely with this disease.  Reducing your risk for fractures  The most common fracture sites in people with osteoporosis are the wrist, spine, and hip. These fractures are often caused by accidents and falls. All fractures are painful and may limit what you can do. But hip fractures are very serious. They often need surgery, and it can take months to recover. To reduce your risk for fractures:  Get regular exercise. Try walking, swimming, or weight training.  Eat foods that are rich in calcium, or take calcium supplements.  Make your home safe to help avoid accidents.  Take extra precautions not to fall in risky areas, such as icy sidewalks.  Understanding spinal  fractures  Your spine is made up of many bones called vertebrae. Osteoporosis can cause the vertebrae in your spine to collapse. As a result, your upper back may arch forward, creating a curvature. Spine fractures may also result from back strain and bad posture. You will also lose height. Your lower spine must then adjust to keep your body balanced. This can cause back pain. To prevent or lessen these spinal changes:  Practice good posture.  Use proper techniques if you need to lift heavy objects.  Do back exercises to help your posture.  Lie on your back when you have pain.  Ask your healthcare provider about these and other ways to help your spine.  [employer] last reviewed this educational content on 5/1/2018 2000-2021 [employer]. All rights reserved. This information is not intended as a substitute for professional medical care. Always follow your healthcare professional's instructions.          Living with Osteoporosis: Regular Exercise  If you have osteoporosis, exercise is vital for your health. It can prevent bone fractures and spine changes. It will slow bone loss. Exercise will strengthen your body. It can also be fun. A variety of exercises is best. See below for exercises that can help you. But before you start, talk with your healthcare provider to be sure these exercises are right for you.   Resistance exercises. These build muscle strength and maintain bone mass. They also make you less prone to injury. Exercises include lifting small weights, doing push-ups and sit-ups, using elastic exercise bands, and using weight machines.   Weight-bearing activities. These help your whole body. They also help you maintain bone mass. Activities include walking, dancing, and housework.   Non-weight-bearing exercises. These help prevent back strain and pain. They do this by building the trunk and leg muscles. Exercises that help with flexibility can prevent falls. Examples include swimming, water  exercise, and stretching.   Staying safe  Here are tips to stay safe:   Always check with your healthcare provider before starting any new exercise program.  Use weights only as instructed.  Stop any exercise that causes pain.  [employer] last reviewed this educational content on 5/1/2018 2000-2021 [employer]. All rights reserved. This information is not intended as a substitute for professional medical care. Always follow your healthcare professional's instructions.          Preventing Osteoporosis: Avoiding Bone Loss  Certain factors can speed up bone loss or decrease bone growth. For example, alcohol, cigarettes, and certain medicines reduce bone mass. Some foods make it hard for your body to absorb calcium.  Things to avoid  Here are things to avoid to help prevent osteoporosis:  Alcohol. This is toxic to bones. It is a major cause of bone loss. Heavy drinking can cause osteoporosis even if you have no other risk factors.  Smoking. This reduces bone mass. Smoking may also interfere with estrogen levels and cause early menopause.  Inactivity. Not being active makes your bones lose strength and become thinner. Over time, thin bones may break. Women who aren't active are at a high risk for osteoporosis.  Certain medicines. Some medicines, such as cortisone, increase bone loss. They also decrease bone growth. Ask your healthcare provider about any side effects of your medicines, and how to prevent them.  Protein-rich or salty foods. Eaten in large amounts, these foods may deplete calcium.  Caffeine. This increases calcium loss. People who drink a lot of coffee, tea, or soda lose more calcium than those who don't.  [employer] last reviewed this educational content on 5/1/2018 2000-2021 [employer]. All rights reserved. This information is not intended as a substitute for professional medical care. Always follow your healthcare professional's instructions.          Preventing  Osteoporosis: Meeting Your Calcium Needs  Your body needs calcium to build and repair bones. But it can't make calcium on its own. That's why it's important to eat calcium-rich foods. Some foods are naturally rich in calcium. Others have calcium added (fortified). It's best to get calcium from the foods you eat. But if you can't get enough, you may want to take calcium supplements. To meet your daily calcium needs, try the foods listed below.               Dairy Fish & beans Other sources   Source   Calcium (mg) per serving   Source   Calcium (mg) per serving   Source   Calcium (mg) per serving   Low-fat yogurt, plain   415 mg/8 oz.   Sardines, [place], canned, with bones   351 mg/3 oz.   Oatmeal, instant, fortified   215 mg/1 cup   Nonfat milk   302 mg/1 cup   [place], sockeye, canned, with bones   239 mg/3 oz.   Tofu made with calcium sulfate   204 mg/3 oz.   Low-fat milk   297 mg/1 cup   Soybeans, fresh, boiled   131 mg/1/2 cup   Collards   179 mg/1/2 cup   Swiss cheese   272 mg/1 oz.   White beans, cooked   81 mg/1/2 cup   English muffin, whole wheat   175 mg/1 muffin   Cheddar cheese   205 mg/1 oz.   Navy beans, cooked   79 mg/1/2 cup   Kale   90 mg/1/2 cup   Ice cream strawberry   79 mg/1/2 cup           Orange, navel   56 mg/1 medium   Note: Calcium levels may vary depending on brand and size.  Daily calcium needs  14 to 18 years old: 1,300 mg  19 to 30 years old: 1,000 mg  31 to 50 years old: 1,000 mg  51 to 70 years old, women: 1,200 mg  51 to 70 years old, men: 1,000 mg  Pregnant or nursin to 18 years old: 1,300 mg, 19 to 50 years old: 1,000 mg  Older than 70 (women and men): 1,200 mg   [employer] last reviewed this educational content on 2018-2021 [employer]. All rights reserved. This information is not intended as a substitute for professional medical care. Always follow your healthcare professional's instructions.

## 2023-03-16 ENCOUNTER — INFUSION THERAPY VISIT (OUTPATIENT)
Dept: INFUSION THERAPY | Facility: CLINIC | Age: 63
End: 2023-03-16
Attending: INTERNAL MEDICINE
Payer: COMMERCIAL

## 2023-03-16 VITALS
SYSTOLIC BLOOD PRESSURE: 134 MMHG | HEART RATE: 85 BPM | DIASTOLIC BLOOD PRESSURE: 82 MMHG | RESPIRATION RATE: 18 BRPM | OXYGEN SATURATION: 98 %

## 2023-03-16 DIAGNOSIS — M81.8 IDIOPATHIC OSTEOPOROSIS: ICD-10-CM

## 2023-03-16 DIAGNOSIS — M81.0 AGE RELATED OSTEOPOROSIS, UNSPECIFIED PATHOLOGICAL FRACTURE PRESENCE: ICD-10-CM

## 2023-03-16 RX ORDER — METHYLPREDNISOLONE SODIUM SUCCINATE 125 MG/2ML
125 INJECTION, POWDER, LYOPHILIZED, FOR SOLUTION INTRAMUSCULAR; INTRAVENOUS
Status: CANCELLED
Start: 2023-03-16

## 2023-03-16 RX ORDER — EPINEPHRINE 1 MG/ML
0.3 INJECTION, SOLUTION INTRAMUSCULAR; SUBCUTANEOUS EVERY 5 MIN PRN
Status: CANCELLED | OUTPATIENT
Start: 2023-03-16

## 2023-03-16 RX ORDER — HEPARIN SODIUM,PORCINE 10 UNIT/ML
5 VIAL (ML) INTRAVENOUS
Status: CANCELLED | OUTPATIENT
Start: 2023-03-16

## 2023-03-16 RX ORDER — ALBUTEROL SULFATE 0.83 MG/ML
2.5 SOLUTION RESPIRATORY (INHALATION)
Status: CANCELLED | OUTPATIENT
Start: 2023-03-16

## 2023-03-16 RX ORDER — ZOLEDRONIC ACID 5 MG/100ML
5 INJECTION, SOLUTION INTRAVENOUS ONCE
Status: CANCELLED
Start: 2023-03-16

## 2023-03-16 RX ORDER — DIPHENHYDRAMINE HYDROCHLORIDE 50 MG/ML
50 INJECTION INTRAMUSCULAR; INTRAVENOUS
Status: CANCELLED
Start: 2023-03-16

## 2023-03-16 RX ORDER — MEPERIDINE HYDROCHLORIDE 25 MG/ML
25 INJECTION INTRAMUSCULAR; INTRAVENOUS; SUBCUTANEOUS EVERY 30 MIN PRN
Status: CANCELLED | OUTPATIENT
Start: 2023-03-16

## 2023-03-16 RX ORDER — ALBUTEROL SULFATE 90 UG/1
1-2 AEROSOL, METERED RESPIRATORY (INHALATION)
Status: CANCELLED
Start: 2023-03-16

## 2023-03-16 RX ORDER — HEPARIN SODIUM (PORCINE) LOCK FLUSH IV SOLN 100 UNIT/ML 100 UNIT/ML
5 SOLUTION INTRAVENOUS
Status: CANCELLED | OUTPATIENT
Start: 2023-03-16

## 2023-03-16 NOTE — PROGRESS NOTES
Chief Complaint   Patient presents with     Infusion     IV Reclast       Patient arrived for IV Reclast infusion. Medication allergy list reviewed. Patient reported throat swelling/closing with Fosamax and hives and tingling of lips with Boniva (not previously on allergy list). Allergies reviewed with pharmacy team as Reclast in same family. Determined reaction (unknown to extent) would be probable. No premedications ordered. Unable to contact provider- no pager number listed and no answer to phone numbers listed. Above discussed with clinic director and patient. Due to concern of safety, patient very understanding and in agreement to reschedule appointment after clarification of orders with provider.     Staff message sent to ordering provider and director.

## 2023-03-22 ENCOUNTER — TELEPHONE (OUTPATIENT)
Dept: ENDOCRINOLOGY | Facility: CLINIC | Age: 63
End: 2023-03-22
Payer: COMMERCIAL

## 2023-03-22 NOTE — TELEPHONE ENCOUNTER
Please see note from infusion center.  Please schedule patient for follow-up visit to discuss other options.  Video visit okay.    OK to book in next available open FELIPA slot. (1/2)  Please note that there are 2 FELIPA slots/day.  Please make sure that 1 FELIPA slot is available for urgent needs.  Let me know if you have any questions.

## 2023-03-22 NOTE — TELEPHONE ENCOUNTER
----- Message from Hansa Anne RN sent at 3/22/2023 12:40 PM CDT -----  Regarding: FW: Reclast plan  Robert Rose,  Just circling back on this.  Are you able to provide a plan of care for this pt?  Pt is allergic to fosamax as well as boniva.  Reclast is in the same drug family.    Did you want to go ahead with the reclast or did you want to switch to another product.    Thank you,    Hansa Anne RN   [facility]  [facility]  [phone]    ----- Message -----  From: Linda Gracia RN  Sent: 3/22/2023  12:37 PM CDT  To: Hansa Anne RN  Subject: FW: Reclast plan                                   ----- Message -----  From: Cici Reagan RN  Sent: 3/16/2023   4:26 PM CDT  To: Hansa Anne RN, Linda Gracia RN, #  Subject: Reclast plan                                     Dr. Rose,     Please see note from today.   Patient was scheduled for Reclast this afternoon. After reviewing her medication allergy list with pharmacy and our director, we ultimately cancelled her infusion today. Due to her reactions to Boniva and Fosamax, we felt it safest to reschedule and discuss plan moving forward.     Could we please get your pager number going forward as it is not listed in the directory.       Thank you,   Chasity  [facility]

## 2023-03-29 ENCOUNTER — TELEPHONE (OUTPATIENT)
Dept: ENDOCRINOLOGY | Facility: CLINIC | Age: 63
End: 2023-03-29
Payer: COMMERCIAL

## 2023-03-29 NOTE — TELEPHONE ENCOUNTER
----- Message from Yessica Rose MD sent at 3/28/2023  4:55 PM CDT -----  Regarding: RE: Reclast plan  Yes- that is OK.    ----- Message -----  From: Hansa Anne RN  Sent: 3/24/2023  12:18 PM CDT  To: Yessica Rose MD  Subject: RE: Reclast plan                                 Can I take the reclast plan out so it comes out of the schedulers workque to call the pt ?    Thank you,    Hansa Anne RN   [facility]  [facility]  [phone]    ----- Message -----  From: Yessica Rose MD  Sent: 3/22/2023   1:39 PM CDT  To: Hansa Anne RN  Subject: RE: Reclast plan                                 Thank you for note.  I am asking my staff to contact patient and help schedule visit to discuss other medication options.  Let me know if you have any questions.    Yessica Rose MD    ----- Message -----  From: Hansa Anne RN  Sent: 3/22/2023  12:57 PM CDT  To: Linda Gracia RN, Cici Reagan RN, #  Subject: FW: Reclast plan                                 Hi Dr. Rose,  Just circling back on this.  Are you able to provide a plan of care for this pt?  Pt is allergic to fosamax as well as boniva.  Reclast is in the same drug family.    Did you want to go ahead with the reclast or did you want to switch to another product.    Thank you,    Hansa Anne RN   [facility]  [facility]  [phone]    ----- Message -----  From: Linda Gracia RN  Sent: 3/22/2023  12:37 PM CDT  To: Hansa Anne RN  Subject: FW: Reclast plan                                   ----- Message -----  From: Cici Reagan RN  Sent: 3/16/2023   4:26 PM CDT  To: Hansa Anne RN, Linda Gracia RN, #  Subject: Reclast plan                                     Dr. Rose,     Please see note from today.   Patient was scheduled for Reclast this afternoon. After reviewing her  medication allergy list with pharmacy and our director, we ultimately cancelled her infusion today. Due to her reactions to Boniva and Fosamax, we felt it safest to reschedule and discuss plan moving forward.     Could we please get your pager number going forward as it is not listed in the directory.       Thank you,   Chasity PERRY

## 2023-04-25 ENCOUNTER — VIRTUAL VISIT (OUTPATIENT)
Dept: ENDOCRINOLOGY | Facility: CLINIC | Age: 63
End: 2023-04-25
Payer: COMMERCIAL

## 2023-04-25 DIAGNOSIS — M81.0 AGE RELATED OSTEOPOROSIS, UNSPECIFIED PATHOLOGICAL FRACTURE PRESENCE: ICD-10-CM

## 2023-04-25 DIAGNOSIS — M81.8 IDIOPATHIC OSTEOPOROSIS: Primary | ICD-10-CM

## 2023-04-25 PROCEDURE — 99213 OFFICE O/P EST LOW 20 MIN: CPT | Mod: VID | Performed by: INTERNAL MEDICINE

## 2023-04-25 NOTE — PROGRESS NOTES
"THIS IS A VIDEO VISIT:    Phone call visit/virtual visit encounter:    Name of patient: Julia Key    Date of encounter: 4/25/2023    Time of start of video visit: 8:41    Video started: 8:45    Video ended: 8:55    Provider location: working from home/ [facility]    Patient location: patients home.    Mode of transmission: [place] video/ [employer]    Verbal consent: obtained before starting visit. Pt is agreeable.      The patient has been notified of following:      \"This VIDEO visit will be conducted via a call between you and your physician/provider. We have found that certain health care needs can be provided without the need for a physical exam.  This service lets us provide the care you need with a short phone conversation.  If a prescription is necessary we can send it directly to your pharmacy.  If lab work is needed we can place an order for that and you can then stop by our lab to have the test done at a later time.     With new updates with corona virus patient might be billed as clinic visit.     If during the course of the call the physician/provider feels a telephone visit is not appropriate, you will not be charged for this service.\"      Past medical history, social history, family history, allergy and medications were reviewed and updated as appropriate.  Reviewed pertinent labs, notes, imaging studies personally.      Name: Julia Key  Date: 4/25/2023  Seen for f/u of osteoporosis.     HPI:  Julia Key is a 62 year old female who presents for the evaluation of osteoporosis.   has a past medical history of Abnormal cardiovascular stress test (8/2/2022), Depression, prolonged, Hyperlipidemia, Idiopathic osteoporosis (3/8/2023), and Osteopenia.    She was diagnosed with osteopenia few years back and then osteoporosis with DEXA 7/2022.    DEXA 2015: Osteopenia.    DEXA 7/2022:Osteoporosis. There has been probable significant decrease in bone density of the lumbar spine and of the " hip(s).     RISK FACTORS:  Post-menopausal, Parent history of osteoporosis, Parent history of a hip fracture, follow up osteopenia.  Started Fosamax 12/2022 but was not able to tolerate FOSAMAX 2/2 to GI s/e like severe abdominal pain.  Trial of BONIVA 1/2023-- but had face flushing, tingling lips and nausea.+ skin reaction.  Plan was for reclast but it was discontinued in the setting of above.  Here to discuss next options.    GERD: yes- under control with diet only. Not on medication.    Dental health: normal. No major dental procedures planned.    Kidney function: within normal limits    Previous treatment for osteopenia/osteoporosis: previous Fosamax for one month in 2013 (she had dizziness on it and GI problems)    Current treatment for Osteopenia/Osteoporosis: Calcium, Vitamin D    Smoke:No  Family History:Yes: mother with osteoporosis  Menstrual history/Birthing: s/p menopause. Took HRT for about 6 years. Last use was May 2020.  Fractures:No  Kidney stones: No  GI Surgery:No  Duration of therapy:  As noted above  Exercise:Yes: 4-5 times a week- 30-60 min each time.  Diet:  0-1 servings of dairy/day  Ca/Vitamin D: 1200 mg of calcium/day, 2000 international unit(s) of vit D/day  Alcohol:  No  Eating Disorder: No  Steroid Use:  No  PMH/PSH:  Past Medical History:   Diagnosis Date     Abnormal cardiovascular stress test 8/2/2022     Depression, prolonged     Cyclical related to 's health     Hyperlipidemia     One high reading late Fall 2016     Idiopathic osteoporosis 3/8/2023     Osteopenia      Past Surgical History:   Procedure Laterality Date     CV CORONARY ANGIOGRAM N/A 8/11/2022    Procedure: Coronary Angiogram;  Surgeon: Diana Smith MD;  Location:  HE[facility] CATH LAB     [facility] LT SHOULDER G/E 2 VIEWS  01/01/1999 1997, 1999 - decompression, clavicle resection     Family Hx:  Family History   Problem Relation Age of Onset     Osteoporosis Mother      Dementia Mother      Hyperlipidemia  Mother      Hypertension Father      Cerebrovascular Disease Father      Coronary Artery Disease Father 30     Coronary Artery Disease Brother      No Known Problems Maternal Grandmother      No Known Problems Maternal Grandfather      Myocardial Infarction Paternal Grandmother      Obesity Paternal Grandmother        Social Hx:  Social History     Socioeconomic History     Marital status:      Spouse name: Not on file     Number of children: Not on file     Years of education: Not on file     Highest education level: Not on file   Occupational History     Occupation: Retired .   Tobacco Use     Smoking status: Never     Smokeless tobacco: Never   Vaping Use     Vaping status: Never Used   Substance and Sexual Activity     Alcohol use: Yes     Comment: 2-3 drinks/month, socially     Drug use: Never     Sexual activity: Yes     Partners: Male     Birth control/protection: None   Other Topics Concern     Parent/sibling w/ CABG, MI or angioplasty before 65F 55M? Not Asked   Social History Narrative    Retired in June 2020, was working as a [employer].   since 1989. One daughter, one dog.     Social Determinants of Health     Financial Resource Strain: Low Risk  (7/20/2022)    Overall Financial Resource Strain (CARDIA)      Difficulty of Paying Living Expenses: Not hard at all   Food Insecurity: No Food Insecurity (7/20/2022)    Hunger Vital Sign      Worried About Running Out of Food in the Last Year: Never true      Ran Out of Food in the Last Year: Never true   Transportation Needs: No Transportation Needs (7/20/2022)    PRAPARE - Transportation      Lack of Transportation (Medical): No      Lack of Transportation (Non-Medical): No   Physical Activity: Insufficiently Active (7/20/2022)    Exercise Vital Sign      Days of Exercise per Week: 3 days      Minutes of Exercise per Session: 30 min   Stress: No Stress Concern Present (7/20/2022)    [place] [employer]  Occupational Health - Occupational Stress Questionnaire      Feeling of Stress : Not at all   Social Connections: Moderately Integrated (7/20/2022)    Social Connection and Isolation Panel [NHANES]      Frequency of Communication with Friends and Family: More than three times a week      Frequency of Social Gatherings with Friends and Family: Once a week      Attends Shinto Services: 1 to 4 times per year      Active Member of Clubs or Organizations: No      Attends Club or Organization Meetings: Not on file      Marital Status:    Intimate Partner Violence: Not on file   Housing Stability: Low Risk  (7/20/2022)    Housing Stability Vital Sign      Unable to Pay for Housing in the Last Year: No      Number of Places Lived in the Last Year: 1      Unstable Housing in the Last Year: No          MEDICATIONS:  has a current medication list which includes the following prescription(s): calcium-vitamin d, cetirizine, losartan, nitroglycerin, rosuvastatin, and triamcinolone.    ROS     ROS: 10 point ROS neg other than the symptoms noted above in the HPI.    Physical Exam   VS: There were no vitals taken for this visit.  GENERAL: healthy, alert and no distress  EYES: Eyes grossly normal to inspection, conjunctivae and sclerae normal  ENT: no nose swelling, nasal discharge.  Thyroid: no apparent thyroid nodules  RESP: no audible wheeze, cough, or visible cyanosis.  No visible retractions or increased work of breathing.  Able to speak fully in complete sentences.  ABDO: not evaluated.  EXTREMITIES: no hand tremors.  NEURO: Cranial nerves grossly intact, mentation intact and speech normal  SKIN: No apparent skin lesions, rash or edema seen   PSYCH: mentation appears normal, affect normal/bright, judgement and insight intact, normal speech and appearance well-groomed      LABS:  Calcium:  ENDO CALCIUM LABS-[facility] Latest Ref Rng & Units 8/11/2022   CALCIUM 8.5 - 10.1 mg/dL 9.4     Creatinine:  Creatinine   Date Value Ref  Range Status   02/24/2023 0.80 0.51 - 0.95 mg/dL Final   03/29/2021 0.76 0.52 - 1.04 mg/dL Final       TFTs:  Lab Results   Component Value Date    TSH 3.01 12/16/2016       PTH:  Vitamin D:  Vitamin D Deficiency Screening Results:  Lab Results   Component Value Date    VITDT 45 12/21/2022       BoneTurnOverMarkers:    DEXA 7/2022:  FINDINGS:               Lumbar Spine (L1-L4)      T-score:  -2.5, degenerative changes present               Left Femoral Neck            T-score:  -1.7               Right Femoral Neck          T-score:  -1.3                  Lumbar (L1-L4) BMD: 0.889  Previous: 1.021                          Total Hip Mean BMD: 0.832  Previous: 0.880     Comparison is made to another DXA performed on a different NGRAIN machine on 07/27/2015.       IMPRESSION  Osteoporosis., Degenerative changes of the lumbar spine which may falsely elevate results.     There has been probable significant decrease in bone density of the lumbar spine. There has been probable significant decrease in bone density of the hip(s).        All pertinent notes, labs, and images personally reviewed by me.     A/P  Ms.Mary ERIKA Key is a 62 year old here for the evaluation of:    Age-related osteoporosis without current pathological fracture  Comment: Risk factors for low bone density include personal history of fracture or family history, , female, Estrogen deficiency.   DEXA 7/2022:Osteoporosis. There has been probable significant decrease in bone density of the lumbar spine and of the hip(s).   Normal calcium, creatinine and vit D.  Not able to tolerate FOSAMAX and BONIVA as noted above.  Plan:   Discussed diagnosis, pathophysiology, management and treatment options of condition with pt.  Plan for PROLIA--5/2023, 11/2023.  Follow up in 1 year.  DEXA in 7/2024 onwards.    Discussed indications, risks and benefits of all medications prescribed, and answered questions to patient's satisfaction.    The pt was advised  to    Maintain an adequate calcium and vitamin D intake and to supplement vitamin D if needed to maintain serum levels of 25 hydroxy D (25 OH D) between 30-60 ng/ml.    Limit alcohol intake to no more than 2 servings per day.    Limit caffeine intake.    Maintain an active lifestyle including weight-bearing exercises for at least 30 mins daily.    Take measures to reduce the risk of falling.    Reclast--Treatment with bisphosphonate therapy will decrease fracture risk 50-70%.   There is risk of osteonecrosis of the jaw in patients using bisphosphonates is approximately 1/1700-1/100,000, with development most likely related to invasive dental procedures. If an invasive dental procedure was necessary, preferably stop the bisphosphonate approximately 3 months prior to reduce the risk. Let your dentist know that you are on this medication.  The data available at this time suggests that there is probably a small increase risk of atypical (nontraumatic) subtrochanteric fractures of the femur in patients on bisphosphonate therapy compared to those not on it. One large study suggested that for every 100 fractures prevented with bisphosphonate therapy, less than one femur fracture will occur. Other studies suggest one episode per 2,500 patient years. Patient should call with leg pain.        All questions were answered.  The patient indicates understanding of the above issues and agrees with the plan set forth.        Follow-up:  1 year.    Yessica Rose MD  Endocrinology  [facility]  CC: Shannon Arciniega    Answers for HPI/ROS submitted by the patient on 4/18/2023  General Symptoms: No  Skin Symptoms: No  HENT Symptoms: No  EYE SYMPTOMS: No  HEART SYMPTOMS: No  LUNG SYMPTOMS: No  INTESTINAL SYMPTOMS: No  URINARY SYMPTOMS: No  GYNECOLOGIC SYMPTOMS: No  BREAST SYMPTOMS: No  SKELETAL SYMPTOMS: No  BLOOD SYMPTOMS: No  NERVOUS SYSTEM SYMPTOMS: No  MENTAL HEALTH SYMPTOMS: No

## 2023-04-25 NOTE — NURSING NOTE
Is the patient currently in the state of [place]? YES    Visit mode:VIDEO    If the visit is dropped, the patient can be reconnected by: TELEPHONE VISIT: Phone number: [phone]    Will anyone else be joining the visit? NO    How would you like to obtain your AVS? MyChart    Are changes needed to the allergy or medication list? NO    Reason for visit:   Chief Complaint   Patient presents with     Video Visit     Osteoporosis       Isis Sharma MA

## 2023-04-25 NOTE — PATIENT INSTRUCTIONS
[facility]  Dr Rose, Endocrinology Department    [facility]   [address] [id number]  [place] [address]  Appointment [phone]  Fax: [phone]  [place]: Monday - Thursday      Plan for PROLIA.  PROLIA Scheduling information:  It will be done in clinic. 2023, 2023.  You need to make nurse only appointment. (call [place]: [phone] or [place]:[phone] and schedule Nurse only appointment)  PROLIA is every 6 months injection- so please schedule accordingly.  It is recommended NOT to miss or delay PROLIA injections.    Follow up in 1 year.  DEXA in 2024 onwards.    Possible major side effects of Denosumab (PROLIA) include risk for atypical femur fractures, hypersensitivity, low calcium levels, osteonecrosis of jaw (which can manifest as jaw pain, osteomyelitis, osteitis, bone erosion, tooth/periodontal infection, toothache, gingival ulceration/erosion). Other s/e include but not limited to Hypertension, Headache and peripheral edema.   Always let your dentist lnow about the medication if plan for major dental procedure.    Indication: Prolia  (denosumab) is a prescription medicine used to treat osteoporosis in patients who:   Are at high risk for fracture, meaning patients who have had a fracture related to osteoporosis, or who have multiple risk factors for fracture   Cannot use another osteoporosis medicine or other osteoporosis medicines did not work well   The timeline for early/late injections would be 4 weeks early and any time after the 6 month johnna. If a patient receives their injection late, then the subsequent injection would be 6 months from the date that they actually received the injection      The pt was advised to  Maintain an adequate calcium and vitamin D intake and to supplement vitamin D if needed to maintain serum levels of 25 hydroxy D (25 OH D) between 30-60 ng/ml.  Limit alcohol intake to no more than 2 servings per  day.  Limit caffeine intake.  Maintain an active lifestyle including weight-bearing exercises for at least 30 mins daily.  Take measures to reduce the risk of falling.    You should get 1000- 1200 mg/day calcium in divided doses of no more than 500 mg/dose.  This INCLUDES what is in your food as well as what is in any supplements you may be taking.    Vit D about 800-1000 IU/day ( unless you have vit D deficiency- in that case higher dose)  Dietary sources of calcium:: These also contain vitamin D  Milk                            8 oz            300 mg calcium  Yogurt                          1 cup           400 mg calcium   Hard cheese                     1.5 oz          300 mg  Cottage cheese                  2 cup           300 mg  Orange juice with Calcium       8 oz            300 mg  Low fat dairy sources are recommended      You should get 30 minutes of moderate weight bearing exercise on most days of the week .  Weight bearing exercise includes such things as walking, jogging, hiking, dancing.  You should also get Strength training 2 or more times/week in addition to other weight -being exercise. Strength training uses weight or resistance beyond that seen in everyday activities -(pilates, weight training with free weights, weight machines or resistance bands)    Living with Osteoporosis: Preventing Fractures  If you have osteoporosis, you can do a lot to reduce its effect on your life. Knowing how to prevent fractures and spinal curvature can help you live more comfortably and safely with this disease.  Reducing your risk for fractures  The most common fracture sites in people with osteoporosis are the wrist, spine, and hip. These fractures are often caused by accidents and falls. All fractures are painful and may limit what you can do. But hip fractures are very serious. They often need surgery, and it can take months to recover. To reduce your risk for fractures:  Get regular exercise. Try walking,  swimming, or weight training.  Eat foods that are rich in calcium, or take calcium supplements.  Make your home safe to help avoid accidents.  Take extra precautions not to fall in risky areas, such as icy sidewalks.  Understanding spinal fractures  Your spine is made up of many bones called vertebrae. Osteoporosis can cause the vertebrae in your spine to collapse. As a result, your upper back may arch forward, creating a curvature. Spine fractures may also result from back strain and bad posture. You will also lose height. Your lower spine must then adjust to keep your body balanced. This can cause back pain. To prevent or lessen these spinal changes:  Practice good posture.  Use proper techniques if you need to lift heavy objects.  Do back exercises to help your posture.  Lie on your back when you have pain.  Ask your healthcare provider about these and other ways to help your spine.  Josefina last reviewed this educational content on 5/1/2018 2000-2021 [employer]. All rights reserved. This information is not intended as a substitute for professional medical care. Always follow your healthcare professional's instructions.          Living with Osteoporosis: Regular Exercise  If you have osteoporosis, exercise is vital for your health. It can prevent bone fractures and spine changes. It will slow bone loss. Exercise will strengthen your body. It can also be fun. A variety of exercises is best. See below for exercises that can help you. But before you start, talk with your healthcare provider to be sure these exercises are right for you.   Resistance exercises. These build muscle strength and maintain bone mass. They also make you less prone to injury. Exercises include lifting small weights, doing push-ups and sit-ups, using elastic exercise bands, and using weight machines.   Weight-bearing activities. These help your whole body. They also help you maintain bone mass. Activities include walking,  dancing, and housework.   Non-weight-bearing exercises. These help prevent back strain and pain. They do this by building the trunk and leg muscles. Exercises that help with flexibility can prevent falls. Examples include swimming, water exercise, and stretching.   Staying safe  Here are tips to stay safe:   Always check with your healthcare provider before starting any new exercise program.  Use weights only as instructed.  Stop any exercise that causes pain.  StayWell last reviewed this educational content on 5/1/2018 2000-2021 [employer]. All rights reserved. This information is not intended as a substitute for professional medical care. Always follow your healthcare professional's instructions.          Preventing Osteoporosis: Avoiding Bone Loss  Certain factors can speed up bone loss or decrease bone growth. For example, alcohol, cigarettes, and certain medicines reduce bone mass. Some foods make it hard for your body to absorb calcium.  Things to avoid  Here are things to avoid to help prevent osteoporosis:  Alcohol. This is toxic to bones. It is a major cause of bone loss. Heavy drinking can cause osteoporosis even if you have no other risk factors.  Smoking. This reduces bone mass. Smoking may also interfere with estrogen levels and cause early menopause.  Inactivity. Not being active makes your bones lose strength and become thinner. Over time, thin bones may break. Women who aren't active are at a high risk for osteoporosis.  Certain medicines. Some medicines, such as cortisone, increase bone loss. They also decrease bone growth. Ask your healthcare provider about any side effects of your medicines, and how to prevent them.  Protein-rich or salty foods. Eaten in large amounts, these foods may deplete calcium.  Caffeine. This increases calcium loss. People who drink a lot of coffee, tea, or soda lose more calcium than those who don't.  StayWell last reviewed this educational content on  2018-2021 [employer]. All rights reserved. This information is not intended as a substitute for professional medical care. Always follow your healthcare professional's instructions.          Preventing Osteoporosis: Meeting Your Calcium Needs  Your body needs calcium to build and repair bones. But it can't make calcium on its own. That's why it's important to eat calcium-rich foods. Some foods are naturally rich in calcium. Others have calcium added (fortified). It's best to get calcium from the foods you eat. But if you can't get enough, you may want to take calcium supplements. To meet your daily calcium needs, try the foods listed below.               Dairy Fish & beans Other sources   Source   Calcium (mg) per serving   Source   Calcium (mg) per serving   Source   Calcium (mg) per serving   Low-fat yogurt, plain   415 mg/8 oz.   Sardines, [place], canned, with bones   351 mg/3 oz.   Oatmeal, instant, fortified   215 mg/1 cup   Nonfat milk   302 mg/1 cup   [place], sockeye, canned, with bones   239 mg/3 oz.   Tofu made with calcium sulfate   204 mg/3 oz.   Low-fat milk   297 mg/1 cup   Soybeans, fresh, boiled   131 mg/1/2 cup   Collards   179 mg/1/2 cup   Swiss cheese   272 mg/1 oz.   White beans, cooked   81 mg/1/2 cup   English muffin, whole wheat   175 mg/1 muffin   Cheddar cheese   205 mg/1 oz.   Navy beans, cooked   79 mg/1/2 cup   Kale   90 mg/1/2 cup   Ice cream strawberry   79 mg/1/2 cup           Orange, navel   56 mg/1 medium   Note: Calcium levels may vary depending on brand and size.  Daily calcium needs  14 to 18 years old: 1,300 mg  19 to 30 years old: 1,000 mg  31 to 50 years old: 1,000 mg  51 to 70 years old, women: 1,200 mg  51 to 70 years old, men: 1,000 mg  Pregnant or nursin to 18 years old: 1,300 mg, 19 to 50 years old: 1,000 mg  Older than 70 (women and men): 1,200 mg   Josefina last reviewed this educational content on 2018-2021 [employer]. All rights reserved. This information is not intended as a substitute for professional medical care. Always follow your healthcare professional's instructions.

## 2023-04-25 NOTE — LETTER
"    4/25/2023         RE: Julia Key  [address] [place] [address]        Dear Colleague,    Thank you for referring your patient, Julia Key, to the [facility]. Please see a copy of my visit note below.    THIS IS A VIDEO VISIT:    Phone call visit/virtual visit encounter:    Name of patient: Julia Key    Date of encounter: 4/25/2023    Time of start of video visit: 8:41    Video started: 8:45    Video ended: 8:55    Provider location: working from home/ [facility]    Patient location: patients home.    Mode of transmission: [email or web address] video/ [employer]    Verbal consent: obtained before starting visit. Pt is agreeable.      The patient has been notified of following:      \"This VIDEO visit will be conducted via a call between you and your physician/provider. We have found that certain health care needs can be provided without the need for a physical exam.  This service lets us provide the care you need with a short phone conversation.  If a prescription is necessary we can send it directly to your pharmacy.  If lab work is needed we can place an order for that and you can then stop by our lab to have the test done at a later time.     With new updates with corona virus patient might be billed as clinic visit.     If during the course of the call the physician/provider feels a telephone visit is not appropriate, you will not be charged for this service.\"      Past medical history, social history, family history, allergy and medications were reviewed and updated as appropriate.  Reviewed pertinent labs, notes, imaging studies personally.      Name: Julia Key  Date: 4/25/2023  Seen for f/u of osteoporosis.     HPI:  Julia Key is a 62 year old female who presents for the evaluation of osteoporosis.   has a past medical history of Abnormal cardiovascular stress test (8/2/2022), Depression, prolonged, Hyperlipidemia, Idiopathic osteoporosis (3/8/2023), and " Osteopenia.    She was diagnosed with osteopenia few years back and then osteoporosis with DEXA 7/2022.    DEXA 2015: Osteopenia.    DEXA 7/2022:Osteoporosis. There has been probable significant decrease in bone density of the lumbar spine and of the hip(s).     RISK FACTORS:  Post-menopausal, Parent history of osteoporosis, Parent history of a hip fracture, follow up osteopenia.  Started Fosamax 12/2022 but was not able to tolerate FOSAMAX 2/2 to GI s/e like severe abdominal pain.  Trial of BONIVA 1/2023-- but had face flushing, tingling lips and nausea.+ skin reaction.  Plan was for reclast but it was discontinued in the setting of above.  Here to discuss next options.    GERD: yes- under control with diet only. Not on medication.    Dental health: normal. No major dental procedures planned.    Kidney function: within normal limits    Previous treatment for osteopenia/osteoporosis: previous Fosamax for one month in 2013 (she had dizziness on it and GI problems)    Current treatment for Osteopenia/Osteoporosis: Calcium, Vitamin D    Smoke:No  Family History:Yes: mother with osteoporosis  Menstrual history/Birthing: s/p menopause. Took HRT for about 6 years. Last use was May 2020.  Fractures:No  Kidney stones: No  GI Surgery:No  Duration of therapy:  As noted above  Exercise:Yes: 4-5 times a week- 30-60 min each time.  Diet:  0-1 servings of dairy/day  Ca/Vitamin D: 1200 mg of calcium/day, 2000 international unit(s) of vit D/day  Alcohol:  No  Eating Disorder: No  Steroid Use:  No  PMH/PSH:  Past Medical History:   Diagnosis Date     Abnormal cardiovascular stress test 8/2/2022     Depression, prolonged     Cyclical related to 's health     Hyperlipidemia     One high reading late Fall 2016     Idiopathic osteoporosis 3/8/2023     Osteopenia      Past Surgical History:   Procedure Laterality Date     CV CORONARY ANGIOGRAM N/A 8/11/2022    Procedure: Coronary Angiogram;  Surgeon: Diana Smith MD;   Location:  [facility]     [facility] LT SHOULDER G/E 2 VIEWS  01/01/1999 1997, 1999 - decompression, clavicle resection     Family Hx:  Family History   Problem Relation Age of Onset     Osteoporosis Mother      Dementia Mother      Hyperlipidemia Mother      Hypertension Father      Cerebrovascular Disease Father      Coronary Artery Disease Father 30     Coronary Artery Disease Brother      No Known Problems Maternal Grandmother      No Known Problems Maternal Grandfather      Myocardial Infarction Paternal Grandmother      Obesity Paternal Grandmother        Social Hx:  Social History     Socioeconomic History     Marital status:      Spouse name: Not on file     Number of children: Not on file     Years of education: Not on file     Highest education level: Not on file   Occupational History     Occupation: Retired .   Tobacco Use     Smoking status: Never     Smokeless tobacco: Never   Vaping Use     Vaping status: Never Used   Substance and Sexual Activity     Alcohol use: Yes     Comment: 2-3 drinks/month, socially     Drug use: Never     Sexual activity: Yes     Partners: Male     Birth control/protection: None   Other Topics Concern     Parent/sibling w/ CABG, MI or angioplasty before 65F 55M? 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   since 1989. One daughter, one dog.     Social Determinants of Health     Financial Resource Strain: Low Risk  (7/20/2022)    Overall Financial Resource Strain (CARDIA)      Difficulty of Paying Living Expenses: Not hard at all   Food Insecurity: No Food Insecurity (7/20/2022)    Hunger Vital Sign      Worried About Running Out of Food in the Last Year: Never true      Ran Out of Food in the Last Year: Never true   Transportation Needs: No Transportation Needs (7/20/2022)    PRAPARE - Transportation      Lack of Transportation (Medical): No      Lack of  Transportation (Non-Medical): No   Physical Activity: Insufficiently Active (7/20/2022)    Exercise Vital Sign      Days of Exercise per Week: 3 days      Minutes of Exercise per Session: 30 min   Stress: No Stress Concern Present (7/20/2022)    [employer] [employer] - Occupational Stress Questionnaire      Feeling of Stress : Not at all   Social Connections: Moderately Integrated (7/20/2022)    Social Connection and Isolation Panel [NHANES]      Frequency of Communication with Friends and Family: More than three times a week      Frequency of Social Gatherings with Friends and Family: Once a week      Attends Mu-ism Services: 1 to 4 times per year      Active Member of Clubs or Organizations: No      Attends Club or Organization Meetings: Not on file      Marital Status:    Intimate Partner Violence: Not on file   Housing Stability: Low Risk  (7/20/2022)    Housing Stability Vital Sign      Unable to Pay for Housing in the Last Year: No      Number of Places Lived in the Last Year: 1      Unstable Housing in the Last Year: No          MEDICATIONS:  has a current medication list which includes the following prescription(s): calcium-vitamin d, cetirizine, losartan, nitroglycerin, rosuvastatin, and triamcinolone.    ROS     ROS: 10 point ROS neg other than the symptoms noted above in the HPI.    Physical Exam   VS: There were no vitals taken for this visit.  GENERAL: healthy, alert and no distress  EYES: Eyes grossly normal to inspection, conjunctivae and sclerae normal  ENT: no nose swelling, nasal discharge.  Thyroid: no apparent thyroid nodules  RESP: no audible wheeze, cough, or visible cyanosis.  No visible retractions or increased work of breathing.  Able to speak fully in complete sentences.  ABDO: not evaluated.  EXTREMITIES: no hand tremors.  NEURO: Cranial nerves grossly intact, mentation intact and speech normal  SKIN: No apparent skin lesions, rash or edema seen   PSYCH:  mentation appears normal, affect normal/bright, judgement and insight intact, normal speech and appearance well-groomed      LABS:  Calcium:  ENDO CALCIUM LABS-[facility] Latest Ref Rng & Units 8/11/2022   CALCIUM 8.5 - 10.1 mg/dL 9.4     Creatinine:  Creatinine   Date Value Ref Range Status   02/24/2023 0.80 0.51 - 0.95 mg/dL Final   03/29/2021 0.76 0.52 - 1.04 mg/dL Final       TFTs:  Lab Results   Component Value Date    TSH 3.01 12/16/2016       PTH:  Vitamin D:  Vitamin D Deficiency Screening Results:  Lab Results   Component Value Date    VITDT 45 12/21/2022       BoneTurnOverMarkers:    DEXA 7/2022:  FINDINGS:               Lumbar Spine (L1-L4)      T-score:  -2.5, degenerative changes present               Left Femoral Neck            T-score:  -1.7               Right Femoral Neck          T-score:  -1.3                  Lumbar (L1-L4) BMD: 0.889  Previous: 1.021                          Total Hip Mean BMD: 0.832  Previous: 0.880     Comparison is made to another DXA performed on a different [employer] machine on 07/27/2015.       IMPRESSION  Osteoporosis., Degenerative changes of the lumbar spine which may falsely elevate results.     There has been probable significant decrease in bone density of the lumbar spine. There has been probable significant decrease in bone density of the hip(s).        All pertinent notes, labs, and images personally reviewed by me.     A/P  Ms.Mary ERIKA Key is a 62 year old here for the evaluation of:    Age-related osteoporosis without current pathological fracture  Comment: Risk factors for low bone density include personal history of fracture or family history, , female, Estrogen deficiency.   DEXA 7/2022:Osteoporosis. There has been probable significant decrease in bone density of the lumbar spine and of the hip(s).   Normal calcium, creatinine and vit D.  Not able to tolerate FOSAMAX and BONIVA as noted above.  Plan:   Discussed diagnosis, pathophysiology, management and  treatment options of condition with pt.  Plan for PROLIA--5/2023, 11/2023.  Follow up in 1 year.  DEXA in 7/2024 onwards.    Discussed indications, risks and benefits of all medications prescribed, and answered questions to patient's satisfaction.    The pt was advised to    Maintain an adequate calcium and vitamin D intake and to supplement vitamin D if needed to maintain serum levels of 25 hydroxy D (25 OH D) between 30-60 ng/ml.    Limit alcohol intake to no more than 2 servings per day.    Limit caffeine intake.    Maintain an active lifestyle including weight-bearing exercises for at least 30 mins daily.    Take measures to reduce the risk of falling.    Reclast--Treatment with bisphosphonate therapy will decrease fracture risk 50-70%.   There is risk of osteonecrosis of the jaw in patients using bisphosphonates is approximately 1/1700-1/100,000, with development most likely related to invasive dental procedures. If an invasive dental procedure was necessary, preferably stop the bisphosphonate approximately 3 months prior to reduce the risk. Let your dentist know that you are on this medication.  The data available at this time suggests that there is probably a small increase risk of atypical (nontraumatic) subtrochanteric fractures of the femur in patients on bisphosphonate therapy compared to those not on it. One large study suggested that for every 100 fractures prevented with bisphosphonate therapy, less than one femur fracture will occur. Other studies suggest one episode per 2,500 patient years. Patient should call with leg pain.        All questions were answered.  The patient indicates understanding of the above issues and agrees with the plan set forth.        Follow-up:  1 year.    Yessica Rose MD  Endocrinology  [facility]  CC: Shannon Arciniega    Answers for HPI/ROS submitted by the patient on 4/18/2023  General Symptoms: No  Skin Symptoms: No  HENT Symptoms: No  EYE SYMPTOMS:  No  HEART SYMPTOMS: No  LUNG SYMPTOMS: No  INTESTINAL SYMPTOMS: No  URINARY SYMPTOMS: No  GYNECOLOGIC SYMPTOMS: No  BREAST SYMPTOMS: No  SKELETAL SYMPTOMS: No  BLOOD SYMPTOMS: No  NERVOUS SYSTEM SYMPTOMS: No  MENTAL HEALTH SYMPTOMS: No          Again, thank you for allowing me to participate in the care of your patient.        Sincerely,        Yessica Rose MD

## 2023-04-25 NOTE — ADDENDUM NOTE
Addended by: RANI BEARDEN on: 4/25/2023 10:33 AM     Modules accepted: Level of Service    
23-Jun-2021 19:45

## 2023-05-02 ENCOUNTER — ALLIED HEALTH/NURSE VISIT (OUTPATIENT)
Dept: PEDIATRICS | Facility: CLINIC | Age: 63
End: 2023-05-02
Payer: COMMERCIAL

## 2023-05-02 DIAGNOSIS — M81.0 AGE RELATED OSTEOPOROSIS, UNSPECIFIED PATHOLOGICAL FRACTURE PRESENCE: Primary | ICD-10-CM

## 2023-05-02 PROCEDURE — 96372 THER/PROPH/DIAG INJ SC/IM: CPT | Performed by: INTERNAL MEDICINE

## 2023-05-02 PROCEDURE — 99207 PR NO CHARGE NURSE ONLY: CPT

## 2023-05-02 NOTE — PROGRESS NOTES
Clinic Administered Medication Documentation      Prolia Documentation    Indication: Prolia  (denosumab) is a prescription medicine used to treat osteoporosis in patients who:     Are at high risk for fracture, meaning patients who have had a fracture related to osteoporosis, or who have multiple risk factors for fracture.    Cannot use another osteoporosis medicine or other osteoporosis medicines did not work well.    The timeline for early/late injections would be 4 weeks early and any time after the 6 month johnna. If a patient receives their injection late, then the subsequent injection would be 6 months from the date that they actually received the injection.    When was the last injection?  First dose   Has the prior authorization been completed?  Yes  Is there an active order (written within the past 365 days, with administrations remaining, not ) in the chart?  Yes  Patient denies any dental work involving the bone (e.g. tooth extraction or dental implants) in the past 4 weeks?  Yes  Patient denies plans for any dental work involving the bone (e.g. tooth extraction or dental implants) in the next 4 weeks? Yes    The following steps were completed to comply with the REMS program for Prolia:    Reviewed information in the Medication Guide and Patient Counseling Chart, including the serious risks of Prolia  and the symptoms of each risk.    Advised patient to seek prompt medical attention if they have signs or symptoms of any of the serious risks.    Provided each patient a copy of the Medication Guide and Patient Brochure.      Prior to injection, verified patient identity using patient's name and date of birth. Medication was administered. Please see MAR and medication order for additional information. Patient instructed to remain in clinic for 15 minutes and report any adverse reaction to staff immediately but patient declined.    Vial/Syringe: Syringe  Was this medication supplied by the patient?  No  Verified that the patient has refills remaining in their prescription.

## 2023-07-16 ENCOUNTER — TRANSFERRED RECORDS (OUTPATIENT)
Dept: HEALTH INFORMATION MANAGEMENT | Facility: CLINIC | Age: 63
End: 2023-07-16
Payer: COMMERCIAL

## 2023-07-21 SDOH — ECONOMIC STABILITY: FOOD INSECURITY: WITHIN THE PAST 12 MONTHS, THE FOOD YOU BOUGHT JUST DIDN'T LAST AND YOU DIDN'T HAVE MONEY TO GET MORE.: NEVER TRUE

## 2023-07-21 SDOH — ECONOMIC STABILITY: INCOME INSECURITY: IN THE LAST 12 MONTHS, WAS THERE A TIME WHEN YOU WERE NOT ABLE TO PAY THE MORTGAGE OR RENT ON TIME?: NO

## 2023-07-21 SDOH — ECONOMIC STABILITY: INCOME INSECURITY: HOW HARD IS IT FOR YOU TO PAY FOR THE VERY BASICS LIKE FOOD, HOUSING, MEDICAL CARE, AND HEATING?: NOT HARD AT ALL

## 2023-07-21 SDOH — HEALTH STABILITY: PHYSICAL HEALTH: ON AVERAGE, HOW MANY DAYS PER WEEK DO YOU ENGAGE IN MODERATE TO STRENUOUS EXERCISE (LIKE A BRISK WALK)?: 6 DAYS

## 2023-07-21 SDOH — HEALTH STABILITY: PHYSICAL HEALTH: ON AVERAGE, HOW MANY MINUTES DO YOU ENGAGE IN EXERCISE AT THIS LEVEL?: 40 MIN

## 2023-07-21 SDOH — ECONOMIC STABILITY: FOOD INSECURITY: WITHIN THE PAST 12 MONTHS, YOU WORRIED THAT YOUR FOOD WOULD RUN OUT BEFORE YOU GOT MONEY TO BUY MORE.: NEVER TRUE

## 2023-07-21 ASSESSMENT — ENCOUNTER SYMPTOMS
DIARRHEA: 0
WEAKNESS: 0
DIZZINESS: 0
SORE THROAT: 0
NAUSEA: 0
JOINT SWELLING: 0
MYALGIAS: 0
CONSTIPATION: 0
HEMATURIA: 0
HEARTBURN: 0
ABDOMINAL PAIN: 0
COUGH: 0
FREQUENCY: 0
PALPITATIONS: 0
ARTHRALGIAS: 0
HEMATOCHEZIA: 0
PARESTHESIAS: 0
NERVOUS/ANXIOUS: 0
SHORTNESS OF BREATH: 0
DYSURIA: 0
FEVER: 0
CHILLS: 0
HEADACHES: 0
BREAST MASS: 0
EYE PAIN: 0

## 2023-07-21 ASSESSMENT — LIFESTYLE VARIABLES
HOW OFTEN DO YOU HAVE A DRINK CONTAINING ALCOHOL: MONTHLY OR LESS
AUDIT-C TOTAL SCORE: 1
HOW OFTEN DO YOU HAVE SIX OR MORE DRINKS ON ONE OCCASION: NEVER
SKIP TO QUESTIONS 9-10: 1
HOW MANY STANDARD DRINKS CONTAINING ALCOHOL DO YOU HAVE ON A TYPICAL DAY: 1 OR 2

## 2023-07-21 ASSESSMENT — SOCIAL DETERMINANTS OF HEALTH (SDOH)
HOW OFTEN DO YOU GET TOGETHER WITH FRIENDS OR RELATIVES?: ONCE A WEEK
IN A TYPICAL WEEK, HOW MANY TIMES DO YOU TALK ON THE PHONE WITH FAMILY, FRIENDS, OR NEIGHBORS?: MORE THAN THREE TIMES A WEEK
HOW OFTEN DO YOU ATTEND CHURCH OR RELIGIOUS SERVICES?: 1 TO 4 TIMES PER YEAR
DO YOU BELONG TO ANY CLUBS OR ORGANIZATIONS SUCH AS CHURCH GROUPS UNIONS, FRATERNAL OR ATHLETIC GROUPS, OR SCHOOL GROUPS?: NO

## 2023-07-28 ENCOUNTER — OFFICE VISIT (OUTPATIENT)
Dept: PEDIATRICS | Facility: CLINIC | Age: 63
End: 2023-07-28
Payer: COMMERCIAL

## 2023-07-28 VITALS
HEIGHT: 65 IN | OXYGEN SATURATION: 100 % | SYSTOLIC BLOOD PRESSURE: 102 MMHG | DIASTOLIC BLOOD PRESSURE: 60 MMHG | WEIGHT: 142 LBS | TEMPERATURE: 97.7 F | BODY MASS INDEX: 23.66 KG/M2 | RESPIRATION RATE: 16 BRPM | HEART RATE: 71 BPM

## 2023-07-28 DIAGNOSIS — Z98.890 STATUS POST CORONARY ANGIOGRAM: ICD-10-CM

## 2023-07-28 DIAGNOSIS — R94.39 ABNORMAL CARDIOVASCULAR STRESS TEST: ICD-10-CM

## 2023-07-28 DIAGNOSIS — E78.5 HYPERLIPIDEMIA, UNSPECIFIED HYPERLIPIDEMIA TYPE: ICD-10-CM

## 2023-07-28 DIAGNOSIS — M81.0 AGE RELATED OSTEOPOROSIS, UNSPECIFIED PATHOLOGICAL FRACTURE PRESENCE: ICD-10-CM

## 2023-07-28 DIAGNOSIS — Z00.00 ROUTINE GENERAL MEDICAL EXAMINATION AT A HEALTH CARE FACILITY: Primary | ICD-10-CM

## 2023-07-28 DIAGNOSIS — F41.8 DEPRESSION WITH ANXIETY: ICD-10-CM

## 2023-07-28 DIAGNOSIS — Z12.4 CERVICAL CANCER SCREENING: ICD-10-CM

## 2023-07-28 DIAGNOSIS — Z12.31 ENCOUNTER FOR SCREENING MAMMOGRAM FOR BREAST CANCER: ICD-10-CM

## 2023-07-28 PROCEDURE — 99396 PREV VISIT EST AGE 40-64: CPT | Performed by: NURSE PRACTITIONER

## 2023-07-28 PROCEDURE — G0123 SCREEN CERV/VAG THIN LAYER: HCPCS | Performed by: NURSE PRACTITIONER

## 2023-07-28 PROCEDURE — 87624 HPV HI-RISK TYP POOLED RSLT: CPT | Performed by: NURSE PRACTITIONER

## 2023-07-28 ASSESSMENT — ENCOUNTER SYMPTOMS
NERVOUS/ANXIOUS: 0
SORE THROAT: 0
JOINT SWELLING: 0
HEMATURIA: 0
DYSURIA: 0
MYALGIAS: 0
SHORTNESS OF BREATH: 0
NAUSEA: 0
HEADACHES: 0
DIZZINESS: 0
FEVER: 0
COUGH: 0
DIARRHEA: 0
CONSTIPATION: 0
PALPITATIONS: 0
FREQUENCY: 0
ABDOMINAL PAIN: 0
HEARTBURN: 0
PARESTHESIAS: 0
CHILLS: 0
EYE PAIN: 0
HEMATOCHEZIA: 0
BREAST MASS: 0
ARTHRALGIAS: 0
WEAKNESS: 0

## 2023-07-28 ASSESSMENT — PAIN SCALES - GENERAL: PAINLEVEL: NO PAIN (0)

## 2023-07-28 NOTE — PROGRESS NOTES
SUBJECTIVE:   CC: Julia is an 62 year old who presents for preventive health visit.       2023     7:48 AM   Additional Questions   Roomed by Estefani CHURCH   Accompanied by Self         2023     7:48 AM   Patient Reported Additional Medications   Patient reports taking the following new medications n/a       Healthy Habits:     Getting at least 3 servings of Calcium per day:  Yes    Bi-annual eye exam:  Yes    Dental care twice a year:  Yes    Sleep apnea or symptoms of sleep apnea:  None    Diet:  Gluten-free/reduced    Frequency of exercise:  6-7 days/week    Duration of exercise:  30-45 minutes    Taking medications regularly:  Yes    Medication side effects:  Other    Additional concerns today:  No    Osteoporosis - on prolia injections, managed by endo. Active with walking, yoga, pilates.     [facility] 58, was told to repeat in 10 years.    Pap - due  Mammo - due  DEXA - utd    Social History     Tobacco Use    Smoking status: Never    Smokeless tobacco: Never   Substance Use Topics    Alcohol use: Yes     Comment: 2-3 drinks/month, socially           2023     9:05 AM   Alcohol Use   Prescreen: >3 drinks/day or >7 drinks/week? No     Reviewed orders with patient.  Reviewed health maintenance and updated orders accordingly - Yes  BP Readings from Last 3 Encounters:   23 102/60   23 134/82   23 123/75    Wt Readings from Last 3 Encounters:   23 64.4 kg (142 lb)   23 67.1 kg (148 lb)   22 66.4 kg (146 lb 4.8 oz)           Breast Cancer Screenin/20/2022     9:08 AM   Breast CA Risk Assessment (FHS-7)   Do you have a family history of breast, colon, or ovarian cancer? No / Unknown     Mammogram Screening: Recommended mammography every 1-2 years with patient discussion and risk factor consideration  Pertinent mammograms are reviewed under the imaging tab.    History of abnormal Pap smear: NO - age 30-65 PAP  every 5 years with negative HPV co-testing recommended      7/29/2016    12:00 AM 7/27/2015    12:00 AM   PAP / HPV   PAP (Historical) NIL  NIL      Reviewed and updated as needed this visit by clinical staff   Tobacco  Allergies  Meds              Reviewed and updated as needed this visit by Provider                 Patient Active Problem List   Diagnosis    Osteopenia    Hyperlipidemia    Depression with anxiety    Abnormal cardiovascular stress test    Status post coronary angiogram    Age related osteoporosis, unspecified pathological fracture presence    Idiopathic osteoporosis     Past Medical History:   Diagnosis Date    Abnormal cardiovascular stress test 08/02/2022    Arthritis 1994    Depression, prolonged     Cyclical related to 's health    Hyperlipidemia     One high reading late Fall 2016    Idiopathic osteoporosis 03/08/2023    Osteopenia      Past Surgical History:   Procedure Laterality Date    COLONOSCOPY  08/2019    CV CORONARY ANGIOGRAM N/A 08/11/2022    Procedure: Coronary Angiogram;  Surgeon: Diana Smith MD;  [facility]  [facility]    ORTHOPEDIC SURGERY  05/1997    Z LT SHOULDER G/E 2 VIEWS  01/01/1999 1997, 1999 - decompression, clavicle resection     Family History   Problem Relation Age of Onset    Osteoporosis Mother     Dementia Mother     Hyperlipidemia Mother     Hypertension Father     Cerebrovascular Disease Father     Coronary Artery Disease Father     Diabetes Father     Hyperlipidemia Father     Thyroid Disease Father     Coronary Artery Disease Brother     No Known Problems Maternal Grandmother     No Known Problems Maternal Grandfather     Myocardial Infarction Paternal Grandmother     Obesity Paternal Grandmother      Social History     Socioeconomic History    Marital status:      Spouse name: Not on file    Number of children: Not on file    Years of education: Not on file    Highest education level: Not on file   Occupational History     Occupation: Retired .   Tobacco Use    Smoking status: Never    Smokeless tobacco: Never   Vaping Use    Vaping Use: Never used   Substance and Sexual Activity    Alcohol use: Yes     Comment: 2-3 drinks/month, socially    Drug use: No    Sexual activity: Yes     Partners: Male     Birth control/protection: None   Other Topics Concern    Parent/sibling w/ CABG, MI or angioplasty before 65F 55M? 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 One daughter, one dog.     Social Determinants of Health     Financial Resource Strain: Low Risk  (7/21/2023)    Overall Financial Resource Strain (CARDIA)     Difficulty of Paying Living Expenses: Not hard at all   Food Insecurity: No Food Insecurity (7/21/2023)    Hunger Vital Sign     Worried About Running Out of Food in the Last Year: Never true     Ran Out of Food in the Last Year: Never true   Transportation Needs: No Transportation Needs (7/21/2023)    PRAPARE - Transportation     Lack of Transportation (Medical): No     Lack of Transportation (Non-Medical): No   Physical Activity: Sufficiently Active (7/21/2023)    Exercise Vital Sign     Days of Exercise per Week: 6 days     Minutes of Exercise per Session: 40 min   Stress: No Stress Concern Present (7/21/2023)    [employer] - Occupational Stress Questionnaire     Feeling of Stress : Not at all   Social Connections: Moderately Integrated (7/21/2023)    Social Connection and Isolation Panel [NHANES]     Frequency of Communication with Friends and Family: More than three times a week     Frequency of Social Gatherings with Friends and Family: Once a week     Attends Nondenominational Services: 1 to 4 times per year     Active Member of Clubs or Organizations: No     Attends Club or Organization Meetings: Not on file     Marital Status:    Intimate Partner Violence: Not on file   Housing Stability:  "Low Risk  (7/21/2023)    Housing Stability Vital Sign     Unable to Pay for Housing in the Last Year: No     Number of Places Lived in the Last Year: 1     Unstable Housing in the Last Year: No     Current Outpatient Medications   Medication    CALCIUM-VITAMIN D PO    cetirizine (ZYRTEC) 10 MG tablet    losartan (COZAAR) 25 MG tablet    nitroGLYcerin (NITROSTAT) 0.4 MG sublingual tablet    rosuvastatin (CRESTOR) 20 MG tablet    triamcinolone (KENALOG) 0.1 % external cream     Current Facility-Administered Medications   Medication    denosumab (PROLIA) injection 60 mg        Allergies   Allergen Reactions    Boniva [Ibandronic Acid] Other (See Comments) and Hives     Tingling of lips      Fosamax [Alendronate] Swelling     Lips and throat swelling    Iodine Hives    Latex          Review of Systems   Constitutional:  Negative for chills and fever.   HENT:  Negative for congestion, ear pain, hearing loss and sore throat.    Eyes:  Negative for pain and visual disturbance.   Respiratory:  Negative for cough and shortness of breath.    Cardiovascular:  Negative for chest pain, palpitations and peripheral edema.   Gastrointestinal:  Negative for abdominal pain, constipation, diarrhea, heartburn, hematochezia and nausea.   Breasts:  Negative for tenderness, breast mass and discharge.   Genitourinary:  Negative for dysuria, frequency, genital sores, hematuria, pelvic pain, urgency, vaginal bleeding and vaginal discharge.   Musculoskeletal:  Negative for arthralgias, joint swelling and myalgias.   Skin:  Negative for rash.   Neurological:  Negative for dizziness, weakness, headaches and paresthesias.   Psychiatric/Behavioral:  Negative for mood changes. The patient is not nervous/anxious.         OBJECTIVE:   /60   Pulse 71   Temp 97.7  F (36.5  C) (Tympanic)   Resp 16   Ht 1.64 m (5' 4.57\")   Wt 64.4 kg (142 lb)   SpO2 100%   BMI 23.95 kg/m    Physical Exam  Constitutional: appears to be in no acute distress, " comfortable, pleasant.   Eyes: anicteric, conjunctiva clear without drainage or erythema. MARQUEZ.   Ears, Nose and Throat: tympanic membranes gray with LR,  nose without nasal discharge. OP: no erythema to posterior pharynx, negative post nasal drainage, tonsils +1 no erythema or exudate.  Neck: supple, thyroid palpable,not enlarged, no nodules   Breast: Exam deferred (deferred after discussion of exam options with patient, no symptoms or concerns).   Cardiovascular: regular rate and rhythm, normal S1 and S2, no murmurs, rubs or gallops, peripheral pulses full and symmetric; negative peripheral edema   Respiratory: Air entry throughout. Breathing pattern unlabored without the use of accessory muscles. Clear to auscultation A and P, no wheezes or crackles, normal breath sounds.    Gastrointestinal: rounded, soft. Positive bowel sounds x4, nontender, no masses.   Genitourinary: external genitalia is without lesions. Introitus is normal, vaginal walls pink and moist without lesions or evidence of trauma. There is no abnormal discharge from the cervix. Cervix is pink without polyps or lesions.  Musculoskeletal: full range of motion, no edema.   Skin: pink, turgor smooth and elastic. Negative for lesions or dryness.  Neurological: normal gait, no tremor.   Psychological: appropriate mood and affect.   Lymphatic: no cervical, axillary, supraclavicular, or infraclavicular lymphadenopathy    Diagnostic Test Results:  Labs reviewed in Epic    ASSESSMENT/PLAN:   (Z00.00) Routine general medical examination at a health care facility  (primary encounter diagnosis)  Age appropriate screening and preventative care have been addressed today. Vaccinations have been reviewed. Patient has been advised to follow a balanced diet. They have been advised to undertake routine aerobic activity. Colonoscopy has been reviewed and is up to date at this time. Recommend annual vision exams as well as biannual dental exams. They will follow up for  annual physical again in one year.   - [facility] age 58, was told to repeat in 10 years.  - Pap - due  - Mammo - due  - DEXA - utd    (M81.0) Age related osteoporosis, unspecified pathological fracture presence  On prolia injections, continue to follow with Endo.    (Z12.31) Encounter for screening mammogram for breast cancer  - *MA Screening Digital Bilateral    (Z12.4) Cervical cancer screening  -Pap screen with HPV - recommended age 30 - 65 years    (E78.5) Hyperlipidemia, unspecified hyperlipidemia type  (R94.39) Abnormal cardiovascular stress test  (Z98.890) Status post coronary angiogram  Continue to follow with cardiology. Taking rosuvastatin and losartan as prescribed. Has cards follow-up in August.     (F41.8) Depression with anxiety  Stable.           COUNSELING:  Reviewed preventive health counseling, as reflected in patient instructions  Special attention given to:        Regular exercise       Healthy diet/nutrition       Vision screening       Immunizations       Osteoporosis prevention/bone health       Colorectal Cancer Screening      She reports that she has never smoked. She has never used smokeless tobacco.            DARLENE Acevedo CNP  [facility]

## 2023-07-28 NOTE — Clinical Note
Can we get records from [facility] for her last colonoscopy, which she tells me took place when she was 58? Thanks!

## 2023-08-01 LAB
BKR LAB AP GYN ADEQUACY: NORMAL
BKR LAB AP GYN INTERPRETATION: NORMAL
BKR LAB AP HPV REFLEX: NORMAL
BKR LAB AP PREVIOUS ABNORMAL: NORMAL
PATH REPORT.COMMENTS IMP SPEC: NORMAL
PATH REPORT.COMMENTS IMP SPEC: NORMAL
PATH REPORT.RELEVANT HX SPEC: NORMAL

## 2023-08-02 ENCOUNTER — DOCUMENTATION ONLY (OUTPATIENT)
Dept: PEDIATRICS | Facility: CLINIC | Age: 63
End: 2023-08-02
Payer: COMMERCIAL

## 2023-08-02 LAB
HUMAN PAPILLOMA VIRUS 16 DNA: NEGATIVE
HUMAN PAPILLOMA VIRUS 18 DNA: NEGATIVE
HUMAN PAPILLOMA VIRUS FINAL DIAGNOSIS: NORMAL
HUMAN PAPILLOMA VIRUS OTHER HR: NEGATIVE

## 2023-08-02 NOTE — PROGRESS NOTES
Last colonoscopy took place 7/16/2019 at [facility]. Plan for repeat colonoscopy 7/16/2029 (10 years).

## 2023-08-08 ENCOUNTER — ANCILLARY PROCEDURE (OUTPATIENT)
Dept: MAMMOGRAPHY | Facility: CLINIC | Age: 63
End: 2023-08-08
Attending: NURSE PRACTITIONER
Payer: COMMERCIAL

## 2023-08-08 DIAGNOSIS — Z12.31 ENCOUNTER FOR SCREENING MAMMOGRAM FOR BREAST CANCER: ICD-10-CM

## 2023-08-08 PROCEDURE — 77067 SCR MAMMO BI INCL CAD: CPT | Mod: TC | Performed by: RADIOLOGY

## 2023-08-08 PROCEDURE — 77063 BREAST TOMOSYNTHESIS BI: CPT | Mod: TC | Performed by: RADIOLOGY

## 2023-08-23 ENCOUNTER — LAB (OUTPATIENT)
Dept: LAB | Facility: CLINIC | Age: 63
End: 2023-08-23
Payer: COMMERCIAL

## 2023-08-23 DIAGNOSIS — I10 BENIGN ESSENTIAL HYPERTENSION: ICD-10-CM

## 2023-08-23 DIAGNOSIS — E78.00 PURE HYPERCHOLESTEROLEMIA: ICD-10-CM

## 2023-08-23 LAB
ALBUMIN SERPL BCG-MCNC: 4.4 G/DL (ref 3.5–5.2)
ALP SERPL-CCNC: 41 U/L (ref 35–104)
ALT SERPL W P-5'-P-CCNC: 28 U/L (ref 0–50)
ANION GAP SERPL CALCULATED.3IONS-SCNC: 11 MMOL/L (ref 7–15)
AST SERPL W P-5'-P-CCNC: 31 U/L (ref 0–45)
BILIRUB SERPL-MCNC: 0.6 MG/DL
BUN SERPL-MCNC: 16.3 MG/DL (ref 8–23)
CALCIUM SERPL-MCNC: 9.3 MG/DL (ref 8.8–10.2)
CHLORIDE SERPL-SCNC: 105 MMOL/L (ref 98–107)
CHOLEST SERPL-MCNC: 165 MG/DL
CREAT SERPL-MCNC: 0.89 MG/DL (ref 0.51–0.95)
DEPRECATED HCO3 PLAS-SCNC: 24 MMOL/L (ref 22–29)
GFR SERPL CREATININE-BSD FRML MDRD: 72 ML/MIN/1.73M2
GLUCOSE SERPL-MCNC: 91 MG/DL (ref 70–99)
HDLC SERPL-MCNC: 62 MG/DL
LDLC SERPL CALC-MCNC: 82 MG/DL
NONHDLC SERPL-MCNC: 103 MG/DL
POTASSIUM SERPL-SCNC: 4 MMOL/L (ref 3.4–5.3)
PROT SERPL-MCNC: 7.2 G/DL (ref 6.4–8.3)
SODIUM SERPL-SCNC: 140 MMOL/L (ref 136–145)
TRIGL SERPL-MCNC: 107 MG/DL

## 2023-08-23 PROCEDURE — 80053 COMPREHEN METABOLIC PANEL: CPT

## 2023-08-23 PROCEDURE — 80061 LIPID PANEL: CPT

## 2023-08-23 PROCEDURE — 36415 COLL VENOUS BLD VENIPUNCTURE: CPT

## 2023-08-28 ENCOUNTER — OFFICE VISIT (OUTPATIENT)
Dept: CARDIOLOGY | Facility: CLINIC | Age: 63
End: 2023-08-28
Attending: INTERNAL MEDICINE
Payer: COMMERCIAL

## 2023-08-28 VITALS
WEIGHT: 146 LBS | BODY MASS INDEX: 24.32 KG/M2 | HEART RATE: 80 BPM | HEIGHT: 65 IN | OXYGEN SATURATION: 98 % | SYSTOLIC BLOOD PRESSURE: 122 MMHG | DIASTOLIC BLOOD PRESSURE: 79 MMHG

## 2023-08-28 DIAGNOSIS — I10 BENIGN ESSENTIAL HYPERTENSION: ICD-10-CM

## 2023-08-28 DIAGNOSIS — E78.00 PURE HYPERCHOLESTEROLEMIA: Primary | ICD-10-CM

## 2023-08-28 PROCEDURE — 99214 OFFICE O/P EST MOD 30 MIN: CPT | Performed by: INTERNAL MEDICINE

## 2023-08-28 NOTE — PROGRESS NOTES
SERVICE DATE: 8/28/2023    PRIMARY CARE PROVIDER:  Shannon Arciniega  [address] [place] [address]    REASON FOR VISIT:  Followup of hypertension and hyperlipidemia.     HISTORY OF PRESENT ILLNESS:  It was my pleasure to follow up with Julia, who is a delightful, younger appearing 62-year-old  lady, nonobese, retired  in [place], lives with her .  She is a never tobacco user.       Julia is known to have pure hypercholesterolemia with very high LDL of 175 before statin therapy, family history of premature coronary artery disease (father had nonfatal myocardial infarctions starting in his 30s with subsequent stenting and mother had hypertension and hypercholesterolemia), angiographically normal coronary arteries on angiogram dated 8/11/2022, benign essential hypertension (on losartan 25 mg monotherapy), never tobacco user, osteoporosis, normal BMI of 24 kg/m2.    She is on rosuvastatin 20 mg daily, tolerating it well, total cholesterol 165, HDL 62, LDL 82, triglycerides 107, Normal liver panel.    Hypertension well controlled at 122/79 on losartan 25 mg daily, pulse 80 bpm.  At her last visit a few months ago I stopped the carvedilol due to side effects of fatigue and started her on losartan 25 mg daily which she is tolerating well.  Fatigue resolved with carvedilol discontinuation. Normal renal panel with a creatinine of 0.89.    Coronary angiogram dated 8/11/2022 reviewed.  Angiographically normal coronary arteries.    Transthoracic echocardiogram dated 8/10/2022 reviewed.  Normal LVEF of 60%, normal right ventricular systolic function, normal cardiac valves.    DIAGNOSES/ASSESSMENT:  1.  Familial hyperlipidemia with goal LDL less than 100.  LDL is 82 on 20 mg of atorvastatin.  2.  Benign essential hypertension. Her home systolic blood pressure fluctuates between the 100-120s.  She is motivated to continue to exercise, make lifestyle modifications and  "hopefull be able to come off the losartan completely in a few months.  3.  Angiographically normal coronary arteries.    PLAN:  1.  Decrease losartan to 12.5 mg daily in the morning.    2.  Had a detailed conversation about a robust exercise program. I suspect Julia has prehypertension and as long as she is able to maintain a regular exercise program (both strength and aerobic training because she has osteoporosis), I may be able to wean her off Losartan completely.  She is motivated to make these changes.  3.  Continue atorvastatin 40 mg daily long-term.  4.  Follow-up with me in 6 months.      Sienna Alston MD      Established patient.   The level of medical decision making during this visit was of moderate complexity.      PHYSICAL EXAMINATION:  Vitals: /79   Pulse 80   Ht 1.651 m (5' 5\")   Wt 66.2 kg (146 lb)   SpO2 98%   BMI 24.30 kg/m    Wt Readings from Last 5 Encounters:   08/28/23 66.2 kg (146 lb)   07/28/23 64.4 kg (142 lb)   02/24/23 67.1 kg (148 lb)   09/08/22 66.4 kg (146 lb 4.8 oz)   08/11/22 64.4 kg (142 lb)         Encounter Diagnoses   Name Primary?    Pure hypercholesterolemia Yes    Benign essential hypertension          CURRENT MEDICATIONS:  Current Outpatient Medications   Medication Sig Dispense Refill    CALCIUM-VITAMIN D PO Take 1 tablet by mouth 2 times daily      cetirizine (ZYRTEC) 10 MG tablet Take 10 mg by mouth daily      losartan (COZAAR) 25 MG tablet Take 1 tablet (25 mg) by mouth every morning 100 tablet 5    rosuvastatin (CRESTOR) 20 MG tablet Take 1 tablet (20 mg) by mouth daily 100 tablet 5    triamcinolone (KENALOG) 0.1 % external cream Apply topically 2 times daily 45 g 0    nitroGLYcerin (NITROSTAT) 0.4 MG sublingual tablet For chest pain place 1 tablet under the tongue every 5 minutes for 3 doses. If symptoms persist 5 minutes after 1st dose call 911. (Patient not taking: Reported on 8/28/2023) 15 tablet 4         ALLERGIES:  Allergies   Allergen Reactions    " Boniva [Ibandronic Acid] Other (See Comments) and Hives     Tingling of lips      Fosamax [Alendronate] Swelling     Lips and throat swelling    Iodine Hives    Latex        PAST MEDICAL HISTORY:    Past Medical History:   Diagnosis Date    Abnormal cardiovascular stress test 08/02/2022    Arthritis 1994    Depression, prolonged     Cyclical related to 's health    Hyperlipidemia     One high reading late Fall 2016    Idiopathic osteoporosis 03/08/2023    Osteopenia        PAST SURGICAL HISTORY:    Past Surgical History:   Procedure Laterality Date    COLONOSCOPY  08/2019    CV CORONARY ANGIOGRAM N/A 08/11/2022    Procedure: Coronary Angiogram;  Surgeon: Diana Smith MD;  Location[facility]  [facility]    ORTHOPEDIC SURGERY  05/1997    ZZC LT SHOULDER G/E 2 VIEWS  01/01/1999 1997, 1999 - decompression, clavicle resection       FAMILY HISTORY:    Family History   Problem Relation Age of Onset    Osteoporosis Mother     Dementia Mother     Hyperlipidemia Mother     Hypertension Father     Cerebrovascular Disease Father     Coronary Artery Disease Father     Diabetes Father     Hyperlipidemia Father     Thyroid Disease Father     Coronary Artery Disease Brother     No Known Problems Maternal Grandmother     No Known Problems Maternal Grandfather     Myocardial Infarction Paternal Grandmother     Obesity Paternal Grandmother        SOCIAL HISTORY:    Social History     Socioeconomic History    Marital status:      Spouse name: None    Number of children: None    Years of education: None    Highest education level: None   Occupational History    Occupation: Retired .   Tobacco Use    Smoking status: Never    Smokeless tobacco: Never   Vaping Use    Vaping Use: Never used   Substance and Sexual Activity    Alcohol use: Yes     Comment: 2-3 drinks/month, socially    Drug use: No    Sexual activity: Yes     Partners: Male     Birth control/protection: None   Other Topics  Concern    Parent/sibling w/ CABG, MI or angioplasty before 65F 55M? 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     Social Determinants of Health     Financial Resource Strain: Low Risk  (7/21/2023)    Overall Financial Resource Strain (CARDIA)     Difficulty of Paying Living Expenses: Not hard at all   Food Insecurity: No Food Insecurity (7/21/2023)    Hunger Vital Sign     Worried About Running Out of Food in the Last Year: Never true     Ran Out of Food in the Last Year: Never true   Transportation Needs: No Transportation Needs (7/21/2023)    PRAPARE - Transportation     Lack of Transportation (Medical): No     Lack of Transportation (Non-Medical): No   Physical Activity: Sufficiently Active (7/21/2023)    Exercise Vital Sign     Days of Exercise per Week: 6 days     Minutes of Exercise per Session: 40 min   Stress: No Stress Concern Present (7/21/2023)    [employer] - Occupational Stress Questionnaire     Feeling of Stress : Not at all   Social Connections: Moderately Integrated (7/21/2023)    Social Connection and Isolation Panel [NHANES]     Frequency of Communication with Friends and Family: More than three times a week     Frequency of Social Gatherings with Friends and Family: Once a week     Attends Mosque Services: 1 to 4 times per year     Active Member of Clubs or Organizations: No     Marital Status:    Housing Stability: Low Risk  (7/21/2023)    Housing Stability Vital Sign     Unable to Pay for Housing in the Last Year: No     Number of Places Lived in the Last Year: 1     Unstable Housing in the Last Year: No

## 2023-11-08 ENCOUNTER — ALLIED HEALTH/NURSE VISIT (OUTPATIENT)
Dept: PEDIATRICS | Facility: CLINIC | Age: 63
End: 2023-11-08
Payer: MEDICARE

## 2023-11-08 ENCOUNTER — TELEPHONE (OUTPATIENT)
Dept: PEDIATRICS | Facility: CLINIC | Age: 63
End: 2023-11-08

## 2023-11-08 DIAGNOSIS — M81.0 AGE-RELATED OSTEOPOROSIS WITHOUT CURRENT PATHOLOGICAL FRACTURE: Primary | ICD-10-CM

## 2023-11-08 PROCEDURE — 96372 THER/PROPH/DIAG INJ SC/IM: CPT | Performed by: INTERNAL MEDICINE

## 2023-11-08 PROCEDURE — 99207 PR NO CHARGE NURSE ONLY: CPT

## 2023-11-08 NOTE — PROGRESS NOTES
Clinic Administered Medication Documentation      Prolia Documentation    Indication: Prolia  (denosumab) is a prescription medicine used to treat osteoporosis in patients who:   Are at high risk for fracture, meaning patients who have had a fracture related to osteoporosis, or who have multiple risk factors for fracture.  Cannot use another osteoporosis medicine or other osteoporosis medicines did not work well.  The timeline for early/late injections would be 4 weeks early and any time after the 6 month johnna. If a patient receives their injection late, then the subsequent injection would be 6 months from the date that they actually received the injection.    When was the last injection?  23  Was the last injection at least 6 months ago? Yes  Has the prior authorization been completed?  Yes  Is there an active order (written within the past 365 days, with administrations remaining, not ) in the chart?  Yes  Patient denies any dental work involving the bone (e.g. tooth extraction or dental implants) in the past 4 weeks?  Yes  Patient denies plans for any dental work involving the bone (e.g. tooth extraction or dental implants) in the next 4 weeks? Yes    The following steps were completed to comply with the REMS program for Prolia:  Reviewed information in the Medication Guide and Patient Counseling Chart, including the serious risks of Prolia  and the symptoms of each risk.  Advised patient to seek prompt medical attention if they have signs or symptoms of any of the serious risks.  Provided each patient a copy of the Medication Guide and Patient Brochure.    Prior to injection, verified patient identity using patient's name and date of birth. Medication was administered. Please see MAR and medication order for additional information. Patient instructed to remain in clinic for 15 minutes and report any adverse reaction to staff immediately.    Vial/Syringe: Single dose vial. Was entire vial of medication  used? Yes  Was this medication supplied by the patient? No  Verified that the patient has refills remaining in their prescription.    Marisol Daniel RN

## 2023-11-08 NOTE — TELEPHONE ENCOUNTER
Last Prolia 11/8/23 right arm  Next Prolia due: 5/8/24    Encounter postponed 5 months to call schedule and verify coverage.    Marisol Daniel RN

## 2024-02-21 ENCOUNTER — LAB (OUTPATIENT)
Dept: LAB | Facility: CLINIC | Age: 64
End: 2024-02-21
Payer: MEDICARE

## 2024-02-21 DIAGNOSIS — E78.00 PURE HYPERCHOLESTEROLEMIA: ICD-10-CM

## 2024-02-21 DIAGNOSIS — I10 BENIGN ESSENTIAL HYPERTENSION: ICD-10-CM

## 2024-02-21 LAB
CHOLEST SERPL-MCNC: 171 MG/DL
FASTING STATUS PATIENT QL REPORTED: YES
HDLC SERPL-MCNC: 59 MG/DL
LDLC SERPL CALC-MCNC: 90 MG/DL
NONHDLC SERPL-MCNC: 112 MG/DL
TRIGL SERPL-MCNC: 111 MG/DL

## 2024-02-21 PROCEDURE — 36415 COLL VENOUS BLD VENIPUNCTURE: CPT

## 2024-02-21 PROCEDURE — 80061 LIPID PANEL: CPT

## 2024-02-26 ENCOUNTER — OFFICE VISIT (OUTPATIENT)
Dept: CARDIOLOGY | Facility: CLINIC | Age: 64
End: 2024-02-26
Attending: INTERNAL MEDICINE
Payer: MEDICARE

## 2024-02-26 VITALS
BODY MASS INDEX: 24.99 KG/M2 | HEART RATE: 76 BPM | DIASTOLIC BLOOD PRESSURE: 71 MMHG | WEIGHT: 150 LBS | SYSTOLIC BLOOD PRESSURE: 124 MMHG | HEIGHT: 65 IN | OXYGEN SATURATION: 97 %

## 2024-02-26 DIAGNOSIS — E78.00 PURE HYPERCHOLESTEROLEMIA: Primary | ICD-10-CM

## 2024-02-26 PROCEDURE — 99214 OFFICE O/P EST MOD 30 MIN: CPT | Performed by: INTERNAL MEDICINE

## 2024-02-26 RX ORDER — ROSUVASTATIN CALCIUM 20 MG/1
20 TABLET, COATED ORAL DAILY
Qty: 100 TABLET | Refills: 5 | Status: CANCELLED | OUTPATIENT
Start: 2024-02-26

## 2024-02-26 RX ORDER — LOSARTAN POTASSIUM 25 MG/1
12.5 TABLET ORAL EVERY MORNING
Qty: 100 TABLET | Refills: 5 | Status: CANCELLED | OUTPATIENT
Start: 2024-02-26

## 2024-02-26 NOTE — PROGRESS NOTES
SERVICE DATE: February 26, 2024      PRIMARY CARE PROVIDER:  Shannon Arciniega  [address] [place] [address]    REASON FOR VISIT:  Follow-up of hypertension and hyperlipidemia.    HISTORY OF PRESENT ILLNESS:  Julia Key is 63 year old female, who is a delightful, younger appearing 62-year-old lady, nonobese, retired  in [place], lives with her .  She is a never tobacco user.       Julia is known to have pure hypercholesterolemia with very high LDL of 175 before statin therapy, angiographically normal coronary arteries in 2022, hypertension, osteoporosis, never tobacco use, normal BMI of 24, family history of premature coronary artery disease.    She has been tolerating rosuvastatin 20 mg without any issues.  She has regular exercise, swims most days of the week, does strength training, walks and says she feels so much better.  Fatigue has resolved.  Her lipid panel looks excellent.  Total cholesterol is 171, HDL 59, LDL 90, triglycerides 111, normal renal panel, normal TSH, normal CBC, normal liver panel.    At her last visit, I had started her on low-dose losartan for hypertension.  With lifestyle modification, her blood pressure is markedly improved to 124/71 and at home it is even lower.  Her echocardiogram shows preserved biventricular systolic function, LVEF 60%, normal right ventricular systolic function, no valve disease.    DIAGNOSES/ASSESSMENT:  Pure hypercholesterolemia.  Excellent lipid panel, angiographically normal coronary arteries, exercises regularly.  Okay to stop the rosuvastatin, per patient preference.  Recheck lipid panel in 6 months.  Normotension.  With lifestyle modification, blood pressure has normalized.  Stop losartan 12.5 mg daily.  Angiographically normal coronary arteries in 2022.    PLAN:  She has undertaken commendable lifestyle modification with regular exercise and dietary modification.  Blood pressure and lipid panel have  "normalized.  Okay to stop the rosuvastatin and losartan.  Reassess in 6 months with fasting lipid panel.        Sienna Alston MD      Established patient.   The level of medical decision making during this visit was of moderate complexity.          Vitals: /71   Pulse 76   Ht 1.651 m (5' 5\")   Wt 68 kg (150 lb)   SpO2 97%   BMI 24.96 kg/m    Wt Readings from Last 5 Encounters:   02/26/24 68 kg (150 lb)   08/28/23 66.2 kg (146 lb)   07/28/23 64.4 kg (142 lb)   02/24/23 67.1 kg (148 lb)   09/08/22 66.4 kg (146 lb 4.8 oz)         Orders Placed This Encounter   Procedures    Lipid Profile    Follow-Up with Cardiology           CURRENT MEDICATIONS:  Current Outpatient Medications   Medication Sig Dispense Refill    CALCIUM-VITAMIN D PO Take 1 tablet by mouth 2 times daily      cetirizine (ZYRTEC) 10 MG tablet Take 10 mg by mouth daily      triamcinolone (KENALOG) 0.1 % external cream Apply topically 2 times daily 45 g 0         ALLERGIES:  Allergies   Allergen Reactions    Boniva [Ibandronic Acid] Other (See Comments) and Hives     Tingling of lips      Fosamax [Alendronate] Swelling     Lips and throat swelling    Iodine Hives    Latex        PAST MEDICAL HISTORY:    Past Medical History:   Diagnosis Date    Arthritis 1994    Depression, prolonged     Cyclical related to 's health    Hyperlipidemia     One high reading late Fall 2016    Idiopathic osteoporosis 03/08/2023    Osteopenia        PAST SURGICAL HISTORY:    Past Surgical History:   Procedure Laterality Date    COLONOSCOPY  08/2019    CV CORONARY ANGIOGRAM N/A 08/11/2022    Procedure: Coronary Angiogram;  Surgeon: Diana Smith MD;  Location:  HEART CARDIAC CATH LAB    ORTHOPEDIC SURGERY  05/1997    [facility] LT SHOULDER G/E 2 VIEWS  01/01/1999 1997, 1999 - decompression, clavicle resection       FAMILY HISTORY:    Family History   Problem Relation Age of Onset    Osteoporosis Mother     Dementia Mother     Hyperlipidemia Mother     " Hypertension Father     Cerebrovascular Disease Father     Coronary Artery Disease Father 30    Diabetes Father     Hyperlipidemia Father     Thyroid Disease Father     Coronary Artery Disease Brother     No Known Problems Maternal Grandmother     No Known Problems Maternal Grandfather     Myocardial Infarction Paternal Grandmother     Obesity Paternal Grandmother                           Protopic Counseling: Patient may experience a mild burning sensation during topical application. Protopic is not approved in children less than 2 years of age. There have been case reports of hematologic and skin malignancies in patients using topical calcineurin inhibitors although causality is questionable.

## 2024-02-26 NOTE — LETTER
2/26/2024    Shannon Arciniega, APRN CNP  [address] [place] [address]    RE: Julia Key       Dear Colleague,     [facility] the [facility].    SERVICE DATE: February 26, 2024      PRIMARY CARE PROVIDER:  Shannon Arciniega  [address] [place] [address]    REASON FOR VISIT:  Follow-up of hypertension and hyperlipidemia.    HISTORY OF PRESENT ILLNESS:  Julia Key is 63 year old female, who is a delightful, younger appearing 62-year-old lady, nonobese, retired  in [place], lives with her .  She is a never tobacco user.       Julia is known to have pure hypercholesterolemia with very high LDL of 175 before statin therapy, angiographically normal coronary arteries in 2022, hypertension, osteoporosis, never tobacco use, normal BMI of 24, family history of premature coronary artery disease.    She has been tolerating rosuvastatin 20 mg without any issues.  She has regular exercise, swims most days of the week, does strength training, walks and says she feels so much better.  Fatigue has resolved.  Her lipid panel looks excellent.  Total cholesterol is 171, HDL 59, LDL 90, triglycerides 111, normal renal panel, normal TSH, normal CBC, normal liver panel.    At her last visit, I had started her on low-dose losartan for hypertension.  With lifestyle modification, her blood pressure is markedly improved to 124/71 and at home it is even lower.  Her echocardiogram shows preserved biventricular systolic function, LVEF 60%, normal right ventricular systolic function, no valve disease.    DIAGNOSES/ASSESSMENT:  Pure hypercholesterolemia.  Excellent lipid panel, angiographically normal coronary arteries, exercises regularly.  Okay to stop the rosuvastatin, per patient preference.  Recheck lipid panel in 6 months.  Normotension.  With lifestyle modification, blood pressure has normalized.  Stop losartan 12.5  "mg daily.  Angiographically normal coronary arteries in 2022.    PLAN:  She has undertaken commendable lifestyle modification with regular exercise and dietary modification.  Blood pressure and lipid panel have normalized.  Okay to stop the rosuvastatin and losartan.  Reassess in 6 months with fasting lipid panel.        Sienna Alston MD      Established patient.   The level of medical decision making during this visit was of moderate complexity.          Vitals: /71   Pulse 76   Ht 1.651 m (5' 5\")   Wt 68 kg (150 lb)   SpO2 97%   BMI 24.96 kg/m    Wt Readings from Last 5 Encounters:   02/26/24 68 kg (150 lb)   08/28/23 66.2 kg (146 lb)   07/28/23 64.4 kg (142 lb)   02/24/23 67.1 kg (148 lb)   09/08/22 66.4 kg (146 lb 4.8 oz)         Orders Placed This Encounter   Procedures    Lipid Profile    Follow-Up with Cardiology           CURRENT MEDICATIONS:  Current Outpatient Medications   Medication Sig Dispense Refill    CALCIUM-VITAMIN D PO Take 1 tablet by mouth 2 times daily      cetirizine (ZYRTEC) 10 MG tablet Take 10 mg by mouth daily      triamcinolone (KENALOG) 0.1 % external cream Apply topically 2 times daily 45 g 0         ALLERGIES:  Allergies   Allergen Reactions    Boniva [Ibandronic Acid] Other (See Comments) and Hives     Tingling of lips      Fosamax [Alendronate] Swelling     Lips and throat swelling    Iodine Hives    Latex        PAST MEDICAL HISTORY:    Past Medical History:   Diagnosis Date    Arthritis 1994    Depression, prolonged     Cyclical related to 's health    Hyperlipidemia     One high reading late Fall 2016    Idiopathic osteoporosis 03/08/2023    Osteopenia        PAST SURGICAL HISTORY:    Past Surgical History:   Procedure Laterality Date    COLONOSCOPY  08/2019    CV CORONARY ANGIOGRAM N/A 08/11/2022    Procedure: Coronary Angiogram;  Surgeon: Diana Smith MD;  Location:  HEART CARDIAC CATH LAB    ORTHOPEDIC SURGERY  05/1997    [facility] LT SHOULDER G/E 2 " VIEWS  01/01/1999 1997, 1999 - decompression, clavicle resection       FAMILY HISTORY:    Family History   Problem Relation Age of Onset    Osteoporosis Mother     Dementia Mother     Hyperlipidemia Mother     Hypertension Father     Cerebrovascular Disease Father     Coronary Artery Disease Father 30    Diabetes Father     Hyperlipidemia Father     Thyroid Disease Father     Coronary Artery Disease Brother     No Known Problems Maternal Grandmother     No Known Problems Maternal Grandfather     Myocardial Infarction Paternal Grandmother     Obesity Paternal Grandmother        Thank you for allowing me to participate in the care of your patient.      Sincerely,     Sienna Alston MD     [facility]  cc:   Sienna Alston MD  [address] [place] [id number]

## 2024-04-08 ENCOUNTER — VIRTUAL VISIT (OUTPATIENT)
Dept: ENDOCRINOLOGY | Facility: CLINIC | Age: 64
End: 2024-04-08
Payer: MEDICARE

## 2024-04-08 ENCOUNTER — MYC MEDICAL ADVICE (OUTPATIENT)
Dept: ENDOCRINOLOGY | Facility: CLINIC | Age: 64
End: 2024-04-08

## 2024-04-08 VITALS — WEIGHT: 145 LBS | BODY MASS INDEX: 24.13 KG/M2

## 2024-04-08 DIAGNOSIS — M81.0 AGE RELATED OSTEOPOROSIS, UNSPECIFIED PATHOLOGICAL FRACTURE PRESENCE: ICD-10-CM

## 2024-04-08 DIAGNOSIS — M81.8 IDIOPATHIC OSTEOPOROSIS: Primary | ICD-10-CM

## 2024-04-08 DIAGNOSIS — Z92.29 HX DRUG THERAPY: ICD-10-CM

## 2024-04-08 PROCEDURE — G2211 COMPLEX E/M VISIT ADD ON: HCPCS | Mod: 95 | Performed by: INTERNAL MEDICINE

## 2024-04-08 PROCEDURE — 99214 OFFICE O/P EST MOD 30 MIN: CPT | Mod: 95 | Performed by: INTERNAL MEDICINE

## 2024-04-08 NOTE — PROGRESS NOTES
"THIS IS A VIDEO VISIT:    Phone call visit/virtual visit encounter:    Name of patient: Julia Key    Date of encounter: 4/8/2024    Time of start of video visit: 8:33    Video started: 8:38    Video ended: 8:48    Provider location: working from home/ [facility]    Patient location: patients home.    Mode of transmission: [employer] video/ [employer]    Verbal consent: obtained before starting visit. Pt is agreeable.      The patient has been notified of following:      \"This VIDEO visit will be conducted via a call between you and your physician/provider. We have found that certain health care needs can be provided without the need for a physical exam.  This service lets us provide the care you need with a short phone conversation.  If a prescription is necessary we can send it directly to your pharmacy.  If lab work is needed we can place an order for that and you can then stop by our lab to have the test done at a later time.     With new updates with corona virus patient might be billed as clinic visit.     If during the course of the call the physician/provider feels a telephone visit is not appropriate, you will not be charged for this service.\"      Past medical history, social history, family history, allergy and medications were reviewed and updated as appropriate.  Reviewed pertinent labs, notes, imaging studies personally.   Name: Julia Key  Date: 4/8/2024  Seen for f/u of osteoporosis.     HPI:  Julia Key is a 63 year old female who presents for the evaluation of osteoporosis.   has a past medical history of Arthritis (1994), Depression, prolonged, Hyperlipidemia, Idiopathic osteoporosis (03/08/2023), and Osteopenia.    She was diagnosed with osteopenia few years back and then osteoporosis with DEXA 7/2022.    DEXA 2015: Osteopenia.    DEXA 7/2022:Osteoporosis. There has been probable significant decrease in bone density of the lumbar spine and of the hip(s).     Started PROLIA 2023. " Tolerating well.      RISK FACTORS:  Post-menopausal, Parent history of osteoporosis, Parent history of a hip fracture, follow up osteopenia.  Started Fosamax 12/2022 but was not able to tolerate FOSAMAX 2/2 to GI s/e like severe abdominal pain.  Trial of BONIVA 1/2023-- but had face flushing, tingling lips and nausea.+ skin reaction.  Plan was for reclast but it was discontinued in the setting of above.  Started PROLIA 2023. Tolerating well.    PROLIA dates: (at [place])  11/2023 5/2023    GERD: yes- under control with diet only. Not on medication.    Dental health: normal. No major dental procedures planned.    Kidney function: within normal limits    Previous treatment for osteopenia/osteoporosis: previous Fosamax for one month in 2013 (she had dizziness on it and GI problems) and Boniva    Current treatment for Osteopenia/Osteoporosis: Calcium, Vitamin D    Smoke:No  Family History:Yes: mother with osteoporosis  Menstrual history/Birthing: s/p menopause. Took HRT for about 6 years. Last use was May 2020.  Fractures:No  Kidney stones: No  GI Surgery:No  Duration of therapy:  As noted above  Exercise:Yes: 4-5 times a week- 30-60 min each time.  Diet:  0-1 servings of dairy/day  Ca/Vitamin D: 1200 mg of calcium/day, 2000 international unit(s) of vit D/day  Alcohol:  No  Eating Disorder: No  Steroid Use:  No  PMH/PSH:  Past Medical History:   Diagnosis Date    Arthritis 1994    Depression, prolonged     Cyclical related to 's health    Hyperlipidemia     One high reading late Fall 2016    Idiopathic osteoporosis 03/08/2023    Osteopenia      Past Surgical History:   Procedure Laterality Date    COLONOSCOPY  08/2019    CV CORONARY ANGIOGRAM N/A 08/11/2022    Procedure: Coronary Angiogram;  Surgeon: Diana Smith MD;  Location:  HEART CARDIAC CATH LAB    ORTHOPEDIC SURGERY  05/1997    [facility] LT SHOULDER G/E 2 VIEWS  01/01/1999 1997, 1999 - decompression, clavicle resection     Family Hx:  Family History    Problem Relation Age of Onset    Osteoporosis Mother     Dementia Mother     Hyperlipidemia Mother     Hypertension Father     Cerebrovascular Disease Father     Coronary Artery Disease Father 30    Diabetes Father     Hyperlipidemia Father     Thyroid Disease Father     Coronary Artery Disease Brother     No Known Problems Maternal Grandmother     No Known Problems Maternal Grandfather     Myocardial Infarction Paternal Grandmother     Obesity Paternal Grandmother        Social Hx:  Social History     Socioeconomic History    Marital status:      Spouse name: Not on file    Number of children: Not on file    Years of education: Not on file    Highest education level: Not on file   Occupational History    Occupation: Retired .   Tobacco Use    Smoking status: Never    Smokeless tobacco: Never   Vaping Use    Vaping Use: Never used   Substance and Sexual Activity    Alcohol use: Yes     Comment: 2-3 drinks/month, socially    Drug use: No    Sexual activity: Yes     Partners: Male     Birth control/protection: None   Other Topics Concern    Parent/sibling w/ CABG, MI or angioplasty before 65F 55M? 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 One daughter, one dog.     Social Determinants of Health     Financial Resource Strain: Low Risk  (7/21/2023)    Overall Financial Resource Strain (CARDIA)     Difficulty of Paying Living Expenses: Not hard at all   Food Insecurity: No Food Insecurity (7/21/2023)    Hunger Vital Sign     Worried About Running Out of Food in the Last Year: Never true     Ran Out of Food in the Last Year: Never true   Transportation Needs: No Transportation Needs (7/21/2023)    PRAPARE - Transportation     Lack of Transportation (Medical): No     Lack of Transportation (Non-Medical): No   Physical Activity: Sufficiently Active (7/21/2023)    Exercise Vital Sign     Days of Exercise per Week: 6  days     Minutes of Exercise per Session: 40 min   Stress: No Stress Concern Present (7/21/2023)    [employer] - Occupational Stress Questionnaire     Feeling of Stress : Not at all   Social Connections: Moderately Integrated (7/21/2023)    Social Connection and Isolation Panel [NHANES]     Frequency of Communication with Friends and Family: More than three times a week     Frequency of Social Gatherings with Friends and Family: Once a week     Attends Confucianist Services: 1 to 4 times per year     Active Member of Clubs or Organizations: No     Attends Club or Organization Meetings: Not on file     Marital Status:    Interpersonal Safety: Not on file   Housing Stability: Low Risk  (7/21/2023)    Housing Stability Vital Sign     Unable to Pay for Housing in the Last Year: No     Number of Places Lived in the Last Year: 1     Unstable Housing in the Last Year: No          MEDICATIONS:  has a current medication list which includes the following prescription(s): calcium-vitamin d, cetirizine, and triamcinolone.    ROS     ROS: 10 point ROS neg other than the symptoms noted above in the HPI.    Physical Exam   VS: Wt 65.8 kg (145 lb)   BMI 24.13 kg/m    GENERAL: healthy, alert and no distress  EYES: Eyes grossly normal to inspection, conjunctivae and sclerae normal  ENT: no nose swelling, nasal discharge.  Thyroid: no apparent thyroid nodules  RESP: no audible wheeze, cough, or visible cyanosis.  No visible retractions or increased work of breathing.  Able to speak fully in complete sentences.  ABDO: not evaluated.  EXTREMITIES: no hand tremors.  NEURO: Cranial nerves grossly intact, mentation intact and speech normal  SKIN: No apparent skin lesions, rash or edema seen   PSYCH: mentation appears normal, affect normal/bright, judgement and insight intact, normal speech and appearance well-groomed      LABS:  Calcium:  ENDO CALCIUM LABS-[facility] Latest Ref Rng & Units 8/11/2022   CALCIUM  8.5 - 10.1 mg/dL 9.4     Creatinine:  Creatinine   Date Value Ref Range Status   08/23/2023 0.89 0.51 - 0.95 mg/dL Final   03/29/2021 0.76 0.52 - 1.04 mg/dL Final       TFTs:  Lab Results   Component Value Date    TSH 3.01 12/16/2016       PTH:  Vitamin D:  Vitamin D Deficiency Screening Results:  Lab Results   Component Value Date    VITDT 45 12/21/2022       BoneTurnOverMarkers:    DEXA 7/2022:  FINDINGS:               Lumbar Spine (L1-L4)      T-score:  -2.5, degenerative changes present               Left Femoral Neck            T-score:  -1.7               Right Femoral Neck          T-score:  -1.3                  Lumbar (L1-L4) BMD: 0.889  Previous: 1.021                          Total Hip Mean BMD: 0.832  Previous: 0.880     Comparison is made to another DXA performed on a different [employer] machine on 07/27/2015.       IMPRESSION  Osteoporosis., Degenerative changes of the lumbar spine which may falsely elevate results.     There has been probable significant decrease in bone density of the lumbar spine. There has been probable significant decrease in bone density of the hip(s).        All pertinent notes, labs, and images personally reviewed by me.     A/P  Ms.Mary ERIKA Key is a 63 year old here for the evaluation of:    Age-related osteoporosis without current pathological fracture  Comment: Risk factors for low bone density include personal history of fracture or family history, , female, Estrogen deficiency.   DEXA 7/2022:Osteoporosis. There has been probable significant decrease in bone density of the lumbar spine and of the hip(s).   Normal calcium, creatinine and vit D.  Not able to tolerate FOSAMAX and BONIVA as noted above.  On Prolia since 5/2023 and tolerating ok.  Plan:   Discussed diagnosis, pathophysiology, management and treatment options of condition with pt.  Plan to continue PROLIA.  Next PROLIA 5/2024, 11/2024 ( based on DEXA)  DEXA 7/2024 onwards. ( At [facility])  Follow up after  that.    Plan: denosumab (PROLIA) injection 60 mg, Calcium,         Creatinine, DX Bone Density        Discussed indications, risks and benefits of all medications prescribed, and answered questions to patient's satisfaction.    The pt was advised to  Maintain an adequate calcium and vitamin D intake and to supplement vitamin D if needed to maintain serum levels of 25 hydroxy D (25 OH D) between 30-60 ng/ml.  Limit alcohol intake to no more than 2 servings per day.  Limit caffeine intake.  Maintain an active lifestyle including weight-bearing exercises for at least 30 mins daily.  Take measures to reduce the risk of falling.    Possible major side effects of Denosumab (PROLIA) include risk for atypical femur fractures, hypersensitivity, low calcium levels, osteonecrosis of jaw (which can manifest as jaw pain, osteomyelitis, osteitis, bone erosion, tooth/periodontal infection, toothache, gingival ulceration/erosion). Other s/e include but not limited to Hypertension, Headache and peripheral edema.   Always let your dentist lnow about the medication if plan for major dental procedure.    Indication: Prolia  (denosumab) is a prescription medicine used to treat osteoporosis in patients who:   Are at high risk for fracture, meaning patients who have had a fracture related to osteoporosis, or who have multiple risk factors for fracture   Cannot use another osteoporosis medicine or other osteoporosis medicines did not work well   The timeline for early/late injections would be 4 weeks early and any time after the 6 month johnna. If a patient receives their injection late, then the subsequent injection would be 6 months from the date that they actually received the injection      Discussed indications, risks and benefits of all medications prescribed, and answered questions to patient's satisfaction.  The longitudinal plan of care for the diagnosis(es)/condition(s) as documented were addressed during this visit. Due to the  added complexity in care, I will continue to support Julia in the subsequent management and with ongoing continuity of care.    All questions were answered.  The patient indicates understanding of the above issues and agrees with the plan set forth.      Follow-up:  As noted in AVS    Yessica Rose MD  Endocrinology  [facility]/[place]  CC: Shannon Arciniega

## 2024-04-08 NOTE — PATIENT INSTRUCTIONS
[facility]  Dr Rose, Endocrinology Department    [place] [address] [id number]  [place] [address]  Appointment [phone]  Fax: [phone]  [place]: Monday - Thursday      Plan to continue PROLIA.  Next PROLIA 2024, 2024 ( based on DEXA)  DEXA 2024 onwards. ( At [place])  Follow up after that.    PROLIA Scheduling information:  It will be done in clinic.   You need to make nurse only appointment. (call [place]: [phone] or [place]:[phone] and schedule Nurse only appointment)  You will need labs few days prior to PROLIA (calcium labs)- please schedule lab appointment.  PROLIA is every 6 months injection- so please schedule accordingly.  It is recommended NOT to miss or delay PROLIA injections.    Possible major side effects of Denosumab (PROLIA) include risk for atypical femur fractures, hypersensitivity, low calcium levels, osteonecrosis of jaw (which can manifest as jaw pain, osteomyelitis, osteitis, bone erosion, tooth/periodontal infection, toothache, gingival ulceration/erosion). Other s/e include but not limited to Hypertension, Headache and peripheral edema.   Always let your dentist lnow about the medication if plan for major dental procedure.    Indication: Prolia  (denosumab) is a prescription medicine used to treat osteoporosis in patients who:   Are at high risk for fracture, meaning patients who have had a fracture related to osteoporosis, or who have multiple risk factors for fracture   Cannot use another osteoporosis medicine or other osteoporosis medicines did not work well   The timeline for early/late injections would be 4 weeks early and any time after the 6 month johnna. If a patient receives their injection late, then the subsequent injection would be 6 months from the date that they actually received the injection    The pt was advised to  Maintain an adequate calcium and vitamin D intake and to supplement  vitamin D if needed to maintain serum levels of 25 hydroxy D (25 OH D) between 30-60 ng/ml.  Limit alcohol intake to no more than 2 servings per day.  Limit caffeine intake.  Maintain an active lifestyle including weight-bearing exercises for at least 30 mins daily.  Take measures to reduce the risk of falling.     You should get 1000- 1200 mg/day calcium in divided doses of no more than 500 mg/dose.  This INCLUDES what is in your food as well as what is in any supplements you may be taking.    Vit D about 800-1000 IU/day ( unless you have vit D deficiency- in that case higher dose)  Dietary sources of calcium:: These also contain vitamin D  Milk                            8 oz            300 mg calcium  Yogurt                          1 cup           400 mg calcium   Hard cheese                     1.5 oz          300 mg  Cottage cheese                  2 cup           300 mg  Orange juice with Calcium       8 oz            300 mg  Low fat dairy sources are recommended        You should get 30 minutes of moderate weight bearing exercise on most days of the week .  Weight bearing exercise includes such things as walking, jogging, hiking, dancing.  You should also get Strength training 2 or more times/week in addition to other weight -being exercise. Strength training uses weight or resistance beyond that seen in everyday activities -(pilates, weight training with free weights, weight machines or resistance bands)     Living with Osteoporosis: Preventing Fractures  If you have osteoporosis, you can do a lot to reduce its effect on your life. Knowing how to prevent fractures and spinal curvature can help you live more comfortably and safely with this disease.  Reducing your risk for fractures  The most common fracture sites in people with osteoporosis are the wrist, spine, and hip. These fractures are often caused by accidents and falls. All fractures are painful and may limit what you can do. But hip fractures are  very serious. They often need surgery, and it can take months to recover. To reduce your risk for fractures:  Get regular exercise. Try walking, swimming, or weight training.  Eat foods that are rich in calcium, or take calcium supplements.  Make your home safe to help avoid accidents.  Take extra precautions not to fall in risky areas, such as icy sidewalks.  Understanding spinal fractures  Your spine is made up of many bones called vertebrae. Osteoporosis can cause the vertebrae in your spine to collapse. As a result, your upper back may arch forward, creating a curvature. Spine fractures may also result from back strain and bad posture. You will also lose height. Your lower spine must then adjust to keep your body balanced. This can cause back pain. To prevent or lessen these spinal changes:  Practice good posture.  Use proper techniques if you need to lift heavy objects.  Do back exercises to help your posture.  Lie on your back when you have pain.  Ask your healthcare provider about these and other ways to help your spine.  [employer] last reviewed this educational content on 5/1/2018 2000-2021 [employer]. All rights reserved. This information is not intended as a substitute for professional medical care. Always follow your healthcare professional's instructions.          Living with Osteoporosis: Regular Exercise  If you have osteoporosis, exercise is vital for your health. It can prevent bone fractures and spine changes. It will slow bone loss. Exercise will strengthen your body. It can also be fun. A variety of exercises is best. See below for exercises that can help you. But before you start, talk with your healthcare provider to be sure these exercises are right for you.   Resistance exercises. These build muscle strength and maintain bone mass. They also make you less prone to injury. Exercises include lifting small weights, doing push-ups and sit-ups, using elastic exercise bands, and  using weight machines.   Weight-bearing activities. These help your whole body. They also help you maintain bone mass. Activities include walking, dancing, and housework.   Non-weight-bearing exercises. These help prevent back strain and pain. They do this by building the trunk and leg muscles. Exercises that help with flexibility can prevent falls. Examples include swimming, water exercise, and stretching.   Staying safe  Here are tips to stay safe:   Always check with your healthcare provider before starting any new exercise program.  Use weights only as instructed.  Stop any exercise that causes pain.  [employer] last reviewed this educational content on 5/1/2018 2000-2021 [employer]. All rights reserved. This information is not intended as a substitute for professional medical care. Always follow your healthcare professional's instructions.          Preventing Osteoporosis: Avoiding Bone Loss  Certain factors can speed up bone loss or decrease bone growth. For example, alcohol, cigarettes, and certain medicines reduce bone mass. Some foods make it hard for your body to absorb calcium.  Things to avoid  Here are things to avoid to help prevent osteoporosis:  Alcohol. This is toxic to bones. It is a major cause of bone loss. Heavy drinking can cause osteoporosis even if you have no other risk factors.  Smoking. This reduces bone mass. Smoking may also interfere with estrogen levels and cause early menopause.  Inactivity. Not being active makes your bones lose strength and become thinner. Over time, thin bones may break. Women who aren't active are at a high risk for osteoporosis.  Certain medicines. Some medicines, such as cortisone, increase bone loss. They also decrease bone growth. Ask your healthcare provider about any side effects of your medicines, and how to prevent them.  Protein-rich or salty foods. Eaten in large amounts, these foods may deplete calcium.  Caffeine. This increases calcium  loss. People who drink a lot of coffee, tea, or soda lose more calcium than those who don't.  [employer] last reviewed this educational content on 2018-2021 [employer]. All rights reserved. This information is not intended as a substitute for professional medical care. Always follow your healthcare professional's instructions.          Preventing Osteoporosis: Meeting Your Calcium Needs  Your body needs calcium to build and repair bones. But it can't make calcium on its own. That's why it's important to eat calcium-rich foods. Some foods are naturally rich in calcium. Others have calcium added (fortified). It's best to get calcium from the foods you eat. But if you can't get enough, you may want to take calcium supplements. To meet your daily calcium needs, try the foods listed below.                          Dairy Fish & beans Other sources   Source   Calcium (mg) per serving   Source   Calcium (mg) per serving   Source   Calcium (mg) per serving   Low-fat yogurt, plain   415 mg/8 oz.   Sardines, [place], canned, with bones   351 mg/3 oz.   Oatmeal, instant, fortified   215 mg/1 cup   Nonfat milk   302 mg/1 cup   [place], sockeye, canned, with bones   239 mg/3 oz.   Tofu made with calcium sulfate   204 mg/3 oz.   Low-fat milk   297 mg/1 cup   Soybeans, fresh, boiled   131 mg/1/2 cup   Collards   179 mg/1/2 cup   Swiss cheese   272 mg/1 oz.   White beans, cooked   81 mg/1/2 cup   English muffin, whole wheat   175 mg/1 muffin   Cheddar cheese   205 mg/1 oz.   Navy beans, cooked   79 mg/1/2 cup   Kale   90 mg/1/2 cup   Ice cream strawberry   79 mg/1/2 cup           Orange, navel   56 mg/1 medium   Note: Calcium levels may vary depending on brand and size.  Daily calcium needs  14 to 18 years old: 1,300 mg  19 to 30 years old: 1,000 mg  31 to 50 years old: 1,000 mg  51 to 70 years old, women: 1,200 mg  51 to 70 years old, men: 1,000 mg  Pregnant or nursin to 18 years old: 1,300 mg, 19 to  50 years old: 1,000 mg  Older than 70 (women and men): 1,200 mg   [email or web address] last reviewed this educational content on 5/1/2018 2000-2021 [employer]. All rights reserved. This information is not intended as a substitute for professional medical care. Always follow your healthcare professional's instructions.

## 2024-04-08 NOTE — NURSING NOTE
Is the patient currently in the state of [place]? YES    Visit mode:VIDEO    If the visit is dropped, the patient can be reconnected by: VIDEO VISIT: Text to cell phone:   Telephone Information:   Mobile [phone]       Will anyone else be joining the visit? NO  (If patient encounters technical issues they should call [phone] :[id number])    How would you like to obtain your AVS? MyChart    Are changes needed to the allergy or medication list? No    Are refills needed on medications prescribed by this physician?     Reason for visit: RECHECK and Video Visit    Dayanara SPEARS

## 2024-04-08 NOTE — LETTER
"    4/8/2024         RE: Julia Key  [address] [place] [address]        Dear Colleague,    Thank you for referring your patient, Julia Key, to the [facility]. Please see a copy of my visit note below.    THIS IS A VIDEO VISIT:    Phone call visit/virtual visit encounter:    Name of patient: Julia Key    Date of encounter: 4/8/2024    Time of start of video visit: 8:33    Video started: 8:38    Video ended: 8:48    Provider location: working from home/ [facility]    Patient location: patients home.    Mode of transmission: PPT Re[employer] video/ [employer]    Verbal consent: obtained before starting visit. Pt is agreeable.      The patient has been notified of following:      \"This VIDEO visit will be conducted via a call between you and your physician/provider. We have found that certain health care needs can be provided without the need for a physical exam.  This service lets us provide the care you need with a short phone conversation.  If a prescription is necessary we can send it directly to your pharmacy.  If lab work is needed we can place an order for that and you can then stop by our lab to have the test done at a later time.     With new updates with corona virus patient might be billed as clinic visit.     If during the course of the call the physician/provider feels a telephone visit is not appropriate, you will not be charged for this service.\"      Past medical history, social history, family history, allergy and medications were reviewed and updated as appropriate.  Reviewed pertinent labs, notes, imaging studies personally.   Name: Julia Key  Date: 4/8/2024  Seen for f/u of osteoporosis.     HPI:  Julia Key is a 63 year old female who presents for the evaluation of osteoporosis.   has a past medical history of Arthritis (1994), Depression, prolonged, Hyperlipidemia, Idiopathic osteoporosis (03/08/2023), and Osteopenia.    She was diagnosed with " osteopenia few years back and then osteoporosis with DEXA 7/2022.    DEXA 2015: Osteopenia.    DEXA 7/2022:Osteoporosis. There has been probable significant decrease in bone density of the lumbar spine and of the hip(s).     Started PROLIA 2023. Tolerating well.      RISK FACTORS:  Post-menopausal, Parent history of osteoporosis, Parent history of a hip fracture, follow up osteopenia.  Started Fosamax 12/2022 but was not able to tolerate FOSAMAX 2/2 to GI s/e like severe abdominal pain.  Trial of BONIVA 1/2023-- but had face flushing, tingling lips and nausea.+ skin reaction.  Plan was for reclast but it was discontinued in the setting of above.  Started PROLIA 2023. Tolerating well.    PROLIA dates: (at [place])  11/2023 5/2023    GERD: yes- under control with diet only. Not on medication.    Dental health: normal. No major dental procedures planned.    Kidney function: within normal limits    Previous treatment for osteopenia/osteoporosis: previous Fosamax for one month in 2013 (she had dizziness on it and GI problems) and Boniva    Current treatment for Osteopenia/Osteoporosis: Calcium, Vitamin D    Smoke:No  Family History:Yes: mother with osteoporosis  Menstrual history/Birthing: s/p menopause. Took HRT for about 6 years. Last use was May 2020.  Fractures:No  Kidney stones: No  GI Surgery:No  Duration of therapy:  As noted above  Exercise:Yes: 4-5 times a week- 30-60 min each time.  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     Hyperlipidemia     One high reading late Fall 2016     Idiopathic osteoporosis 03/08/2023     Osteopenia      Past Surgical History:   Procedure Laterality Date     COLONOSCOPY  08/2019     CV CORONARY ANGIOGRAM N/A 08/11/2022     Procedure: Coronary Angiogram;  Surgeon: Diana Smith MD;  Location:  [facility]     ORTHOPEDIC SURGERY  05/1997     ZZC LT SHOULDER G/E 2 VIEWS  01/01/1999 1997, 1999 - decompression, clavicle resection     Family Hx:  Family History   Problem Relation Age of Onset     Osteoporosis Mother      Dementia Mother      Hyperlipidemia Mother      Hypertension Father      Cerebrovascular Disease Father      Coronary Artery Disease Father 30     Diabetes Father      Hyperlipidemia Father      Thyroid Disease Father      Coronary Artery Disease Brother      No Known Problems Maternal Grandmother      No Known Problems Maternal Grandfather      Myocardial Infarction Paternal Grandmother      Obesity Paternal Grandmother        Social Hx:  Social History     Socioeconomic History     Marital status:      Spouse name: Not on file     Number of children: Not on file     Years of education: Not on file     Highest education level: Not on file   Occupational History     Occupation: Retired .   Tobacco Use     Smoking status: Never     Smokeless tobacco: Never   Vaping Use     Vaping Use: Never used   Substance and Sexual Activity     Alcohol use: Yes     Comment: 2-3 drinks/month, socially     Drug use: No     Sexual activity: Yes     Partners: Male     Birth control/protection: None   Other Topics Concern     Parent/sibling w/ CABG, MI or angioplasty before 65F 55M? 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   since 1989. One daughter, one dog.     Social Determinants of Health     Financial Resource Strain: Low Risk  (7/21/2023)    Overall Financial Resource Strain (CARDIA)      Difficulty of Paying Living Expenses: Not hard at all   Food Insecurity: No Food Insecurity (7/21/2023)    Hunger Vital Sign      Worried About Running Out of Food in the Last Year: Never true      Ran Out of Food in the Last Year: Never  true   Transportation Needs: No Transportation Needs (7/21/2023)    PRAPARE - Transportation      Lack of Transportation (Medical): No      Lack of Transportation (Non-Medical): No   Physical Activity: Sufficiently Active (7/21/2023)    Exercise Vital Sign      Days of Exercise per Week: 6 days      Minutes of Exercise per Session: 40 min   Stress: No Stress Concern Present (7/21/2023)    [employer] [place] [employer] - Occupational Stress Questionnaire      Feeling of Stress : Not at all   Social Connections: Moderately Integrated (7/21/2023)    Social Connection and Isolation Panel [NHANES]      Frequency of Communication with Friends and Family: More than three times a week      Frequency of Social Gatherings with Friends and Family: Once a week      Attends Confucianism Services: 1 to 4 times per year      Active Member of Clubs or Organizations: No      Attends Club or Organization Meetings: Not on file      Marital Status:    Interpersonal Safety: Not on file   Housing Stability: Low Risk  (7/21/2023)    Housing Stability Vital Sign      Unable to Pay for Housing in the Last Year: No      Number of Places Lived in the Last Year: 1      Unstable Housing in the Last Year: No          MEDICATIONS:  has a current medication list which includes the following prescription(s): calcium-vitamin d, cetirizine, and triamcinolone.    ROS     ROS: 10 point ROS neg other than the symptoms noted above in the HPI.    Physical Exam   VS: Wt 65.8 kg (145 lb)   BMI 24.13 kg/m    GENERAL: healthy, alert and no distress  EYES: Eyes grossly normal to inspection, conjunctivae and sclerae normal  ENT: no nose swelling, nasal discharge.  Thyroid: no apparent thyroid nodules  RESP: no audible wheeze, cough, or visible cyanosis.  No visible retractions or increased work of breathing.  Able to speak fully in complete sentences.  ABDO: not evaluated.  EXTREMITIES: no hand tremors.  NEURO: Cranial nerves grossly intact,  mentation intact and speech normal  SKIN: No apparent skin lesions, rash or edema seen   PSYCH: mentation appears normal, affect normal/bright, judgement and insight intact, normal speech and appearance well-groomed      LABS:  Calcium:  ENDO CALCIUM LABS-[facility] Latest Ref Rng & Units 8/11/2022   CALCIUM 8.5 - 10.1 mg/dL 9.4     Creatinine:  Creatinine   Date Value Ref Range Status   08/23/2023 0.89 0.51 - 0.95 mg/dL Final   03/29/2021 0.76 0.52 - 1.04 mg/dL Final       TFTs:  Lab Results   Component Value Date    TSH 3.01 12/16/2016       PTH:  Vitamin D:  Vitamin D Deficiency Screening Results:  Lab Results   Component Value Date    VITDT 45 12/21/2022       BoneTurnOverMarkers:    DEXA 7/2022:  FINDINGS:               Lumbar Spine (L1-L4)      T-score:  -2.5, degenerative changes present               Left Femoral Neck            T-score:  -1.7               Right Femoral Neck          T-score:  -1.3                  Lumbar (L1-L4) BMD: 0.889  Previous: 1.021                          Total Hip Mean BMD: 0.832  Previous: 0.880     Comparison is made to another DXA performed on a different [employer] machine on 07/27/2015.       IMPRESSION  Osteoporosis., Degenerative changes of the lumbar spine which may falsely elevate results.     There has been probable significant decrease in bone density of the lumbar spine. There has been probable significant decrease in bone density of the hip(s).        All pertinent notes, labs, and images personally reviewed by me.     A/P  Ms.Mary ERIKA Key is a 63 year old here for the evaluation of:    Age-related osteoporosis without current pathological fracture  Comment: Risk factors for low bone density include personal history of fracture or family history, , female, Estrogen deficiency.   DEXA 7/2022:Osteoporosis. There has been probable significant decrease in bone density of the lumbar spine and of the hip(s).   Normal calcium, creatinine and vit D.  Not able to tolerate  FOSAMAX and BONIVA as noted above.  On Prolia since 5/2023 and tolerating ok.  Plan:   Discussed diagnosis, pathophysiology, management and treatment options of condition with pt.  Plan to continue PROLIA.  Next PROLIA 5/2024, 11/2024 ( based on DEXA)  DEXA 7/2024 onwards. ( At [place])  Follow up after that.    Plan: denosumab (PROLIA) injection 60 mg, Calcium,         Creatinine, DX Bone Density        Discussed indications, risks and benefits of all medications prescribed, and answered questions to patient's satisfaction.    The pt was advised to  Maintain an adequate calcium and vitamin D intake and to supplement vitamin D if needed to maintain serum levels of 25 hydroxy D (25 OH D) between 30-60 ng/ml.  Limit alcohol intake to no more than 2 servings per day.  Limit caffeine intake.  Maintain an active lifestyle including weight-bearing exercises for at least 30 mins daily.  Take measures to reduce the risk of falling.    Possible major side effects of Denosumab (PROLIA) include risk for atypical femur fractures, hypersensitivity, low calcium levels, osteonecrosis of jaw (which can manifest as jaw pain, osteomyelitis, osteitis, bone erosion, tooth/periodontal infection, toothache, gingival ulceration/erosion). Other s/e include but not limited to Hypertension, Headache and peripheral edema.   Always let your dentist lnow about the medication if plan for major dental procedure.    Indication: Prolia  (denosumab) is a prescription medicine used to treat osteoporosis in patients who:   Are at high risk for fracture, meaning patients who have had a fracture related to osteoporosis, or who have multiple risk factors for fracture   Cannot use another osteoporosis medicine or other osteoporosis medicines did not work well   The timeline for early/late injections would be 4 weeks early and any time after the 6 month johnna. If a patient receives their injection late, then the subsequent injection would be 6 months from the  date that they actually received the injection      Discussed indications, risks and benefits of all medications prescribed, and answered questions to patient's satisfaction.  The longitudinal plan of care for the diagnosis(es)/condition(s) as documented were addressed during this visit. Due to the added complexity in care, I will continue to support Julia in the subsequent management and with ongoing continuity of care.    All questions were answered.  The patient indicates understanding of the above issues and agrees with the plan set forth.      Follow-up:  As noted in AVS    Yessica Rose MD  Endocrinology  [facility]  CC: Shannon Arciniega      Again, thank you for allowing me to participate in the care of your patient.        Sincerely,        Yessica Rose MD

## 2024-04-10 ENCOUNTER — MYC MEDICAL ADVICE (OUTPATIENT)
Dept: PEDIATRICS | Facility: CLINIC | Age: 64
End: 2024-04-10
Payer: MEDICARE

## 2024-04-30 ENCOUNTER — LAB (OUTPATIENT)
Dept: LAB | Facility: CLINIC | Age: 64
End: 2024-04-30
Payer: MEDICARE

## 2024-04-30 DIAGNOSIS — M81.0 AGE RELATED OSTEOPOROSIS, UNSPECIFIED PATHOLOGICAL FRACTURE PRESENCE: ICD-10-CM

## 2024-04-30 DIAGNOSIS — Z92.29 HX DRUG THERAPY: ICD-10-CM

## 2024-04-30 DIAGNOSIS — M81.8 IDIOPATHIC OSTEOPOROSIS: ICD-10-CM

## 2024-04-30 LAB — CALCIUM SERPL-MCNC: 9.6 MG/DL (ref 8.8–10.2)

## 2024-04-30 PROCEDURE — 36415 COLL VENOUS BLD VENIPUNCTURE: CPT

## 2024-04-30 PROCEDURE — 82310 ASSAY OF CALCIUM: CPT

## 2024-05-09 ENCOUNTER — ALLIED HEALTH/NURSE VISIT (OUTPATIENT)
Dept: PEDIATRICS | Facility: CLINIC | Age: 64
End: 2024-05-09
Payer: MEDICARE

## 2024-05-09 DIAGNOSIS — M81.0 AGE-RELATED OSTEOPOROSIS WITHOUT CURRENT PATHOLOGICAL FRACTURE: Primary | ICD-10-CM

## 2024-05-09 PROCEDURE — 96372 THER/PROPH/DIAG INJ SC/IM: CPT | Performed by: INTERNAL MEDICINE

## 2024-05-09 PROCEDURE — 99207 PR NO CHARGE NURSE ONLY: CPT

## 2024-05-09 NOTE — PROGRESS NOTES
Clinic Administered Medication Documentation      Prolia Documentation    Indication: Prolia  (denosumab) is a prescription medicine used to treat osteoporosis in patients who:   Are at high risk for fracture, meaning patients who have had a fracture related to osteoporosis, or who have multiple risk factors for fracture.  Cannot use another osteoporosis medicine or other osteoporosis medicines did not work well.  The timeline for early/late injections would be 4 weeks early and any time after the 6 month johnna. If a patient receives their injection late, then the subsequent injection would be 6 months from the date that they actually received the injection.    When was the last injection?  23  Was the last injection at least 6 months ago? Yes  Has the prior authorization been completed?  Yes  Is there an active order (written within the past 365 days, with administrations remaining, not ) in the chart?  Yes  Patient denies any dental work involving the bone (e.g. tooth extraction or dental implants) in the past 4 weeks?  Yes  Patient denies plans for any dental work involving the bone (e.g. tooth extraction or dental implants) in the next 4 weeks? Yes    The following steps were completed to comply with the REMS program for Prolia:  Reviewed information in the Medication Guide and Patient Counseling Chart, including the serious risks of Prolia  and the symptoms of each risk.  Advised patient to seek prompt medical attention if they have signs or symptoms of any of the serious risks.  Provided each patient a copy of the Medication Guide and Patient Brochure.    Prior to injection, verified patient identity using patient's name and date of birth. Medication was administered. Please see MAR and medication order for additional information. Patient instructed to remain in clinic for 15 minutes and report any adverse reaction to staff immediately.    Vial/Syringe: Single dose vial. Was entire vial of  medication used? Yes  Was this medication supplied by the patient? No  Verified that the patient has refills remaining in their prescription.    Pt tolerated the inj well, administered in R arm.  Scheduled next appointment.     Vel Adkins RN  [facility]

## 2024-07-24 SDOH — HEALTH STABILITY: PHYSICAL HEALTH: ON AVERAGE, HOW MANY MINUTES DO YOU ENGAGE IN EXERCISE AT THIS LEVEL?: 40 MIN

## 2024-07-24 SDOH — HEALTH STABILITY: PHYSICAL HEALTH: ON AVERAGE, HOW MANY DAYS PER WEEK DO YOU ENGAGE IN MODERATE TO STRENUOUS EXERCISE (LIKE A BRISK WALK)?: 5 DAYS

## 2024-07-24 ASSESSMENT — SOCIAL DETERMINANTS OF HEALTH (SDOH): HOW OFTEN DO YOU GET TOGETHER WITH FRIENDS OR RELATIVES?: MORE THAN THREE TIMES A WEEK

## 2024-07-29 ENCOUNTER — OFFICE VISIT (OUTPATIENT)
Dept: PEDIATRICS | Facility: CLINIC | Age: 64
End: 2024-07-29
Payer: MEDICARE

## 2024-07-29 VITALS
RESPIRATION RATE: 16 BRPM | SYSTOLIC BLOOD PRESSURE: 130 MMHG | OXYGEN SATURATION: 99 % | HEART RATE: 75 BPM | HEIGHT: 64 IN | BODY MASS INDEX: 24.91 KG/M2 | TEMPERATURE: 97 F | WEIGHT: 145.9 LBS | DIASTOLIC BLOOD PRESSURE: 70 MMHG
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
## 2024-07-29 DIAGNOSIS — Z00.00 ENCOUNTER FOR MEDICARE ANNUAL WELLNESS EXAM: Primary | ICD-10-CM

## 2024-07-29 DIAGNOSIS — E78.49 FAMILIAL HYPERLIPIDEMIA: ICD-10-CM

## 2024-07-29 DIAGNOSIS — M81.0 AGE RELATED OSTEOPOROSIS, UNSPECIFIED PATHOLOGICAL FRACTURE PRESENCE: ICD-10-CM

## 2024-07-29 DIAGNOSIS — F41.8 DEPRESSION WITH ANXIETY: ICD-10-CM

## 2024-07-29 DIAGNOSIS — Z82.49 FAMILY HISTORY OF ISCHEMIC HEART DISEASE: ICD-10-CM

## 2024-07-29 DIAGNOSIS — Z98.890 STATUS POST CORONARY ANGIOGRAM: ICD-10-CM

## 2024-07-29 DIAGNOSIS — R94.39 ABNORMAL CARDIOVASCULAR STRESS TEST: ICD-10-CM

## 2024-07-29 PROBLEM — M81.8 IDIOPATHIC OSTEOPOROSIS: Status: RESOLVED | Noted: 2023-03-08 | Resolved: 2024-07-29

## 2024-07-29 PROCEDURE — 99397 PER PM REEVAL EST PAT 65+ YR: CPT | Performed by: NURSE PRACTITIONER

## 2024-07-29 RX ORDER — TRIAMCINOLONE ACETONIDE 1 MG/G
CREAM TOPICAL 2 TIMES DAILY
Qty: 45 G | Refills: 0 | Status: SHIPPED | OUTPATIENT
Start: 2024-07-29

## 2024-07-29 ASSESSMENT — PAIN SCALES - GENERAL: PAINLEVEL: NO PAIN (0)

## 2024-07-29 NOTE — PROGRESS NOTES
Preventive Care Visit  [facility] DARLENE Keller CNP, Family Medicine  Jul 29, 2024      Assessment & Plan     Encounter for Medicare annual wellness exam  Age appropriate screening and preventative care have been addressed today. Vaccinations have been reviewed and discussed. Patient has been advised to follow a balanced diet with reduction in cholesterol, and reasonable portion sizes. They have been advised to undertake routine aerobic activity and they were counseled on healthy weight maintenance. Recommend annual vision exams as well as biannual dental exams. They will follow up for annual physical again in one year.   - Last colonoscopy took place at colorectal [facility] age 58, was told to repeat in 10 years.  - Pap - utd, don't feel further screening is warranted  - Mammo - due  - DEXA - scheduled    Familial hyperlipidemia - LDL goal <100  Abnormal cardiovascular stress test  Status post coronary angiogram  Family history of ischemic heart disease  Following with cardiology. Last OV 2/2024 at which time rosuvastatin and losartan were stopped after she undertook commendable lifestyle modification, repeat labs in 6 months (August). She is getting 30 min daily of cardio and 15-20 of strength. Silver sneakers online, yoga class for seniors.     Age related osteoporosis, unspecified pathological fracture presence  On prolia injections, managed by endo. Active with walking, yoga, pilates. Has upcoming DEXA scheduled.    Depression with anxiety  Stable, no concerns.     Bug bite, sequela  Refilled.  - triamcinolone (KENALOG) 0.1 % external cream; Apply topically 2 times daily      Counseling  Appropriate preventive services were addressed with this patient via screening, questionnaire, or discussion as appropriate for fall prevention, nutrition, physical activity, Tobacco-use cessation, weight loss and cognition.  Checklist reviewing preventive services available has been given to the  patient.  Reviewed patient's diet, addressing concerns and/or questions.       Subjective   Julia is a 63 year old, presenting for the following:  Physical        7/29/2024    11:08 AM   Additional Questions   Roomed by Christine CHURCH   Accompanied by Self         7/29/2024    11:08 AM   Patient Reported Additional Medications   Patient reports taking the following new medications No        Health Care Directive  Patient does not have a Health Care Directive or Living Will: Patient states has Advance Directive and will bring in a copy to clinic.    HPI      Osteoporosis - on prolia injections, managed by endo. Active with walking, yoga, pilates.     Hyperlipidemia - following with cardiology. Last OV 2/2024 at which time rosuvastatin and losartan were stopped after she undertook commendable lifestyle modification, repeat labs in 6 months (August). She is getting 30 min daily of cardio and 15-20 of strength. [employer], yoga class for seniors.     BP Readings from Last 3 Encounters:   07/29/24 130/70   02/26/24 124/71   08/28/23 122/79      [facility] 58, was told to repeat in 10 years.     Pap - utd  Mammo - due  DEXA - scheduled        7/24/2024   General Health   How would you rate your overall physical health? Excellent   Feel stress (tense, anxious, or unable to sleep) Not at all            7/24/2024   Nutrition   Diet: Gluten-free/reduced            7/24/2024   Exercise   Days per week of moderate/strenous exercise 5 days   Average minutes spent exercising at this level 40 min            7/24/2024   Social Factors   Frequency of gathering with friends or relatives More than three times a week   Worry food won't last until get money to buy more No   Food not last or not have enough money for food? No   Do you have housing? (Housing is defined as stable permanent housing and does not include staying ouside in a car, in a tent, in an abandoned building, in an  overnight shelter, or couch-surfing.) Yes   Are you worried about losing your housing? No   Lack of transportation? No   Unable to get utilities (heat,electricity)? No            7/24/2024   Fall Risk   Fallen 2 or more times in the past year? No   Trouble with walking or balance? No             7/24/2024   Activities of Daily Living- Home Safety   Needs help with the following daily activites None of the above   Safety concerns in the home None of the above            7/24/2024   Dental   Dentist two times every year? Yes            7/24/2024   Hearing Screening   Hearing concerns? None of the above            7/24/2024   Driving Risk Screening   Patient/family members have concerns about driving No            7/24/2024   General Alertness/Fatigue Screening   Have you been more tired than usual lately? No            7/24/2024   Urinary Incontinence Screening   Bothered by leaking urine in past 6 months No            7/24/2024   TB Screening   Were you born outside of the [place]? No            Today's PHQ-2 Score:       7/28/2024    12:27 PM   PHQ-2 ( 1999 Pfizer)   Q1: Little interest or pleasure in doing things 0   Q2: Feeling down, depressed or hopeless 0   PHQ-2 Score 0   Q1: Little interest or pleasure in doing things Not at all   Q2: Feeling down, depressed or hopeless Not at all   PHQ-2 Score 0           7/24/2024   Substance Use   Alcohol more than 3/day or more than 7/wk No   Do you have a current opioid prescription? No   How severe/bad is pain from 1 to 10? 1/10   Do you use any other substances recreationally? No        Social History     Tobacco Use    Smoking status: Never    Smokeless tobacco: Never   Vaping Use    Vaping status: Never Used   Substance Use Topics    Alcohol use: Yes     Comment: 2-3 drinks/month, socially    Drug use: No           8/8/2023   LAST FHS-7 RESULTS   1st degree relative breast or ovarian cancer No   Any relative bilateral breast cancer No   Any male have breast cancer No   Any  ONE woman have BOTH breast AND ovarian cancer No   Any woman with breast cancer before 50yrs No   2 or more relatives with breast AND/OR ovarian cancer No   2 or more relatives with breast AND/OR bowel cancer No           Mammogram Screening - Mammogram every 1-2 years updated in Health Maintenance based on mutual decision making      History of abnormal Pap smear: No - age 65 or older with adequate negative prior screening test results (3 consecutive negative cytology results, 2 consecutive negative cotesting results, or 2 consecutive negative HrHPV test results within 10 years, with the most recent test occurring within the recommended screening interval for the test used)        Latest Ref Rng & Units 7/28/2023     8:21 AM 7/29/2016    12:00 AM 7/27/2015    12:00 AM   PAP / HPV   PAP  Negative for Intraepithelial Lesion or Malignancy (NILM)      PAP (Historical)   NIL  NIL    HPV 16 DNA Negative Negative      HPV 18 DNA Negative Negative      Other HR HPV Negative Negative        ASCVD Risk   The 10-year ASCVD risk score (Rene GUERRA, et al., 2019) is: 5.1%    Values used to calculate the score:      Age: 63 years      Sex: Female      Is Non- : No      Diabetic: No      Tobacco smoker: No      Systolic Blood Pressure: 130 mmHg      Is BP treated: No      HDL Cholesterol: 59 mg/dL      Total Cholesterol: 171 mg/dL          Reviewed and updated as needed this visit by Provider                    Patient Active Problem List   Diagnosis    Osteopenia    Familial hyperlipidemia - LDL goal <100    Depression with anxiety    Abnormal cardiovascular stress test    Hx drug therapy    Age related osteoporosis, unspecified pathological fracture presence    Idiopathic osteoporosis     Past Surgical History:   Procedure Laterality Date    COLONOSCOPY  08/2019    CV CORONARY ANGIOGRAM N/A 08/11/2022    Procedure: Coronary Angiogram;  Surgeon: Diana Smith MD;  Location: [facility] CARDIAC  CATH LAB    ORTHOPEDIC SURGERY  05/1997    [facility] LT SHOULDER G/E 2 VIEWS  01/01/1999 1997, 1999 - decompression, clavicle resection       Social History     Tobacco Use    Smoking status: Never    Smokeless tobacco: Never   Substance Use Topics    Alcohol use: Yes     Comment: 2-3 drinks/month, socially     Family History   Problem Relation Age of Onset    Osteoporosis Mother     Dementia Mother     Hyperlipidemia Mother     Hypertension Father     Cerebrovascular Disease Father     Coronary Artery Disease Father 30    Diabetes Father     Hyperlipidemia Father     Thyroid Disease Father     Coronary Artery Disease Brother     No Known Problems Maternal Grandmother     No Known Problems Maternal Grandfather     Myocardial Infarction Paternal Grandmother     Obesity Paternal Grandmother          Current providers sharing in care for this patient include:  Patient Care Team:  Shannon Arciniega APRN CNP as PCP - General (Family Medicine)  Lloyd Olivarez MD as MD (Internal Medicine)  Shannon Arciniega APRN CNP as Assigned PCP  Yessica Rose MD as Hospitalist (Endocrinology, Diabetes, and Metabolism)  Yessica Rose MD as Assigned Endocrinology Provider  Sienna Alston MD as Assigned Heart and Vascular Provider    The following health maintenance items are reviewed in Epic and correct as of today:  Health Maintenance   Topic Date Due    RSV VACCINE (Pregnancy & 60+) (1 - 1-dose 60+ series) Never done    ANNUAL REVIEW OF HM ORDERS  07/27/2023    Pneumococcal Vaccine: 65+ Years (1 of 1 - PCV) Never done    COVID-19 Vaccine (7 - 2023-24 season) 02/21/2024    MEDICARE ANNUAL WELLNESS VISIT  07/28/2024    INFLUENZA VACCINE (1) 09/01/2024    LIPID  02/21/2025    FALL RISK ASSESSMENT  07/29/2025    MAMMO SCREENING  08/08/2025    GLUCOSE  08/23/2026    ADVANCE CARE PLANNING  07/27/2027    DTAP/TDAP/TD IMMUNIZATION (3 - Td or Tdap) 04/13/2031    COLORECTAL CANCER SCREENING  07/16/2033    DEXA  07/28/2037     "HEPATITIS C SCREENING  Completed    HIV SCREENING  Completed    PHQ-2 (once per calendar year)  Completed    ZOSTER IMMUNIZATION  Completed    IPV IMMUNIZATION  Aged Out    HPV IMMUNIZATION  Aged Out    MENINGITIS IMMUNIZATION  Aged Out    RSV MONOCLONAL ANTIBODY  Aged Out    PAP  Discontinued          Objective    Exam  /70 (BP Location: Right arm, Patient Position: Sitting, Cuff Size: Adult Regular)   Pulse 75   Temp 97  F (36.1  C) (Tympanic)   Resp 16   Ht 1.637 m (5' 4.45\")   Wt 66.2 kg (145 lb 14.4 oz)   SpO2 99%   BMI 24.70 kg/m     Estimated body mass index is 24.7 kg/m  as calculated from the following:    Height as of this encounter: 1.637 m (5' 4.45\").    Weight as of this encounter: 66.2 kg (145 lb 14.4 oz).    Physical Exam  Constitutional: appears to be in no acute distress, comfortable, pleasant.   Eyes: anicteric, conjunctiva clear without drainage or erythema. MARQUEZ.   Ears, Nose and Throat: tympanic membranes gray with LR,  nose without nasal discharge. OP: no erythema to posterior pharynx, negative post nasal drainage, tonsils +1 no erythema or exudate.  Neck: supple, thyroid palpable,not enlarged, no nodules   Breast: Exam deferred (deferred after discussion of exam options with patient, no symptoms or concerns).   Cardiovascular: regular rate and rhythm, normal S1 and S2, no murmurs, rubs or gallops, peripheral pulses full and symmetric; negative peripheral edema   Respiratory: Air entry throughout. Breathing pattern unlabored without the use of accessory muscles. Clear to auscultation A and P, no wheezes or crackles, normal breath sounds.    Gastrointestinal: rounded, soft. Positive bowel sounds x4, nontender, no masses.   Genitourinary: Exam deferred (deferred after discussion of exam options with patient, no symptoms or concerns, pap is up to date).   Musculoskeletal: full range of motion, no edema.   Skin: pink, turgor smooth and elastic. Negative for lesions or " dryness.  Neurological: normal gait, no tremor.   Psychological: appropriate mood and affect.   Lymphatic: no cervical, axillary, supraclavicular, or infraclavicular lymphadenopathy.          7/29/2024   Mini Cog   Clock Draw Score 2 Normal   3 Item Recall 3 objects recalled   Mini Cog Total Score 5            Vision Screen  Reason Vision Screen Not Completed: Patient had exam in last 12 months      Signed Electronically by: DARLENE Acevedo CNP

## 2024-07-30 ENCOUNTER — ANCILLARY PROCEDURE (OUTPATIENT)
Dept: BONE DENSITY | Facility: CLINIC | Age: 64
End: 2024-07-30
Attending: INTERNAL MEDICINE
Payer: MEDICARE

## 2024-07-30 DIAGNOSIS — Z92.29 HX DRUG THERAPY: ICD-10-CM

## 2024-07-30 DIAGNOSIS — M81.8 IDIOPATHIC OSTEOPOROSIS: ICD-10-CM

## 2024-07-30 DIAGNOSIS — M81.0 AGE RELATED OSTEOPOROSIS, UNSPECIFIED PATHOLOGICAL FRACTURE PRESENCE: ICD-10-CM

## 2024-07-30 PROCEDURE — 77080 DXA BONE DENSITY AXIAL: CPT | Mod: TC

## 2024-08-14 ENCOUNTER — ANCILLARY PROCEDURE (OUTPATIENT)
Dept: MAMMOGRAPHY | Facility: CLINIC | Age: 64
End: 2024-08-14
Attending: NURSE PRACTITIONER
Payer: MEDICARE

## 2024-08-14 DIAGNOSIS — Z12.31 VISIT FOR SCREENING MAMMOGRAM: ICD-10-CM

## 2024-08-14 PROCEDURE — 77067 SCR MAMMO BI INCL CAD: CPT | Mod: TC | Performed by: RADIOLOGY

## 2024-08-14 PROCEDURE — 77063 BREAST TOMOSYNTHESIS BI: CPT | Mod: TC | Performed by: RADIOLOGY

## 2024-08-21 ENCOUNTER — LAB (OUTPATIENT)
Dept: LAB | Facility: CLINIC | Age: 64
End: 2024-08-21
Payer: MEDICARE

## 2024-08-21 DIAGNOSIS — E78.00 PURE HYPERCHOLESTEROLEMIA: ICD-10-CM

## 2024-08-21 LAB
CHOLEST SERPL-MCNC: 246 MG/DL
FASTING STATUS PATIENT QL REPORTED: YES
HDLC SERPL-MCNC: 54 MG/DL
LDLC SERPL CALC-MCNC: 168 MG/DL
NONHDLC SERPL-MCNC: 192 MG/DL
TRIGL SERPL-MCNC: 121 MG/DL

## 2024-08-21 PROCEDURE — 80061 LIPID PANEL: CPT

## 2024-08-21 PROCEDURE — 36415 COLL VENOUS BLD VENIPUNCTURE: CPT

## 2024-08-22 NOTE — PROGRESS NOTES
SERVICE DATE: August 23, 2024    VIDEO VISIT.    DIAGNOSES/ASSESSMENT:  Likely familial hypercholesterolemia with LDL of 160-180. Unfortunately not surprisingly her LDL is back up despite an excellent lifestyle. I think she has an inherited hypercholesterolemia and will need a statin to normalize her LDL.  Restart statin therapy.  Continue excellent lifestyle.  Muscle aches with rosuvastatin.  Angiographically normal coronary arteries in 2022.  Never tobacco user.    PLAN:  Restart statin.  Prescribed atorvastatin 40 mg daily.  Send me a [employer] message if you get muscle aches.  Fasting lipids, liver enzymes, apolipoprotein B, lipoprotein a, cardiac CRP and video visit with me in 6 months.      Sienna Alston MD  Cardiology      REASON FOR VISIT:  Follow-up of hypercholesterolemia and hypertension after shared decision making to stop medications following significant lifestyle modification.    HISTORY OF PRESENT ILLNESS:  It was my pleasure to follow-up with Julia was a 63-year-old female, retired , younger appearing. She is known to have isolated high LDL hypercholesterolemia with LDLs of 175 without statin therapy, avid exerciser, angiographically normal coronary arteries in 2022, osteoporosis, never tobacco use, normal BMI, family history of premature coronary artery disease.    On the rosuvastatin 20 mg daily her LDL had come down to 90 but she had myalgia as a side effect.  She had understandably wanted to see how she did without rosuvastatin and is here to discuss follow-up labs.    She continues to have excellent nutrition and exercises daily doing water aerobics, walking and strength training.  After coming off her losartan, her BP has normalized to the 120s over 70s.  She is completely asymptomatic. Her LDL is back up at 168 (from 90 on statin therapy), HDL is 54, triglycerides 121, total cholesterol 246, normal renal panel, normal TSH.      Established patient.   30  minutes spent by me on the date of the encounter doing chart review, history and exam, documentation and further activities per the note    The longitudinal plan of care for the condition(s) below were addressed during this visit. Due to the added complexity in care, I will continue to support Julia in the subsequent management of this condition(s) and with the ongoing continuity of care of this condition(s).  Hyperlipidemia.    This note was completed in part using dictation via the Dragon voice recognition software. Some word and grammatical errors may occur and must be interpreted in the appropriate clinical context. If there are any questions pertaining to this issue, please contact me for further clarification.     Orders Placed This Encounter   Procedures    Apolipoprotein B    Lipoprotein (a)    Comprehensive metabolic panel    Lipid Profile    CRP cardiac risk    Follow-Up with Cardiology           CURRENT MEDICATIONS:  Current Outpatient Medications   Medication Sig Dispense Refill    atorvastatin (LIPITOR) 20 MG tablet Take 1 tablet (20 mg) by mouth daily. 100 tablet 4    CALCIUM-VITAMIN D PO Take 2 tablets by mouth 2 times daily. 1500mg calcium  2000units Vitamin D3      cetirizine (ZYRTEC) 10 MG tablet Take 10 mg by mouth daily      triamcinolone (KENALOG) 0.1 % external cream Apply topically 2 times daily 45 g 0         ALLERGIES:  Allergies   Allergen Reactions    Boniva [Ibandronic Acid] Other (See Comments) and Hives     Tingling of lips      Fosamax [Alendronate] Swelling     Lips and throat swelling    Iodine Hives    Latex        Julia is a 63 year old who is being evaluated via a billable video visit.    How would you like to obtain your AVS? MyChart  If the video visit is dropped, the invitation should be resent by: Text to cell phone: [phone]  Will anyone else be joining your video visit? No    Video-Visit Details    Type of service:  Video Visit   Originating Location (pt. Location):  "Home  Distant Location (provider location):  On-site  Platform used for Video Visit: Cellay    Vitals - Patient Reported  Systolic (Patient Reported): 118  Diastolic (Patient Reported): 68  Weight (Patient Reported): 65.8 kg (145 lb)  Height (Patient Reported): 163.7 cm (5' 4.45\")  BMI (Based on Pt Reported Ht/Wt): 24.54      Patient will be using Amwell/mychart for her video visit today.     Reina Wood CMA      "

## 2024-08-23 ENCOUNTER — VIRTUAL VISIT (OUTPATIENT)
Dept: CARDIOLOGY | Facility: CLINIC | Age: 64
End: 2024-08-23
Attending: INTERNAL MEDICINE
Payer: MEDICARE

## 2024-08-23 DIAGNOSIS — E78.00 PURE HYPERCHOLESTEROLEMIA: Primary | ICD-10-CM

## 2024-08-23 PROBLEM — R94.39 ABNORMAL CARDIOVASCULAR STRESS TEST: Status: RESOLVED | Noted: 2022-08-02 | Resolved: 2024-08-23

## 2024-08-23 PROCEDURE — G2211 COMPLEX E/M VISIT ADD ON: HCPCS | Mod: 95 | Performed by: INTERNAL MEDICINE

## 2024-08-23 PROCEDURE — 99214 OFFICE O/P EST MOD 30 MIN: CPT | Mod: 95 | Performed by: INTERNAL MEDICINE

## 2024-08-23 RX ORDER — ATORVASTATIN CALCIUM 20 MG/1
20 TABLET, FILM COATED ORAL DAILY
Qty: 100 TABLET | Refills: 4 | Status: SHIPPED | OUTPATIENT
Start: 2024-08-23

## 2024-08-23 NOTE — PROGRESS NOTES
"Julia is a 63 year old who is being evaluated via a billable video visit.    How would you like to obtain your AVS? MyChart  If the video visit is dropped, the invitation should be resent by: Text to cell phone: [phone]  Will anyone else be joining your video visit? No  {If patient encounters technical issues they should call [phone] :[id number]}  Video-Visit Details    Type of service:  Video Visit   Originating Location (pt. Location): Home  Distant Location (provider location):  On-site  Platform used for Video Visit: [employer] - Patient Reported  Systolic (Patient Reported): 118  Diastolic (Patient Reported): 68  Weight (Patient Reported): 65.8 kg (145 lb)  Height (Patient Reported): 163.7 cm (5' 4.45\")  BMI (Based on Pt Reported Ht/Wt): 24.54      Patient will be using [employer] for her video visit today.       Reina Wood CMA  "

## 2024-08-23 NOTE — LETTER
8/23/2024    Shannon Suze, APRN CNP  [address] [place] [address]    RE: Julia Key       Dear Colleague,     I had the pleasure of seeing Julia Key in the [facility].      SERVICE DATE: August 23, 2024    VIDEO VISIT.    DIAGNOSES/ASSESSMENT:  Likely familial hypercholesterolemia with LDL of 160-180. Unfortunately not surprisingly her LDL is back up despite an excellent lifestyle. I think she has an inherited hypercholesterolemia and will need a statin to normalize her LDL.  Restart statin therapy.  Continue excellent lifestyle.  Muscle aches with rosuvastatin.  Angiographically normal coronary arteries in 2022.  Never tobacco user.    PLAN:  Restart statin.  Prescribed atorvastatin 40 mg daily.  Send me a [facility] message if you get muscle aches.  Fasting lipids, liver enzymes, apolipoprotein B, lipoprotein a, cardiac CRP and video visit with me in 6 months.      Sienna Alston MD  Cardiology      REASON FOR VISIT:  Follow-up of hypercholesterolemia and hypertension after shared decision making to stop medications following significant lifestyle modification.    HISTORY OF PRESENT ILLNESS:  It was my pleasure to follow-up with Julia was a 63-year-old female, retired , younger appearing. She is known to have isolated high LDL hypercholesterolemia with LDLs of 175 without statin therapy, avid exerciser, angiographically normal coronary arteries in 2022, osteoporosis, never tobacco use, normal BMI, family history of premature coronary artery disease.    On the rosuvastatin 20 mg daily her LDL had come down to 90 but she had myalgia as a side effect.  She had understandably wanted to see how she did without rosuvastatin and is here to discuss follow-up labs.    She continues to have excellent nutrition and exercises daily doing water aerobics, walking and strength training.  After coming off her losartan, her BP has normalized to the 120s  over 70s.  She is completely asymptomatic. Her LDL is back up at 168 (from 90 on statin therapy), HDL is 54, triglycerides 121, total cholesterol 246, normal renal panel, normal TSH.      Established patient.   30 minutes spent by me on the date of the encounter doing chart review, history and exam, documentation and further activities per the note    The longitudinal plan of care for the condition(s) below were addressed during this visit. Due to the added complexity in care, I will continue to support Julia in the subsequent management of this condition(s) and with the ongoing continuity of care of this condition(s).  Hyperlipidemia.    This note was completed in part using dictation via the Dragon voice recognition software. Some word and grammatical errors may occur and must be interpreted in the appropriate clinical context. If there are any questions pertaining to this issue, please contact me for further clarification.     Orders Placed This Encounter   Procedures     Apolipoprotein B     Lipoprotein (a)     Comprehensive metabolic panel     Lipid Profile     CRP cardiac risk     Follow-Up with Cardiology           CURRENT MEDICATIONS:  Current Outpatient Medications   Medication Sig Dispense Refill     atorvastatin (LIPITOR) 20 MG tablet Take 1 tablet (20 mg) by mouth daily. 100 tablet 4     CALCIUM-VITAMIN D PO Take 2 tablets by mouth 2 times daily. 1500mg calcium  2000units Vitamin D3       cetirizine (ZYRTEC) 10 MG tablet Take 10 mg by mouth daily       triamcinolone (KENALOG) 0.1 % external cream Apply topically 2 times daily 45 g 0         ALLERGIES:  Allergies   Allergen Reactions     Boniva [Ibandronic Acid] Other (See Comments) and Hives     Tingling of lips       Fosamax [Alendronate] Swelling     Lips and throat swelling     Iodine Hives     Latex        Julia is a 63 year old who is being evaluated via a billable video visit.    How would you like to obtain your AVS? MyChart  If the video visit is  "dropped, the invitation should be resent by: Text to cell phone: [phone]  Will anyone else be joining your video visit? No    Video-Visit Details    Type of service:  Video Visit   Originating Location (pt. Location): Home  Distant Location (provider location):  On-site  Platform used for Video Visit: [employer]    Vitals - Patient Reported  Systolic (Patient Reported): 118  Diastolic (Patient Reported): 68  Weight (Patient Reported): 65.8 kg (145 lb)  Height (Patient Reported): 163.7 cm (5' 4.45\")  BMI (Based on Pt Reported Ht/Wt): 24.54      Patient will be using [facility]/mychart for her video visit today.     Reina Wood CMA        Thank you for allowing me to participate in the care of your patient.      Sincerely,     Sienna Alston MD     [facility]  cc:   Sienna Alston MD  [address] [place] [id number]      "

## 2024-08-26 ENCOUNTER — VIRTUAL VISIT (OUTPATIENT)
Dept: ENDOCRINOLOGY | Facility: CLINIC | Age: 64
End: 2024-08-26
Payer: MEDICARE

## 2024-08-26 DIAGNOSIS — Z92.29 HX DRUG THERAPY: ICD-10-CM

## 2024-08-26 DIAGNOSIS — Z78.0 ASYMPTOMATIC MENOPAUSAL STATE: ICD-10-CM

## 2024-08-26 DIAGNOSIS — M81.0 AGE-RELATED OSTEOPOROSIS WITHOUT CURRENT PATHOLOGICAL FRACTURE: Primary | ICD-10-CM

## 2024-08-26 DIAGNOSIS — M89.9 BONE DISEASE: ICD-10-CM

## 2024-08-26 DIAGNOSIS — Z92.29 HISTORY OF BISPHOSPHONATE THERAPY: ICD-10-CM

## 2024-08-26 DIAGNOSIS — Z92.29 PERSONAL HISTORY OF OTHER DRUG THERAPY: ICD-10-CM

## 2024-08-26 PROCEDURE — G2211 COMPLEX E/M VISIT ADD ON: HCPCS | Mod: 95 | Performed by: INTERNAL MEDICINE

## 2024-08-26 PROCEDURE — 99214 OFFICE O/P EST MOD 30 MIN: CPT | Mod: 95 | Performed by: INTERNAL MEDICINE

## 2024-08-26 NOTE — NURSING NOTE
Current patient location: [facility] [place] [address]    Is the patient currently in the state of [place]? YES    Visit mode:VIDEO    If the visit is dropped, the patient can be reconnected by: VIDEO VISIT: Text to cell phone:   Telephone Information:   Mobile [phone]       Will anyone else be joining the visit? NO  (If patient encounters technical issues they should call [phone]    How would you like to obtain your AVS? MyChart    Are changes needed to the allergy or medication list? No    Are refills needed on medications prescribed by this physician? NO    Rooming Documentation:  Not applicable      Reason for visit: RECHECK (Osteo )    Leslee DURANF

## 2024-08-26 NOTE — PATIENT INSTRUCTIONS
[facility]  Dr Rose, Endocrinology Department    [facility]   [address] [facility]  [place] [address]  Appointment [phone]  Fax: [phone]  [place]: Monday - Thursday      Continue PROLIA.  Next PROLIA 2024, 2025  DEXA   Follow up in 1 year.  (Recommend in person visit)    PROLIA Scheduling information:  It will be done in clinic.   You need to make nurse only appointment. (call [place]: [phone] or [place]:[phone] and schedule Nurse only appointment)  You will need labs few days prior to PROLIA (calcium labs)- please schedule lab appointment.  PROLIA is every 6 months injection- so please schedule accordingly.  It is recommended NOT to miss or delay PROLIA injections.    Possible major side effects of Denosumab (PROLIA) include risk for atypical femur fractures, hypersensitivity, low calcium levels, osteonecrosis of jaw (which can manifest as jaw pain, osteomyelitis, osteitis, bone erosion, tooth/periodontal infection, toothache, gingival ulceration/erosion). Other s/e include but not limited to Hypertension, Headache and peripheral edema.   Always let your dentist lnow about the medication if plan for major dental procedure.    Indication: Prolia  (denosumab) is a prescription medicine used to treat osteoporosis in patients who:   Are at high risk for fracture, meaning patients who have had a fracture related to osteoporosis, or who have multiple risk factors for fracture   Cannot use another osteoporosis medicine or other osteoporosis medicines did not work well   The timeline for early/late injections would be 4 weeks early and any time after the 6 month johnna. If a patient receives their injection late, then the subsequent injection would be 6 months from the date that they actually received the injection    The pt was advised to  Maintain an adequate calcium and vitamin D intake and to supplement vitamin D if needed to  maintain serum levels of 25 hydroxy D (25 OH D) between 30-60 ng/ml.  Limit alcohol intake to no more than 2 servings per day.  Limit caffeine intake.  Maintain an active lifestyle including weight-bearing exercises for at least 30 mins daily.  Take measures to reduce the risk of falling.     You should get 1000- 1200 mg/day calcium in divided doses of no more than 500 mg/dose.  This INCLUDES what is in your food as well as what is in any supplements you may be taking.    Vit D about 800-1000 IU/day ( unless you have vit D deficiency- in that case higher dose)  Dietary sources of calcium:: These also contain vitamin D  Milk                            8 oz            300 mg calcium  Yogurt                          1 cup           400 mg calcium   Hard cheese                     1.5 oz          300 mg  Cottage cheese                  2 cup           300 mg  Orange juice with Calcium       8 oz            300 mg  Low fat dairy sources are recommended        You should get 30 minutes of moderate weight bearing exercise on most days of the week .  Weight bearing exercise includes such things as walking, jogging, hiking, dancing.  You should also get Strength training 2 or more times/week in addition to other weight -being exercise. Strength training uses weight or resistance beyond that seen in everyday activities -(pilates, weight training with free weights, weight machines or resistance bands)     Living with Osteoporosis: Preventing Fractures  If you have osteoporosis, you can do a lot to reduce its effect on your life. Knowing how to prevent fractures and spinal curvature can help you live more comfortably and safely with this disease.  Reducing your risk for fractures  The most common fracture sites in people with osteoporosis are the wrist, spine, and hip. These fractures are often caused by accidents and falls. All fractures are painful and may limit what you can do. But hip fractures are very serious. They often  need surgery, and it can take months to recover. To reduce your risk for fractures:  Get regular exercise. Try walking, swimming, or weight training.  Eat foods that are rich in calcium, or take calcium supplements.  Make your home safe to help avoid accidents.  Take extra precautions not to fall in risky areas, such as icy sidewalks.  Understanding spinal fractures  Your spine is made up of many bones called vertebrae. Osteoporosis can cause the vertebrae in your spine to collapse. As a result, your upper back may arch forward, creating a curvature. Spine fractures may also result from back strain and bad posture. You will also lose height. Your lower spine must then adjust to keep your body balanced. This can cause back pain. To prevent or lessen these spinal changes:  Practice good posture.  Use proper techniques if you need to lift heavy objects.  Do back exercises to help your posture.  Lie on your back when you have pain.  Ask your healthcare provider about these and other ways to help your spine.  [employer] last reviewed this educational content on 5/1/2018 2000-2021 [employer]. All rights reserved. This information is not intended as a substitute for professional medical care. Always follow your healthcare professional's instructions.          Living with Osteoporosis: Regular Exercise  If you have osteoporosis, exercise is vital for your health. It can prevent bone fractures and spine changes. It will slow bone loss. Exercise will strengthen your body. It can also be fun. A variety of exercises is best. See below for exercises that can help you. But before you start, talk with your healthcare provider to be sure these exercises are right for you.   Resistance exercises. These build muscle strength and maintain bone mass. They also make you less prone to injury. Exercises include lifting small weights, doing push-ups and sit-ups, using elastic exercise bands, and using weight machines.    Weight-bearing activities. These help your whole body. They also help you maintain bone mass. Activities include walking, dancing, and housework.   Non-weight-bearing exercises. These help prevent back strain and pain. They do this by building the trunk and leg muscles. Exercises that help with flexibility can prevent falls. Examples include swimming, water exercise, and stretching.   Staying safe  Here are tips to stay safe:   Always check with your healthcare provider before starting any new exercise program.  Use weights only as instructed.  Stop any exercise that causes pain.  [employer] last reviewed this educational content on 5/1/2018 2000-2021 [employer]. All rights reserved. This information is not intended as a substitute for professional medical care. Always follow your healthcare professional's instructions.          Preventing Osteoporosis: Avoiding Bone Loss  Certain factors can speed up bone loss or decrease bone growth. For example, alcohol, cigarettes, and certain medicines reduce bone mass. Some foods make it hard for your body to absorb calcium.  Things to avoid  Here are things to avoid to help prevent osteoporosis:  Alcohol. This is toxic to bones. It is a major cause of bone loss. Heavy drinking can cause osteoporosis even if you have no other risk factors.  Smoking. This reduces bone mass. Smoking may also interfere with estrogen levels and cause early menopause.  Inactivity. Not being active makes your bones lose strength and become thinner. Over time, thin bones may break. Women who aren't active are at a high risk for osteoporosis.  Certain medicines. Some medicines, such as cortisone, increase bone loss. They also decrease bone growth. Ask your healthcare provider about any side effects of your medicines, and how to prevent them.  Protein-rich or salty foods. Eaten in large amounts, these foods may deplete calcium.  Caffeine. This increases calcium loss. People who drink  a lot of coffee, tea, or soda lose more calcium than those who don't.  [employer] last reviewed this educational content on 2018-2021 [employer]. All rights reserved. This information is not intended as a substitute for professional medical care. Always follow your healthcare professional's instructions.          Preventing Osteoporosis: Meeting Your Calcium Needs  Your body needs calcium to build and repair bones. But it can't make calcium on its own. That's why it's important to eat calcium-rich foods. Some foods are naturally rich in calcium. Others have calcium added (fortified). It's best to get calcium from the foods you eat. But if you can't get enough, you may want to take calcium supplements. To meet your daily calcium needs, try the foods listed below.                          Dairy Fish & beans Other sources   Source   Calcium (mg) per serving   Source   Calcium (mg) per serving   Source   Calcium (mg) per serving   Low-fat yogurt, plain   415 mg/8 oz.   Sardines, Atlantic, canned, with bones   351 mg/3 oz.   Oatmeal, instant, fortified   215 mg/1 cup   Nonfat milk   302 mg/1 cup   [place], sockeye, canned, with bones   239 mg/3 oz.   Tofu made with calcium sulfate   204 mg/3 oz.   Low-fat milk   297 mg/1 cup   Soybeans, fresh, boiled   131 mg/1/2 cup   Collards   179 mg/1/2 cup   Swiss cheese   272 mg/1 oz.   White beans, cooked   81 mg/1/2 cup   English muffin, whole wheat   175 mg/1 muffin   Cheddar cheese   205 mg/1 oz.   Navy beans, cooked   79 mg/1/2 cup   Kale   90 mg/1/2 cup   Ice cream strawberry   79 mg/1/2 cup           Orange, navel   56 mg/1 medium   Note: Calcium levels may vary depending on brand and size.  Daily calcium needs  14 to 18 years old: 1,300 mg  19 to 30 years old: 1,000 mg  31 to 50 years old: 1,000 mg  51 to 70 years old, women: 1,200 mg  51 to 70 years old, men: 1,000 mg  Pregnant or nursin to 18 years old: 1,300 mg, 19 to 50 years old: 1,000  mg  Older than 70 (women and men): 1,200 mg   [employer] last reviewed this educational content on 5/1/2018 2000-2021 [employer]. All rights reserved. This information is not intended as a substitute for professional medical care. Always follow your healthcare professional's instructions.

## 2024-08-26 NOTE — PROGRESS NOTES
"THIS IS A VIDEO VISIT:    Phone call visit/virtual visit encounter:    Name of patient: Julia Key    Date of encounter: 8/26/2024    Time of start of video visit: 9:33    Video started: 9:45    Video ended: 9:54    Provider location: working from home/ [facility]    Patient location: patients home.    Mode of transmission: Meican video/ PharmAssistant    Verbal consent: obtained before starting visit. Pt is agreeable.      The patient has been notified of following:      \"This VIDEO visit will be conducted via a call between you and your physician/provider. We have found that certain health care needs can be provided without the need for a physical exam.  This service lets us provide the care you need with a short phone conversation.  If a prescription is necessary we can send it directly to your pharmacy.  If lab work is needed we can place an order for that and you can then stop by our lab to have the test done at a later time.     With new updates with corona virus patient might be billed as clinic visit.     If during the course of the call the physician/provider feels a telephone visit is not appropriate, you will not be charged for this service.\"      Past medical history, social history, family history, allergy and medications were reviewed and updated as appropriate.  Reviewed pertinent labs, notes, imaging studies personally.     Name: Julia Key  Seen for f/u of osteoporosis.     HPI:  Julia Key is a 63 year old female who presents for the evaluation of osteoporosis.   has a past medical history of Arthritis (1994), Depression, prolonged, Hyperlipidemia, Idiopathic osteoporosis (03/08/2023), and Osteopenia.    She was diagnosed with osteopenia few years back and then osteoporosis with DEXA 7/2022.    DEXA 2015: Osteopenia.    DEXA 7/2022:Osteoporosis. There has been probable significant decrease in bone density of the lumbar spine and of the hip(s).     Started PROLIA 2023. Tolerating " well.    DEXA 7/2024: Low bone density (OSTEOPENIA).   There has been a 8.5% increase in lumbar spine BMD. There has been a 5.0% increase in bilateral hip BMD. This indicates significant change based on 95% confidence interval.    RISK FACTORS:  Post-menopausal, Parent history of osteoporosis, Parent history of a hip fracture, follow up osteopenia.  Started Fosamax 12/2022 but was not able to tolerate FOSAMAX 2/2 to GI s/e like severe abdominal pain.  Trial of BONIVA 1/2023-- but had face flushing, tingling lips and nausea.+ skin reaction.  Plan was for reclast but it was discontinued in the setting of above.    Started PROLIA 2023. Tolerating well.    PROLIA dates: (at [facility])  5/2024 11/2023 5/2023    GERD: yes- under control with diet only. Not on medication.    Dental health: normal. No major dental procedures planned.    Kidney function: within normal limits    Previous treatment for osteopenia/osteoporosis: previous Fosamax for one month in 2013 (she had dizziness on it and GI problems) and Boniva    Current treatment for Osteopenia/Osteoporosis: Calcium, Vitamin D    Smoke:No  Family History:Yes: mother with osteoporosis  Menstrual history/Birthing: s/p menopause. Took HRT for about 6 years. Last use was May 2020.  Fractures:No  Kidney stones: No  GI Surgery:No  Duration of therapy:  As noted above  Exercise:Yes: 4-5 times a week- 30-60 min each time.  Diet:  0-1 servings of dairy/day  Ca/Vitamin D: 1200 mg of calcium/day, 2000 international unit(s) of vit D/day  Alcohol:  No  Eating Disorder: No  Steroid Use:  No  PMH/PSH:  Past Medical History:   Diagnosis Date    Arthritis 1994    Depression, prolonged     Cyclical related to 's health    Hyperlipidemia     One high reading late Fall 2016    Idiopathic osteoporosis 03/08/2023    Osteopenia      Past Surgical History:   Procedure Laterality Date    COLONOSCOPY  08/2019    CV CORONARY ANGIOGRAM N/A 08/11/2022    Procedure: Coronary Angiogram;   Surgeon: Diana Smith MD;  Location:  [facility]    ORTHOPEDIC SURGERY  05/1997    Z LT SHOULDER G/E 2 VIEWS  01/01/1999 1997, 1999 - decompression, clavicle resection     Family Hx:  Family History   Problem Relation Age of Onset    Osteoporosis Mother     Dementia Mother     Hyperlipidemia Mother     Hypertension Father     Cerebrovascular Disease Father     Coronary Artery Disease Father 30    Diabetes Father     Hyperlipidemia Father     Thyroid Disease Father     Coronary Artery Disease Brother     No Known Problems Maternal Grandmother     No Known Problems Maternal Grandfather     Myocardial Infarction Paternal Grandmother     Obesity Paternal Grandmother        Social Hx:  Social History     Socioeconomic History    Marital status:      Spouse name: Not on file    Number of children: Not on file    Years of education: Not on file    Highest education level: Not on file   Occupational History    Occupation: Retired .   Tobacco Use    Smoking status: Never    Smokeless tobacco: Never   Vaping Use    Vaping status: Never Used   Substance and Sexual Activity    Alcohol use: Yes     Comment: 2-3 drinks/month, socially    Drug use: No    Sexual activity: Yes     Partners: Male     Birth control/protection: None   Other Topics Concern    Parent/sibling w/ CABG, MI or angioplasty before 65F 55M? No   Social History Narrative    Retired in June 2020, was working as a [employer].   since 1989. One daughter, one dog.    Daughter and her  [place].  is stationed in [place] (), dtr living with parents as house is finished. Dtr is an  and .         Shannon Arciniega, DNP, APRN, CNP    07/29/24     Social Determinants of Health     Financial Resource Strain: Low Risk  (7/24/2024)    Financial Resource Strain     Within the past 12 months,  have you or your family members you live with been unable to get utilities (heat, electricity) when it was really needed?: No   Food Insecurity: Low Risk  (7/24/2024)    Food Insecurity     Within the past 12 months, did you worry that your food would run out before you got money to buy more?: No     Within the past 12 months, did the food you bought just not last and you didn t have money to get more?: No   Transportation Needs: Low Risk  (7/24/2024)    Transportation Needs     Within the past 12 months, has lack of transportation kept you from medical appointments, getting your medicines, non-medical meetings or appointments, work, or from getting things that you need?: No   Physical Activity: Sufficiently Active (7/24/2024)    Exercise Vital Sign     Days of Exercise per Week: 5 days     Minutes of Exercise per Session: 40 min   Stress: No Stress Concern Present (7/24/2024)    [employer] - Occupational Stress Questionnaire     Feeling of Stress : Not at all   Social Connections: Unknown (7/24/2024)    Social Connection and Isolation Panel [NHANES]     Frequency of Communication with Friends and Family: Not on file     Frequency of Social Gatherings with Friends and Family: More than three times a week     Attends Zoroastrian Services: Not on file     Active Member of Clubs or Organizations: Not on file     Attends Club or Organization Meetings: Not on file     Marital Status: Not on file   Interpersonal Safety: Low Risk  (7/29/2024)    Interpersonal Safety     Do you feel physically and emotionally safe where you currently live?: Yes     Within the past 12 months, have you been hit, slapped, kicked or otherwise physically hurt by someone?: No     Within the past 12 months, have you been humiliated or emotionally abused in other ways by your partner or ex-partner?: No   Housing Stability: Low Risk  (7/24/2024)    Housing Stability     Do you have housing? : Yes     Are you worried about  losing your housing?: No          MEDICATIONS:  has a current medication list which includes the following prescription(s): atorvastatin, calcium-vitamin d, cetirizine, and triamcinolone, and the following Facility-Administered Medications: denosumab.    ROS     ROS: 10 point ROS neg other than the symptoms noted above in the HPI.    Physical Exam   VS: There were no vitals taken for this visit.  GENERAL: healthy, alert and no distress  EYES: Eyes grossly normal to inspection, conjunctivae and sclerae normal  ENT: no nose swelling, nasal discharge.  Thyroid: no apparent thyroid nodules  RESP: no audible wheeze, cough, or visible cyanosis.  No visible retractions or increased work of breathing.  Able to speak fully in complete sentences.  ABDO: not evaluated.  EXTREMITIES: no hand tremors.  NEURO: Cranial nerves grossly intact, mentation intact and speech normal  SKIN: No apparent skin lesions, rash or edema seen   PSYCH: mentation appears normal, affect normal/bright, judgement and insight intact, normal speech and appearance well-groomed      LABS:  Calcium:  ENDO CALCIUM LABS-[facility] Latest Ref Rng & Units 8/11/2022   CALCIUM 8.5 - 10.1 mg/dL 9.4     Creatinine:  Creatinine   Date Value Ref Range Status   08/23/2023 0.89 0.51 - 0.95 mg/dL Final   03/29/2021 0.76 0.52 - 1.04 mg/dL Final       TFTs:  Lab Results   Component Value Date    TSH 3.01 12/16/2016       PTH:  Vitamin D:  Vitamin D Deficiency Screening Results:  Lab Results   Component Value Date    VITDT 45 12/21/2022       DEXA 7/2022:  IMPRESSION  Osteoporosis., Degenerative changes of the lumbar spine which may falsely elevate results.  There has been probable significant decrease in bone density of the lumbar spine. There has been probable significant decrease in bone density of the hip(s).     DEXA 7/2024: Low bone density (OSTEOPENIA).   There has been a 8.5% increase in lumbar spine BMD. There has been a 5.0% increase in bilateral hip BMD. This  indicates significant change based on 95% confidence interval.  All pertinent notes, labs, and images personally reviewed by me.     A/P  Ms.Mary ERIKA Key is a 63 year old here for the evaluation of:    Age-related osteoporosis without current pathological fracture  Comment: Risk factors for low bone density include personal history of fracture or family history, , female, Estrogen deficiency.   DEXA 7/2022:Osteoporosis. There has been probable significant decrease in bone density of the lumbar spine and of the hip(s).   On Prolia since 5/2023 and tolerating ok.  DEXA 2024- osteopenia and improved BMD  Normal calcium, creatinine and vit D.  Not able to tolerate FOSAMAX and BONIVA as noted above.  Plan:   Discussed diagnosis, pathophysiology, management and treatment options of condition with pt.  DEXA 2024- osteopenia and improved BMD.  Plan to continue PROLIA.  Next PROLIA 11/2024, 5/2025.  DEXA 2026.  Follow up in 1 year.      Plan: denosumab (PROLIA) injection 60 mg, Creatinine,        Calcium, Vitamin D Deficiency    Discussed indications, risks and benefits of all medications prescribed, and answered questions to patient's satisfaction.    The pt was advised to  Maintain an adequate calcium and vitamin D intake and to supplement vitamin D if needed to maintain serum levels of 25 hydroxy D (25 OH D) between 30-60 ng/ml.  Limit alcohol intake to no more than 2 servings per day.  Limit caffeine intake.  Maintain an active lifestyle including weight-bearing exercises for at least 30 mins daily.  Take measures to reduce the risk of falling.    Possible major side effects of Denosumab (PROLIA) include risk for atypical femur fractures, hypersensitivity, low calcium levels, osteonecrosis of jaw (which can manifest as jaw pain, osteomyelitis, osteitis, bone erosion, tooth/periodontal infection, toothache, gingival ulceration/erosion). Other s/e include but not limited to Hypertension, Headache and peripheral  edema.   Always let your dentist lnow about the medication if plan for major dental procedure.    Indication: Prolia  (denosumab) is a prescription medicine used to treat osteoporosis in patients who:   Are at high risk for fracture, meaning patients who have had a fracture related to osteoporosis, or who have multiple risk factors for fracture   Cannot use another osteoporosis medicine or other osteoporosis medicines did not work well   The timeline for early/late injections would be 4 weeks early and any time after the 6 month johnna. If a patient receives their injection late, then the subsequent injection would be 6 months from the date that they actually received the injection    Discussed indications, risks and benefits of all medications prescribed, and answered questions to patient's satisfaction.  The longitudinal plan of care for the diagnosis(es)/condition(s) as documented were addressed during this visit. Due to the added complexity in care, I will continue to support Julia in the subsequent management and with ongoing continuity of care.  All questions were answered.  The patient indicates understanding of the above issues and agrees with the plan set forth.    Follow-up:  As noted in AVS    Yessica Rose MD  Endocrinology  [facility]  CC: Shaheed Arciniega following steps were completed to comply with the REMS program for Prolia:  Reviewed the serious risks of Prolia  and the symptoms of each risk.  Advised patient to seek prompt medical attention if they have signs or symptoms of any of the serious risks.  Patient will be provided a copy of the Medication Guide and Patient Brochure prior to first injection.    Yessica Rose MD

## 2024-08-26 NOTE — LETTER
"8/26/2024      Julia Key  [address] [place] [address]      Dear Colleague,    Thank you for referring your patient, Julia Key, to the [facility]. Please see a copy of my visit note below.    THIS IS A VIDEO VISIT:    Phone call visit/virtual visit encounter:    Name of patient: Julia Key    Date of encounter: 8/26/2024    Time of start of video visit: 9:33    Video started: 9:45    Video ended: 9:54    Provider location: working from home/ [facility]    Patient location: patients home.    Mode of transmission: [employer] video/ [employer]    Verbal consent: obtained before starting visit. Pt is agreeable.      The patient has been notified of following:      \"This VIDEO visit will be conducted via a call between you and your physician/provider. We have found that certain health care needs can be provided without the need for a physical exam.  This service lets us provide the care you need with a short phone conversation.  If a prescription is necessary we can send it directly to your pharmacy.  If lab work is needed we can place an order for that and you can then stop by our lab to have the test done at a later time.     With new updates with corona virus patient might be billed as clinic visit.     If during the course of the call the physician/provider feels a telephone visit is not appropriate, you will not be charged for this service.\"      Past medical history, social history, family history, allergy and medications were reviewed and updated as appropriate.  Reviewed pertinent labs, notes, imaging studies personally.     Name: Julia Key  Seen for f/u of osteoporosis.     HPI:  Julia Key is a 63 year old female who presents for the evaluation of osteoporosis.   has a past medical history of Arthritis (1994), Depression, prolonged, Hyperlipidemia, Idiopathic osteoporosis (03/08/2023), and Osteopenia.    She was diagnosed with osteopenia few years back " and then osteoporosis with DEXA 7/2022.    DEXA 2015: Osteopenia.    DEXA 7/2022:Osteoporosis. There has been probable significant decrease in bone density of the lumbar spine and of the hip(s).     Started PROLIA 2023. Tolerating well.    DEXA 7/2024: Low bone density (OSTEOPENIA).   There has been a 8.5% increase in lumbar spine BMD. There has been a 5.0% increase in bilateral hip BMD. This indicates significant change based on 95% confidence interval.    RISK FACTORS:  Post-menopausal, Parent history of osteoporosis, Parent history of a hip fracture, follow up osteopenia.  Started Fosamax 12/2022 but was not able to tolerate FOSAMAX 2/2 to GI s/e like severe abdominal pain.  Trial of BONIVA 1/2023-- but had face flushing, tingling lips and nausea.+ skin reaction.  Plan was for reclast but it was discontinued in the setting of above.    Started PROLIA 2023. Tolerating well.    PROLIA dates: (at [facility])  5/2024 11/2023 5/2023    GERD: yes- under control with diet only. Not on medication.    Dental health: normal. No major dental procedures planned.    Kidney function: within normal limits    Previous treatment for osteopenia/osteoporosis: previous Fosamax for one month in 2013 (she had dizziness on it and GI problems) and Boniva    Current treatment for Osteopenia/Osteoporosis: Calcium, Vitamin D    Smoke:No  Family History:Yes: mother with osteoporosis  Menstrual history/Birthing: s/p menopause. Took HRT for about 6 years. Last use was May 2020.  Fractures:No  Kidney stones: No  GI Surgery:No  Duration of therapy:  As noted above  Exercise:Yes: 4-5 times a week- 30-60 min each time.  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     Hyperlipidemia     One high  reading late Fall 2016     Idiopathic osteoporosis 03/08/2023     Osteopenia      Past Surgical History:   Procedure Laterality Date     COLONOSCOPY  08/2019     CV CORONARY ANGIOGRAM N/A 08/11/2022    Procedure: Coronary Angiogram;  Surgeon: Diana Smith MD;  Location:  [facility]     ORTHOPEDIC SURGERY  05/1997     ZZC LT SHOULDER G/E 2 VIEWS  01/01/1999 1997, 1999 - decompression, clavicle resection     Family Hx:  Family History   Problem Relation Age of Onset     Osteoporosis Mother      Dementia Mother      Hyperlipidemia Mother      Hypertension Father      Cerebrovascular Disease Father      Coronary Artery Disease Father 30     Diabetes Father      Hyperlipidemia Father      Thyroid Disease Father      Coronary Artery Disease Brother      No Known Problems Maternal Grandmother      No Known Problems Maternal Grandfather      Myocardial Infarction Paternal Grandmother      Obesity Paternal Grandmother        Social Hx:  Social History     Socioeconomic History     Marital status:      Spouse name: Not on file     Number of children: Not on file     Years of education: Not on file     Highest education level: Not on file   Occupational History     Occupation: Retired .   Tobacco Use     Smoking status: Never     Smokeless tobacco: Never   Vaping Use     Vaping status: Never Used   Substance and Sexual Activity     Alcohol use: Yes     Comment: 2-3 drinks/month, socially     Drug use: No     Sexual activity: Yes     Partners: Male     Birth control/protection: None   Other Topics Concern     Parent/sibling w/ CABG, MI or angioplasty before 65F 55M? 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   since 1989. One daughter, one dog.    Daughter and her  [place].  is stationed in [place] (), dtr living with parents as  house is finished. Dtr is an  and .         Shannon Arciniega, DNP, APRN, CNP    07/29/24     Social Determinants of Health     Financial Resource Strain: Low Risk  (7/24/2024)    Financial Resource Strain      Within the past 12 months, have you or your family members you live with been unable to get utilities (heat, electricity) when it was really needed?: No   Food Insecurity: Low Risk  (7/24/2024)    Food Insecurity      Within the past 12 months, did you worry that your food would run out before you got money to buy more?: No      Within the past 12 months, did the food you bought just not last and you didn t have money to get more?: No   Transportation Needs: Low Risk  (7/24/2024)    Transportation Needs      Within the past 12 months, has lack of transportation kept you from medical appointments, getting your medicines, non-medical meetings or appointments, work, or from getting things that you need?: No   Physical Activity: Sufficiently Active (7/24/2024)    Exercise Vital Sign      Days of Exercise per Week: 5 days      Minutes of Exercise per Session: 40 min   Stress: No Stress Concern Present (7/24/2024)    [place] [facility] [employer]ational [employer] - Occupational Stress Questionnaire      Feeling of Stress : Not at all   Social Connections: Unknown (7/24/2024)    Social Connection and Isolation Panel [NHANES]      Frequency of Communication with Friends and Family: Not on file      Frequency of Social Gatherings with Friends and Family: More than three times a week      Attends Judaism Services: Not on file      Active Member of Clubs or Organizations: Not on file      Attends Club or Organization Meetings: Not on file      Marital Status: Not on file   Interpersonal Safety: Low Risk  (7/29/2024)    Interpersonal Safety      Do you feel physically and emotionally safe where you currently live?: Yes      Within the past 12 months, have you been hit, slapped, kicked or otherwise  physically hurt by someone?: No      Within the past 12 months, have you been humiliated or emotionally abused in other ways by your partner or ex-partner?: No   Housing Stability: Low Risk  (7/24/2024)    Housing Stability      Do you have housing? : Yes      Are you worried about losing your housing?: No          MEDICATIONS:  has a current medication list which includes the following prescription(s): atorvastatin, calcium-vitamin d, cetirizine, and triamcinolone, and the following Facility-Administered Medications: denosumab.    ROS     ROS: 10 point ROS neg other than the symptoms noted above in the HPI.    Physical Exam   VS: There were no vitals taken for this visit.  GENERAL: healthy, alert and no distress  EYES: Eyes grossly normal to inspection, conjunctivae and sclerae normal  ENT: no nose swelling, nasal discharge.  Thyroid: no apparent thyroid nodules  RESP: no audible wheeze, cough, or visible cyanosis.  No visible retractions or increased work of breathing.  Able to speak fully in complete sentences.  ABDO: not evaluated.  EXTREMITIES: no hand tremors.  NEURO: Cranial nerves grossly intact, mentation intact and speech normal  SKIN: No apparent skin lesions, rash or edema seen   PSYCH: mentation appears normal, affect normal/bright, judgement and insight intact, normal speech and appearance well-groomed      LABS:  Calcium:  ENDO CALCIUM LABS-[facility] Latest Ref Rng & Units 8/11/2022   CALCIUM 8.5 - 10.1 mg/dL 9.4     Creatinine:  Creatinine   Date Value Ref Range Status   08/23/2023 0.89 0.51 - 0.95 mg/dL Final   03/29/2021 0.76 0.52 - 1.04 mg/dL Final       TFTs:  Lab Results   Component Value Date    TSH 3.01 12/16/2016       PTH:  Vitamin D:  Vitamin D Deficiency Screening Results:  Lab Results   Component Value Date    VITDT 45 12/21/2022       DEXA 7/2022:  IMPRESSION  Osteoporosis., Degenerative changes of the lumbar spine which may falsely elevate results.  There has been probable significant  decrease in bone density of the lumbar spine. There has been probable significant decrease in bone density of the hip(s).     DEXA 7/2024: Low bone density (OSTEOPENIA).   There has been a 8.5% increase in lumbar spine BMD. There has been a 5.0% increase in bilateral hip BMD. This indicates significant change based on 95% confidence interval.  All pertinent notes, labs, and images personally reviewed by me.     A/P  Ms.Mary ERIKA Key is a 63 year old here for the evaluation of:    Age-related osteoporosis without current pathological fracture  Comment: Risk factors for low bone density include personal history of fracture or family history, , female, Estrogen deficiency.   DEXA 7/2022:Osteoporosis. There has been probable significant decrease in bone density of the lumbar spine and of the hip(s).   On Prolia since 5/2023 and tolerating ok.  DEXA 2024- osteopenia and improved BMD  Normal calcium, creatinine and vit D.  Not able to tolerate FOSAMAX and BONIVA as noted above.  Plan:   Discussed diagnosis, pathophysiology, management and treatment options of condition with pt.  DEXA 2024- osteopenia and improved BMD.  Plan to continue PROLIA.  Next PROLIA 11/2024, 5/2025.  DEXA 2026.  Follow up in 1 year.      Plan: denosumab (PROLIA) injection 60 mg, Creatinine,        Calcium, Vitamin D Deficiency    Discussed indications, risks and benefits of all medications prescribed, and answered questions to patient's satisfaction.    The pt was advised to  Maintain an adequate calcium and vitamin D intake and to supplement vitamin D if needed to maintain serum levels of 25 hydroxy D (25 OH D) between 30-60 ng/ml.  Limit alcohol intake to no more than 2 servings per day.  Limit caffeine intake.  Maintain an active lifestyle including weight-bearing exercises for at least 30 mins daily.  Take measures to reduce the risk of falling.    Possible major side effects of Denosumab (PROLIA) include risk for atypical femur  fractures, hypersensitivity, low calcium levels, osteonecrosis of jaw (which can manifest as jaw pain, osteomyelitis, osteitis, bone erosion, tooth/periodontal infection, toothache, gingival ulceration/erosion). Other s/e include but not limited to Hypertension, Headache and peripheral edema.   Always let your dentist lnow about the medication if plan for major dental procedure.    Indication: Prolia  (denosumab) is a prescription medicine used to treat osteoporosis in patients who:   Are at high risk for fracture, meaning patients who have had a fracture related to osteoporosis, or who have multiple risk factors for fracture   Cannot use another osteoporosis medicine or other osteoporosis medicines did not work well   The timeline for early/late injections would be 4 weeks early and any time after the 6 month johnna. If a patient receives their injection late, then the subsequent injection would be 6 months from the date that they actually received the injection    Discussed indications, risks and benefits of all medications prescribed, and answered questions to patient's satisfaction.  The longitudinal plan of care for the diagnosis(es)/condition(s) as documented were addressed during this visit. Due to the added complexity in care, I will continue to support Julia in the subsequent management and with ongoing continuity of care.  All questions were answered.  The patient indicates understanding of the above issues and agrees with the plan set forth.    Follow-up:  As noted in AVS    Yessica Rose MD  Endocrinology  [facility]  CC: Shaheed Arciniega following steps were completed to comply with the REMS program for Prolia:  Reviewed the serious risks of Prolia  and the symptoms of each risk.  Advised patient to seek prompt medical attention if they have signs or symptoms of any of the serious risks.  Patient will be provided a copy of the Medication Guide and Patient Brochure prior to first  injection.    Yessica Rose MD      Again, thank you for allowing me to participate in the care of your patient.        Sincerely,        Yessica Rose MD

## 2024-09-17 ENCOUNTER — TELEPHONE (OUTPATIENT)
Dept: RESEARCH | Facility: CLINIC | Age: 64
End: 2024-09-17
Payer: MEDICARE

## 2024-09-17 NOTE — TELEPHONE ENCOUNTER
September 17 ,2024 12:22 NP called patient to explained that due to further clarification of this study, regarding previous diagnosis of diverticulosis, she is ineligible for this study. Patient understand and has been removed from the schedule.

## 2024-10-01 ENCOUNTER — LAB (OUTPATIENT)
Dept: LAB | Facility: CLINIC | Age: 64
End: 2024-10-01
Payer: MEDICARE

## 2024-10-01 DIAGNOSIS — Z92.29 PERSONAL HISTORY OF OTHER DRUG THERAPY: ICD-10-CM

## 2024-10-01 DIAGNOSIS — M81.0 AGE-RELATED OSTEOPOROSIS WITHOUT CURRENT PATHOLOGICAL FRACTURE: ICD-10-CM

## 2024-10-01 DIAGNOSIS — Z92.29 HISTORY OF BISPHOSPHONATE THERAPY: ICD-10-CM

## 2024-10-01 DIAGNOSIS — Z78.0 ASYMPTOMATIC MENOPAUSAL STATE: ICD-10-CM

## 2024-10-01 DIAGNOSIS — M89.9 BONE DISEASE: ICD-10-CM

## 2024-10-01 DIAGNOSIS — Z92.29 HX DRUG THERAPY: ICD-10-CM

## 2024-10-01 LAB
CALCIUM SERPL-MCNC: 9.6 MG/DL (ref 8.8–10.4)
CREAT SERPL-MCNC: 0.85 MG/DL (ref 0.51–0.95)
EGFRCR SERPLBLD CKD-EPI 2021: 75 ML/MIN/1.73M2
VIT D+METAB SERPL-MCNC: 48 NG/ML (ref 20–50)

## 2024-10-01 PROCEDURE — 82565 ASSAY OF CREATININE: CPT

## 2024-10-01 PROCEDURE — 82306 VITAMIN D 25 HYDROXY: CPT

## 2024-10-01 PROCEDURE — 82310 ASSAY OF CALCIUM: CPT

## 2024-10-01 PROCEDURE — 36415 COLL VENOUS BLD VENIPUNCTURE: CPT

## 2024-11-11 ENCOUNTER — ALLIED HEALTH/NURSE VISIT (OUTPATIENT)
Dept: PEDIATRICS | Facility: CLINIC | Age: 64
End: 2024-11-11
Payer: MEDICARE

## 2024-11-11 DIAGNOSIS — M81.0 AGE RELATED OSTEOPOROSIS, UNSPECIFIED PATHOLOGICAL FRACTURE PRESENCE: Primary | ICD-10-CM

## 2024-11-11 PROCEDURE — 99207 PR NO CHARGE NURSE ONLY: CPT

## 2024-11-11 PROCEDURE — 96372 THER/PROPH/DIAG INJ SC/IM: CPT | Performed by: INTERNAL MEDICINE

## 2024-11-11 NOTE — PROGRESS NOTES
Clinic Administered Medication Documentation      Prolia Documentation    Indication: Prolia  (denosumab) is a prescription medicine used to treat osteoporosis in patients who:   Are at high risk for fracture, meaning patients who have had a fracture related to osteoporosis, or who have multiple risk factors for fracture.  Cannot use another osteoporosis medicine or other osteoporosis medicines did not work well.  The timeline for early/late injections would be 4 weeks early and any time after the 6 month johnna. If a patient receives their injection late, then the subsequent injection would be 6 months from the date that they actually received the injection.    When was the last injection?  24  Was the last injection at least 6 months ago? Yes  Has the prior authorization been completed?  Yes  Is there an active order (written within the past 365 days, with administrations remaining, not ) in the chart?  Yes   GFR Estimate   Date Value Ref Range Status   10/01/2024 75 >60 mL/min/1.73m2 Final     Comment:     eGFR calculated using  CKD-EPI equation.   2021 84 >60 mL/min/[1.73_m2] Final     Comment:     Non  GFR Calc  Starting 2018, serum creatinine based estimated GFR (eGFR) will be   calculated using the Chronic Kidney Disease Epidemiology Collaboration   (CKD-EPI) equation.       Has patient had a GFR within the last 12 months? Yes   Is GFR under 30, or patient has a diagnosis of CKD4 or CKD5? No   Patient denies gastric bypass or parathyroid surgery in past 6 months? Yes - patient denies.   Patient denies dental work in the past two months involving drilling into the bone, such as implants/extractions, oral surgery or a tooth extraction that has not healed yet?  Yes  Patient denies plans for an emergency tooth extraction within the next week? Yes    The following steps were completed to comply with the REMS program for Prolia:  Reviewed information in the Medication Guide,  including the serious risks of Prolia  and the symptoms of each risk.  Advised patient to seek prompt medical attention if they have signs or symptoms of any of the serious risks.  Provided each patient a copy of the Medication Guide and Patient Guide.    Prior to injection, verified patient identity using patient's name and date of birth. Medication was administered. Please see MAR and medication order for additional information. Patient instructed to remain in clinic for 15 minutes and report any adverse reaction to staff immediately.    Vial/Syringe: Syringe  Was this medication supplied by the patient? No  Verified that the patient has refills remaining in their prescription.    Injected into Sub Q L arm    Yoly Kay RN

## 2025-01-20 ENCOUNTER — LAB (OUTPATIENT)
Dept: LAB | Facility: CLINIC | Age: 65
End: 2025-01-20
Payer: MEDICARE

## 2025-01-20 DIAGNOSIS — M81.0 AGE RELATED OSTEOPOROSIS, UNSPECIFIED PATHOLOGICAL FRACTURE PRESENCE: ICD-10-CM

## 2025-01-20 DIAGNOSIS — M81.8 IDIOPATHIC OSTEOPOROSIS: ICD-10-CM

## 2025-01-20 DIAGNOSIS — E78.00 PURE HYPERCHOLESTEROLEMIA: ICD-10-CM

## 2025-01-20 DIAGNOSIS — Z92.29 HX DRUG THERAPY: ICD-10-CM

## 2025-01-20 LAB
ALBUMIN SERPL BCG-MCNC: 4.2 G/DL (ref 3.5–5.2)
ALP SERPL-CCNC: 42 U/L (ref 40–150)
ALT SERPL W P-5'-P-CCNC: 23 U/L (ref 0–50)
ANION GAP SERPL CALCULATED.3IONS-SCNC: 8 MMOL/L (ref 7–15)
APO A-I SERPL-MCNC: 19 MG/DL
AST SERPL W P-5'-P-CCNC: 31 U/L (ref 0–45)
BILIRUB SERPL-MCNC: 0.5 MG/DL
BUN SERPL-MCNC: 17.7 MG/DL (ref 8–23)
CALCIUM SERPL-MCNC: 9.2 MG/DL (ref 8.8–10.4)
CHLORIDE SERPL-SCNC: 106 MMOL/L (ref 98–107)
CHOLEST SERPL-MCNC: 160 MG/DL
CREAT SERPL-MCNC: 0.82 MG/DL (ref 0.51–0.95)
CRP SERPL HS-MCNC: 0.55 MG/L
EGFRCR SERPLBLD CKD-EPI 2021: 78 ML/MIN/1.73M2
FASTING STATUS PATIENT QL REPORTED: YES
FASTING STATUS PATIENT QL REPORTED: YES
GLUCOSE SERPL-MCNC: 86 MG/DL (ref 70–99)
HCO3 SERPL-SCNC: 27 MMOL/L (ref 22–29)
HDLC SERPL-MCNC: 63 MG/DL
LDLC SERPL CALC-MCNC: 86 MG/DL
NONHDLC SERPL-MCNC: 97 MG/DL
POTASSIUM SERPL-SCNC: 4 MMOL/L (ref 3.4–5.3)
PROT SERPL-MCNC: 7 G/DL (ref 6.4–8.3)
SODIUM SERPL-SCNC: 141 MMOL/L (ref 135–145)
TRIGL SERPL-MCNC: 57 MG/DL

## 2025-01-20 PROCEDURE — 80061 LIPID PANEL: CPT

## 2025-01-20 PROCEDURE — 82172 ASSAY OF APOLIPOPROTEIN: CPT | Mod: 90

## 2025-01-20 PROCEDURE — 83695 ASSAY OF LIPOPROTEIN(A): CPT

## 2025-01-20 PROCEDURE — 80053 COMPREHEN METABOLIC PANEL: CPT

## 2025-01-20 PROCEDURE — 86141 C-REACTIVE PROTEIN HS: CPT

## 2025-01-20 PROCEDURE — 36415 COLL VENOUS BLD VENIPUNCTURE: CPT

## 2025-01-20 PROCEDURE — 99000 SPECIMEN HANDLING OFFICE-LAB: CPT

## 2025-01-21 LAB — APO B100 SERPL-MCNC: 75 MG/DL

## 2025-01-27 ENCOUNTER — TELEPHONE (OUTPATIENT)
Dept: CARDIOLOGY | Facility: CLINIC | Age: 65
End: 2025-01-27
Payer: MEDICARE

## 2025-01-27 DIAGNOSIS — Z82.49 FAMILY HISTORY OF PREMATURE CORONARY ARTERY DISEASE: ICD-10-CM

## 2025-01-27 DIAGNOSIS — E78.00 PURE HYPERCHOLESTEROLEMIA: Primary | ICD-10-CM

## 2025-01-27 RX ORDER — EVOLOCUMAB 140 MG/ML
140 INJECTION, SOLUTION SUBCUTANEOUS
Qty: 12 ML | Refills: 3 | Status: SHIPPED | OUTPATIENT
Start: 2025-01-27

## 2025-01-27 NOTE — TELEPHONE ENCOUNTER
----- Message from Sienna Alston sent at 1/24/2025  5:42 PM CST -----  Regarding: PCSK9 inhibitor prior authorization  Hello    Please see my note from today.      Thank you.  Dr. Alston    =======================================    Per Dr. Rogel note on 1/24/25: Discontinue atorvastatin. Initiate PCSK9 inhibitor (pending insurance approval).      Message routed to Drug coverage dept for cost estimate on PCSK9 inhibitors.

## 2025-01-27 NOTE — TELEPHONE ENCOUNTER
Patient called to inform of price estimate for repatha. Patient would like to begin medication and order entered. Encounter routed to Prior Authorization department. Information on medication reviewed over the phone and sent via [employer].

## 2025-01-27 NOTE — TELEPHONE ENCOUNTER
Central Prior Authorization Team   Phone: [phone]    PA Initiation    Medication: Repatha 140mg/ml  Insurance Company: [employer] - Phone [phone] Fax [phone]  Pharm[facility]illing the Rx: [id number] [place], [place] - [address] AT [address]  Filling Pharmacy Phone: [phone]  Filling Pharmacy Fax:    Start Date: 1/27/2025

## 2025-01-27 NOTE — TELEPHONE ENCOUNTER
Prior Authorization Approval    Authorization Effective Date: 1/1/2025  Authorization Expiration Date: 1/27/2026  Medication: Repatha 140mg/ml  Approved Dose/Quantity:   Reference #:     Insurance Company: [employer] - Phone [phone] Fax [phone]  Expected CoPay:       CoPay Card Available:      Foundation Assistance Needed:    Which Pharmacy is filling the prescription (Not needed for infusion/clinic administered): [id number] [place] [address] AT [facility] [address]  Pharmacy Notified:  yes  Patient Notified:  yes- Pharmacy will contact patient when ready to /ship

## 2025-01-27 NOTE — TELEPHONE ENCOUNTER
Patient has Medicare D through [employer] sponsored by an employer.    Praluent is not covered.    Repatha (after prior auth):  I estimate copay will be $25/mo or $50 for 3 mo.    Nury Muñoz  Pharmacy Technician/Liaison, Discharge Pharmacy   [phone] (voice or text)  [email or web address]  Pharmacy test claims are estimates and may not reflect final costs.  Suggested alternatives aim to be cost-effective and may not be therapeutically equivalent as this consult is informational and does not constitute medical advice.  Clinical decisions should be made by qualified healthcare providers.

## 2025-05-01 ENCOUNTER — TELEPHONE (OUTPATIENT)
Dept: ENDOCRINOLOGY | Facility: CLINIC | Age: 65
End: 2025-05-01
Payer: MEDICARE

## 2025-05-01 DIAGNOSIS — M81.0 AGE-RELATED OSTEOPOROSIS WITHOUT CURRENT PATHOLOGICAL FRACTURE: Primary | ICD-10-CM

## 2025-05-01 NOTE — TELEPHONE ENCOUNTER
Patient's prolia CAM order is . Patient's upcoming injection appointment is on 25.           Please review and sign new order. After signing, please route to CAM FINANCE so they can work on new referral. Thanks!    Yoly Kay RN

## 2025-05-12 ENCOUNTER — ALLIED HEALTH/NURSE VISIT (OUTPATIENT)
Dept: PEDIATRICS | Facility: CLINIC | Age: 65
End: 2025-05-12
Payer: MEDICARE

## 2025-05-12 DIAGNOSIS — M81.0 AGE RELATED OSTEOPOROSIS, UNSPECIFIED PATHOLOGICAL FRACTURE PRESENCE: Primary | ICD-10-CM

## 2025-05-12 PROCEDURE — 99207 PR NO CHARGE NURSE ONLY: CPT

## 2025-05-12 PROCEDURE — 96372 THER/PROPH/DIAG INJ SC/IM: CPT | Performed by: INTERNAL MEDICINE

## 2025-05-12 NOTE — PROGRESS NOTES
Clinic Administered Medication Documentation      Prolia Documentation    Indication: Prolia  (denosumab) is a prescription medicine used to treat osteoporosis in patients who:   Are at high risk for fracture, meaning patients who have had a fracture related to osteoporosis, or who have multiple risk factors for fracture.  Cannot use another osteoporosis medicine or other osteoporosis medicines did not work well.  The timeline for early/late injections would be 4 weeks early and any time after the 6 month johnna. If a patient receives their injection late, then the subsequent injection would be 6 months from the date that they actually received the injection.      When was the last injection?  25  Was the last injection at least 6 months ago? Yes  Has the prior authorization been completed?  Yes  Is there an active order (written within the past 365 days, with administrations remaining, not ) in the chart?  Yes   GFR Estimate   Date Value Ref Range Status   2025 78 >60 mL/min/1.73m2 Final     Comment:     eGFR calculated using  CKD-EPI equation.   2021 84 >60 mL/min/[1.73_m2] Final     Comment:     Non  GFR Calc  Starting 2018, serum creatinine based estimated GFR (eGFR) will be   calculated using the Chronic Kidney Disease Epidemiology Collaboration   (CKD-EPI) equation.       Has patient had a GFR within the last 12 months? Yes   Is GFR under 30, or patient has a diagnosis of CKD4 or CKD5? No   Patient denies gastric bypass or parathyroid surgery in past 6 months? Yes - patient denies.   Patient denies undergoing any dental procedures involving drilling into the bone, such as implants, extractions, or oral surgery, within the past two months that have not yet healed?  Yes - patient denies  Patient denies plans for an emergency tooth extraction within the next week? Yes    The following steps were completed to comply with the REMS program for Prolia:  Reviewed  information in the Medication Guide, including the serious risks of Prolia  and the symptoms of each risk.  Advised patient to seek prompt medical attention if they have signs or symptoms of any of the serious risks.  Provided each patient a copy of the Medication Guide and Patient Guide.    Prior to injection, verified patient identity using patient's name and date of birth. Medication was administered. Please see MAR and medication order for additional information. Patient instructed to remain in clinic for 15 minutes, report any adverse reaction to staff immediately, and remain in clinic for 15 minutes and report any adverse reaction to staff immediately but patient declined.    Vial/Syringe: Syringe  Was this medication supplied by the patient? No  Verified that the patient has administrations remaining in their prescription.    Administrations This Visit       denosumab (PROLIA) injection 60 mg       Admin Date  05/12/2025 Action  $Given Dose  60 mg Route  Subcutaneous Site   Documented By  Divine Velasquez RN    NDC: 71278-257-61    Lot#: [id number]    : AMGEN    Patient Supplied?: No    Comments: to left arm                    Patient tolerated infection to left posterior arm, subcutaneous route. Instructed patient to call [phone] for new or worsening symptoms or further questions. Patient verbalized understanding and agrees with the plan.     Divine Velasquez RN

## 2025-07-31 ENCOUNTER — LAB (OUTPATIENT)
Dept: LAB | Facility: CLINIC | Age: 65
End: 2025-07-31
Payer: MEDICARE

## 2025-07-31 DIAGNOSIS — E78.00 PURE HYPERCHOLESTEROLEMIA: ICD-10-CM

## 2025-07-31 DIAGNOSIS — Z82.49 FAMILY HISTORY OF PREMATURE CORONARY ARTERY DISEASE: ICD-10-CM

## 2025-07-31 DIAGNOSIS — Z78.9 STATIN INTOLERANCE: ICD-10-CM

## 2025-07-31 LAB
ALBUMIN SERPL BCG-MCNC: 4.3 G/DL (ref 3.5–5.2)
ALP SERPL-CCNC: 45 U/L (ref 40–150)
ALT SERPL W P-5'-P-CCNC: 18 U/L (ref 0–50)
ANION GAP SERPL CALCULATED.3IONS-SCNC: 8 MMOL/L (ref 7–15)
AST SERPL W P-5'-P-CCNC: 28 U/L (ref 0–45)
BILIRUB SERPL-MCNC: 0.6 MG/DL
BUN SERPL-MCNC: 16.3 MG/DL (ref 8–23)
CALCIUM SERPL-MCNC: 9.4 MG/DL (ref 8.8–10.4)
CHLORIDE SERPL-SCNC: 105 MMOL/L (ref 98–107)
CHOLEST SERPL-MCNC: 155 MG/DL
CREAT SERPL-MCNC: 0.9 MG/DL (ref 0.51–0.95)
EGFRCR SERPLBLD CKD-EPI 2021: 70 ML/MIN/1.73M2
FASTING STATUS PATIENT QL REPORTED: YES
FASTING STATUS PATIENT QL REPORTED: YES
GLUCOSE SERPL-MCNC: 88 MG/DL (ref 70–99)
HCO3 SERPL-SCNC: 25 MMOL/L (ref 22–29)
HDLC SERPL-MCNC: 62 MG/DL
LDLC SERPL CALC-MCNC: 71 MG/DL
NONHDLC SERPL-MCNC: 93 MG/DL
POTASSIUM SERPL-SCNC: 4 MMOL/L (ref 3.4–5.3)
PROT SERPL-MCNC: 7.1 G/DL (ref 6.4–8.3)
SODIUM SERPL-SCNC: 138 MMOL/L (ref 135–145)
TRIGL SERPL-MCNC: 112 MG/DL

## 2025-08-01 PROBLEM — M54.50 CHRONIC LOW BACK PAIN: Status: ACTIVE | Noted: 2025-08-01

## 2025-08-01 PROBLEM — G89.29 CHRONIC LOW BACK PAIN: Status: ACTIVE | Noted: 2025-08-01

## 2025-08-07 ENCOUNTER — OFFICE VISIT (OUTPATIENT)
Dept: CARDIOLOGY | Facility: CLINIC | Age: 65
End: 2025-08-07
Attending: INTERNAL MEDICINE
Payer: MEDICARE

## 2025-08-07 VITALS
HEART RATE: 87 BPM | HEIGHT: 65 IN | SYSTOLIC BLOOD PRESSURE: 130 MMHG | BODY MASS INDEX: 25.12 KG/M2 | WEIGHT: 150.8 LBS | OXYGEN SATURATION: 98 % | DIASTOLIC BLOOD PRESSURE: 82 MMHG

## 2025-08-07 DIAGNOSIS — E78.00 PURE HYPERCHOLESTEROLEMIA: Primary | ICD-10-CM

## 2025-08-07 DIAGNOSIS — Z78.9 STATIN INTOLERANCE: ICD-10-CM

## 2025-08-07 DIAGNOSIS — Z82.49 FAMILY HISTORY OF PREMATURE CORONARY ARTERY DISEASE: ICD-10-CM

## 2025-08-07 RX ORDER — EVOLOCUMAB 140 MG/ML
140 INJECTION, SOLUTION SUBCUTANEOUS
Qty: 6 ML | Refills: 11 | Status: SHIPPED | OUTPATIENT
Start: 2025-08-07

## 2025-08-18 ENCOUNTER — ANCILLARY PROCEDURE (OUTPATIENT)
Dept: MAMMOGRAPHY | Facility: CLINIC | Age: 65
End: 2025-08-18
Attending: NURSE PRACTITIONER
Payer: MEDICARE

## 2025-08-18 DIAGNOSIS — Z12.31 VISIT FOR SCREENING MAMMOGRAM: ICD-10-CM

## 2025-08-18 PROCEDURE — 77067 SCR MAMMO BI INCL CAD: CPT | Mod: TC | Performed by: RADIOLOGY

## 2025-08-18 PROCEDURE — 77063 BREAST TOMOSYNTHESIS BI: CPT | Mod: TC | Performed by: RADIOLOGY

## (undated) DEVICE — KIT HAND CONTROL ANGIOTOUCH ACIST 65CM AT-P65

## (undated) DEVICE — TOTE ANGIO CORP PC15AT SAN32CC83O

## (undated) DEVICE — RAD G/W INQWIRE .035X260CM J-TIP EXCHANGE IQ35F260J1O5RS

## (undated) DEVICE — DEFIB PRO-PADZ LVP LQD GEL ADULT 8900-2105-01

## (undated) DEVICE — INTRO GLIDESHEATH SLENDER 6FR 10X45CM 60-1060

## (undated) DEVICE — MANIFOLD KIT ANGIO AUTOMATED 014613

## (undated) DEVICE — SLEEVE TR BAND RADIAL COMPRESSION DEVICE 24CM TRB24-REG

## (undated) DEVICE — SYR ANGIOGRAPHY MULTIUSE KIT ACIST 014612

## (undated) DEVICE — 5FR X 100CM INFINITI TL DIAGNOSTIC CATHETER, JUDKINS LEFT CORONARY, JL 3.5, FEMORAL SELECTIVE THRULUMEN, SMALL, 0.038IN MAX GUIDEWIRE (EA/1)

## (undated) DEVICE — CATH ANGIO INFINITI JR4 4FRX100CM 538421

## (undated) RX ORDER — NITROGLYCERIN 5 MG/ML
VIAL (ML) INTRAVENOUS
Status: DISPENSED
Start: 2022-08-11

## (undated) RX ORDER — LIDOCAINE HYDROCHLORIDE 10 MG/ML
INJECTION, SOLUTION EPIDURAL; INFILTRATION; INTRACAUDAL; PERINEURAL
Status: DISPENSED
Start: 2022-08-11

## (undated) RX ORDER — FENTANYL CITRATE 50 UG/ML
INJECTION, SOLUTION INTRAMUSCULAR; INTRAVENOUS
Status: DISPENSED
Start: 2022-08-11

## (undated) RX ORDER — VERAPAMIL HYDROCHLORIDE 2.5 MG/ML
INJECTION, SOLUTION INTRAVENOUS
Status: DISPENSED
Start: 2022-08-11

## (undated) RX ORDER — HEPARIN SODIUM 1000 [USP'U]/ML
INJECTION, SOLUTION INTRAVENOUS; SUBCUTANEOUS
Status: DISPENSED
Start: 2022-08-11

## (undated) RX ORDER — HEPARIN SODIUM 200 [USP'U]/100ML
INJECTION, SOLUTION INTRAVENOUS
Status: DISPENSED
Start: 2022-08-11